# Patient Record
Sex: FEMALE | Race: BLACK OR AFRICAN AMERICAN | Employment: OTHER | URBAN - METROPOLITAN AREA
[De-identification: names, ages, dates, MRNs, and addresses within clinical notes are randomized per-mention and may not be internally consistent; named-entity substitution may affect disease eponyms.]

---

## 2017-01-31 ENCOUNTER — OFFICE VISIT (OUTPATIENT)
Dept: INTERNAL MEDICINE CLINIC | Age: 62
End: 2017-01-31

## 2017-01-31 VITALS
RESPIRATION RATE: 18 BRPM | WEIGHT: 227 LBS | HEIGHT: 60 IN | HEART RATE: 77 BPM | TEMPERATURE: 97.9 F | DIASTOLIC BLOOD PRESSURE: 90 MMHG | SYSTOLIC BLOOD PRESSURE: 140 MMHG | OXYGEN SATURATION: 92 % | BODY MASS INDEX: 44.57 KG/M2

## 2017-01-31 DIAGNOSIS — E66.01 MORBID OBESITY, UNSPECIFIED OBESITY TYPE (HCC): ICD-10-CM

## 2017-01-31 DIAGNOSIS — R73.01 IFG (IMPAIRED FASTING GLUCOSE): ICD-10-CM

## 2017-01-31 DIAGNOSIS — I10 ESSENTIAL HYPERTENSION: ICD-10-CM

## 2017-01-31 DIAGNOSIS — Z11.3 ROUTINE SCREENING FOR STI (SEXUALLY TRANSMITTED INFECTION): ICD-10-CM

## 2017-01-31 DIAGNOSIS — Z00.00 PHYSICAL EXAM, ANNUAL: Primary | ICD-10-CM

## 2017-01-31 DIAGNOSIS — R60.0 EDEMA OF BOTH LEGS: ICD-10-CM

## 2017-01-31 DIAGNOSIS — E78.00 PURE HYPERCHOLESTEROLEMIA: ICD-10-CM

## 2017-01-31 DIAGNOSIS — E55.9 VITAMIN D DEFICIENCY: ICD-10-CM

## 2017-01-31 RX ORDER — FUROSEMIDE 40 MG/1
40 TABLET ORAL
Qty: 30 TAB | Refills: 3 | Status: SHIPPED | OUTPATIENT
Start: 2017-01-31 | End: 2018-03-26 | Stop reason: SDUPTHER

## 2017-01-31 RX ORDER — VALSARTAN AND HYDROCHLOROTHIAZIDE 160; 25 MG/1; MG/1
1 TABLET ORAL DAILY
Qty: 90 TAB | Refills: 3 | Status: SHIPPED | OUTPATIENT
Start: 2017-01-31 | End: 2018-03-26 | Stop reason: SDUPTHER

## 2017-01-31 NOTE — PATIENT INSTRUCTIONS
Learning About Living Laila  What is a living will? A living will is a legal form you use to write down the kind of care you want at the end of your life. It is used by the health professionals who will treat you if you aren't able to decide for yourself. If you put your wishes in writing, your loved ones and others will know what kind of care you want. They won't need to guess. This can ease your mind and be helpful to others. A living will is not the same as an estate or property will. An estate will explains what you want to happen with your money and property after you die. Is a living will a legal document? A living will is a legal document. Each state has its own laws about living minor. If you move to another state, make sure that your living will is legal in the state where you now live. Or you might use a universal form that has been approved by many states. This kind of form can sometimes be completed and stored online. Your electronic copy will then be available wherever you have a connection to the Internet. In most cases, doctors will respect your wishes even if you have a form from a different state. · You don't need an  to complete a living will. But legal advice can be helpful if your state's laws are unclear, your health history is complicated, or your family can't agree on what should be in your living will. · You can change your living will at any time. Some people find that their wishes about end-of-life care change as their health changes. · In addition to making a living will, think about completing a medical power of  form. This form lets you name the person you want to make end-of-life treatment decisions for you (your \"health care agent\") if you're not able to. Many hospitals and nursing homes will give you the forms you need to complete a living will and a medical power of .   · Your living will is used only if you can't make or communicate decisions for yourself anymore. If you become able to make decisions again, you can accept or refuse any treatment, no matter what you wrote in your living will. · Your state may offer an online registry. This is a place where you can store your living will online so the doctors and nurses who need to treat you can find it right away. What should you think about when creating a living will? Talk about your end-of-life wishes with your family members and your doctor. Let them know what you want. That way the people making decisions for you won't be surprised by your choices. Think about these questions as you make your living will:  · Do you know enough about life support methods that might be used? If not, talk to your doctor so you know what might be done if you can't breathe on your own, your heart stops, or you're unable to swallow. · What things would you still want to be able to do after you receive life-support methods? Would you want to be able to walk? To speak? To eat on your own? To live without the help of machines? · If you have a choice, where do you want to be cared for? In your home? At a hospital or nursing home? · Do you want certain Bahai practices performed if you become very ill? · If you have a choice at the end of your life, where would you prefer to die? At home? In a hospital or nursing home? Somewhere else? · Would you prefer to be buried or cremated? · Do you want your organs to be donated after you die? What should you do with your living will? · Make sure that your family members and your health care agent have copies of your living will. · Give your doctor a copy of your living will to keep in your medical record. If you have more than one doctor, make sure that each one has a copy. · You may want to put a copy of your living will where it can be easily found. Where can you learn more? Go to http://leyla-pascale.info/.   Enter P299 in the search box to learn more about \"Learning About Living Perroy. \"  Current as of: February 24, 2016  Content Version: 11.1  © 3459-5080 [employer]. Care instructions adapted under license by [employer] (which disclaims liability or warranty for this information). If you have questions about a medical condition or this instruction, always ask your healthcare professional. [address] any warranty or liability for your use of this information.

## 2017-01-31 NOTE — MR AVS SNAPSHOT
Visit Information Date & Time Provider Department Dept. Phone Encounter #  
 1/31/2017 10:30 AM Deb Larkin NP [facility] Pediatric[facility] and Internal Medicine [phone] [id number] Follow-up Instructions Return in about 3 months (around 4/30/2017) for htn. Upcoming Health Maintenance Date Due DTaP/Tdap/Td series (1 - Tdap) 7/13/1975 FOBT Q 1 YEAR AGE 50-75 6/5/2016 INFLUENZA AGE 9 TO ADULT 8/1/2016 PAP AKA CERVICAL CYTOLOGY 4/1/2017 BREAST CANCER SCRN MAMMOGRAM 9/13/2018 Allergies as of 1/31/2017  Review Complete On: 1/31/2017 By: Deb Larkin NP No Known Allergies Current Immunizations  Reviewed on 6/5/2015 Name Date  
 TB Skin Test (PPD) Intradermal 4/9/2013 Zoster Vaccine, Live 6/5/2015  9:08 AM  
  
 Not reviewed this visit You Were Diagnosed With   
  
 Codes Comments Essential hypertension    -  Primary ICD-10-CM: I10 
ICD-9-CM: 401.9 Edema of both legs     ICD-10-CM: R60.0 ICD-9-CM: 782.3 IFG (impaired fasting glucose)     ICD-10-CM: R73.01 
ICD-9-CM: 790.21 Pure hypercholesterolemia     ICD-10-CM: E78.00 ICD-9-CM: 272.0 Vitamin D deficiency     ICD-10-CM: E55.9 ICD-9-CM: 268.9 Routine screening for STI (sexually transmitted infection)     ICD-10-CM: Z11.3 ICD-9-CM: V74.5 Vitals BP Pulse Temp Resp Height(growth percentile) Weight(growth percentile) 140/90 77 97.9 °F (36.6 °C) (Oral) 18 5' (1.524 m) 227 lb (103 kg) SpO2 BMI OB Status Smoking Status 92% 44.33 kg/m2 Hysterectomy Never Smoker Vitals History BMI and BSA Data Body Mass Index Body Surface Area 44.33 kg/m 2 2.09 m 2 Preferred Pharmacy Pharmacy Name [address] [facility] [phone] Your Updated Medication List  
  
   
This list is accurate as of: 1/31/17 11:30 AM.  Always use your most recent med list.  
  
  
  
  
 ALEVE 220 mg tablet Generic drug:  naproxen sodium Take 440 mg by mouth two (2) times daily (with meals). Indications: PAIN  
  
 aspirin delayed-release 81 mg tablet Take  by mouth daily. Comp. Stocking,Knee,Regular,Lrg Misc  
20-30mmHG  
  
 furosemide 40 mg tablet Commonly known as:  LASIX Take 1 Tab by mouth daily as needed. multivitamin tablet Commonly known as:  ONE A DAY Take 1 tablet by mouth daily. potassium chloride 10 mEq tablet Commonly known as:  K-DUR, KLOR-CON Take 1 Tab by mouth two (2) times a day. traMADol 50 mg tablet Commonly known as:  ULTRAM  
Take 1 Tab by mouth every eight (8) hours as needed for Pain. Max Daily Amount: 150 mg.  
  
 valsartan-hydroCHLOROthiazide 160-25 mg per tablet Commonly known as:  DIOVAN-HCT Take 1 Tab by mouth daily. Prescriptions Sent to Pharmacy Refills  
 valsartan-hydroCHLOROthiazide (DIOVAN-HCT) 160-25 mg per tablet 3 Sig: Take 1 Tab by mouth daily. Class: Normal  
 Pharmacy: [address], [address] Ph #: [phone] Route: Oral  
 furosemide (LASIX) 40 mg tablet 3 Sig: Take 1 Tab by mouth daily as needed. Class: Normal  
 Pharmacy: [address], [address] Ph #: [phone] Route: Oral  
  
We Performed the Following HEMOGLOBIN A1C WITH EAG [83439 CPT(R)] HSV-2 AB, IGG GLYCOPROTEIN, G-SPECIFIC [id number] CPT(R)] LIPID PANEL [77353 CPT(R)] METABOLIC PANEL, COMPREHENSIVE [17412 CPT(R)] VITAMIN D, 25 HYDROXY [id number] CPT(R)] Follow-up Instructions Return in about 3 months (around 4/30/2017) for htn. Patient Instructions Ousmane Ramos 1721 What is a living will? A living will is a legal form you use to write down the kind of care you want at the end of your life.  It is used by the health professionals who will treat you if you aren't able to decide for yourself. If you put your wishes in writing, your loved ones and others will know what kind of care you want. They won't need to guess. This can ease your mind and be helpful to others. A living will is not the same as an estate or property will. An estate will explains what you want to happen with your money and property after you die. Is a living will a legal document? A living will is a legal document. Each state has its own laws about living minor. If you move to another state, make sure that your living will is legal in the state where you now live. Or you might use a universal form that has been approved by many states. This kind of form can sometimes be completed and stored online. Your electronic copy will then be available wherever you have a connection to the Internet. In most cases, doctors will respect your wishes even if you have a form from a different state. · You don't need an  to complete a living will. But legal advice can be helpful if your state's laws are unclear, your health history is complicated, or your family can't agree on what should be in your living will. · You can change your living will at any time. Some people find that their wishes about end-of-life care change as their health changes. · In addition to making a living will, think about completing a medical power of  form. This form lets you name the person you want to make end-of-life treatment decisions for you (your \"health care agent\") if you're not able to. Many hospitals and nursing homes will give you the forms you need to complete a living will and a medical power of . · Your living will is used only if you can't make or communicate decisions for yourself anymore. If you become able to make decisions again, you can accept or refuse any treatment, no matter what you wrote in your living will. · Your state may offer an online registry. This is a place where you can store your living will online so the doctors and nurses who need to treat you can find it right away. What should you think about when creating a living will? Talk about your end-of-life wishes with your family members and your doctor. Let them know what you want. That way the people making decisions for you won't be surprised by your choices. Think about these questions as you make your living will: · Do you know enough about life support methods that might be used? If not, talk to your doctor so you know what might be done if you can't breathe on your own, your heart stops, or you're unable to swallow. · What things would you still want to be able to do after you receive life-support methods? Would you want to be able to walk? To speak? To eat on your own? To live without the help of machines? · If you have a choice, where do you want to be cared for? In your home? At a hospital or nursing home? · Do you want certain Jew practices performed if you become very ill? · If you have a choice at the end of your life, where would you prefer to die? At home? In a hospital or nursing home? Somewhere else? · Would you prefer to be buried or cremated? · Do you want your organs to be donated after you die? What should you do with your living will? · Make sure that your family members and your health care agent have copies of your living will. · Give your doctor a copy of your living will to keep in your medical record. If you have more than one doctor, make sure that each one has a copy. · You may want to put a copy of your living will where it can be easily found. Where can you learn more? Go to http://leyla-pascale.info/. Enter W188 in the search box to learn more about \"Learning About Living Jerzy Kraft. \" Current as of: February 24, 2016 Content Version: 11.1 © 7135-4935 [employer]. Care instructions adapted under license by [employer] (which disclaims liability or warranty for this information). If you have questions about a medical condition or this instruction, always ask your healthcare professional. [address] any warranty or liability for your use of this information. Introducing [facility]! Kenroy Markie introduces Tears for Life patient portal. Now you can access parts of your medical record, email your doctor's office, and request medication refills online. 1. In your internet browser, go to [email or web address] 2. Click on the First Time User? Click Here link in the Sign In box. You will see the New Member Sign Up page. 3. Enter your Tears for Life Access Code exactly as it appears below. You will not need to use this code after youve completed the sign-up process. If you do not sign up before the expiration date, you must request a new code. · Tears for Life Access Code: [email or web address] Expires: 5/1/2017 10:38 AM 
 
4. Enter the last four digits of your Social Security Number (xxxx) and Date of Birth (mm/dd/yyyy) as indicated and click Submit. You will be taken to the next sign-up page. 5. Create a Tears for Life ID. This will be your Tears for Life login ID and cannot be changed, so think of one that is secure and easy to remember. 6. Create a Tears for Life password. You can change your password at any time. 7. Enter your Password Reset Question and Answer. This can be used at a later time if you forget your password. 8. Enter your e-mail address. You will receive e-mail notification when new information is available in [address] 9. Click Sign Up. You can now view and download portions of your medical record. 10. Click the Download Summary menu link to download a portable copy of your medical information.  
 
If you have questions, please visit the Frequently Asked Questions section of the [email or web address]. Remember, MIOTtechhart is NOT to be used for urgent needs. For medical emergencies, dial 911. Now available from your iPhone and Android! Please provide this summary of care documentation to your next provider. Your primary care clinician is listed as Liberty Carver. If you have any questions after today's visit, please call [phone].

## 2017-01-31 NOTE — PROGRESS NOTES
RM#8  Chief Complaint   Patient presents with    Complete Physical     weight loss, knee pain bilateral     1. Have you been to the ER, urgent care clinic since your last visit? Hospitalized since your last visit? Yes    2. Have you seen or consulted any other health care providers outside of the [address] [place] [address] since your last visit? Include any pap smears or colon screening.  [facility] 1/29/17 vertigo and 1/22/17 bronchitis  No living will ADV

## 2017-01-31 NOTE — PROGRESS NOTES
HISTORY OF PRESENT ILLNESS  Samuel Bain is a 57 y.o. female. HPI  See in UC twice this month, first for bronchitis and last Saturday for vertigo. Meclizine and Zofran effective. Feels better today    Was on Contrave for weight loss,  stated by  PT also received gel shots and cortisone injections in right knee     Dr. Adria Mccloud, ortho. Recommend weight loss before knee surgery    She plans to see nutritionist    Compliant with medications. Denies ADRs    Sees gynecologist    New partner. Requests STI screening    Past Medical History   Diagnosis Date    HTN (hypertension) 1/15/2010    Sleep apnea 1/20/2015    Sleep apnea, obstructive 1/5/2016       Current Outpatient Prescriptions on File Prior to Visit   Medication Sig Dispense Refill    multivitamin (ONE A DAY) tablet Take 1 tablet by mouth daily.  traMADol (ULTRAM) 50 mg tablet Take 1 Tab by mouth every eight (8) hours as needed for Pain. Max Daily Amount: 150 mg. 30 Tab 0    Comp. Stocking,Knee,Regular,Lrg misc 20-30mmHG 1 Units 2    aspirin delayed-release 81 mg tablet Take  by mouth daily.  naproxen sodium (ALEVE) 220 mg tablet Take 440 mg by mouth two (2) times daily (with meals). Indications: PAIN      potassium chloride (K-DUR, KLOR-CON) 10 mEq tablet Take 1 Tab by mouth two (2) times a day. 60 Tab 2     No current facility-administered medications on file prior to visit. Review of Systems   Constitutional: Negative for malaise/fatigue. Eyes: Negative. Respiratory: Negative. Cardiovascular: Negative. Gastrointestinal: Negative. Genitourinary: Negative. Musculoskeletal: Positive for joint pain (bilateral knee). Neurological: Negative. Psychiatric/Behavioral: Negative. Physical Exam   Constitutional: She is oriented to person, place, and time. She appears well-developed and well-nourished. No distress. Cardiovascular: Normal rate, regular rhythm and intact distal pulses.     Pulmonary/Chest: Effort normal and breath sounds normal.   Musculoskeletal: She exhibits no edema or tenderness. Antalgic gait   Lymphadenopathy:     She has no cervical adenopathy. Neurological: She is alert and oriented to person, place, and time. Skin: Skin is warm and dry. She is not diaphoretic. Psychiatric: She has a normal mood and affect. Her behavior is normal. Judgment and thought content normal.       ASSESSMENT and PLAN    ICD-10-CM ICD-9-CM    1. Physical exam, annual Z00.00 V70.0    2. Essential hypertension I10 401.9 valsartan-hydroCHLOROthiazide (DIOVAN-HCT) 160-25 mg per tablet      METABOLIC PANEL, COMPREHENSIVE   3. Edema of both legs R60.0 782.3 furosemide (LASIX) 40 mg tablet   4. IFG (impaired fasting glucose) R73.01 790.21 HEMOGLOBIN A1C WITH EAG      METABOLIC PANEL, COMPREHENSIVE   5. Pure hypercholesterolemia E78.00 272.0 LIPID PANEL   6. Vitamin D deficiency E55.9 268.9 VITAMIN D, 25 HYDROXY   7. Routine screening for STI (sexually transmitted infection) Z11.3 V74.5 HSV-2 AB, IGG GLYCOPROTEIN, G-SPECIFIC   8.  Morbid obesity, unspecified obesity type ([facility] E66.01 278.01      Follow-up Disposition:  Return in about 3 months (around 4/30/2017) for htn.  reviewed diet, exercise and weight control  reviewed medications and side effects in detail  use of aspirin to prevent MI and TIA's discussed

## 2017-02-01 LAB
25(OH)D3+25(OH)D2 SERPL-MCNC: 23.1 NG/ML (ref 30–100)
ALBUMIN SERPL-MCNC: 3.9 G/DL (ref 3.6–4.8)
ALBUMIN/GLOB SERPL: 1.5 {RATIO} (ref 1.1–2.5)
ALP SERPL-CCNC: 76 IU/L (ref 39–117)
ALT SERPL-CCNC: 15 IU/L (ref 0–32)
AST SERPL-CCNC: 20 IU/L (ref 0–40)
BILIRUB SERPL-MCNC: 0.5 MG/DL (ref 0–1.2)
BUN SERPL-MCNC: 14 MG/DL (ref 8–27)
BUN/CREAT SERPL: 14 (ref 11–26)
CALCIUM SERPL-MCNC: 9.3 MG/DL (ref 8.7–10.3)
CHLORIDE SERPL-SCNC: 96 MMOL/L (ref 96–106)
CHOLEST SERPL-MCNC: 220 MG/DL (ref 100–199)
CO2 SERPL-SCNC: 29 MMOL/L (ref 18–29)
CREAT SERPL-MCNC: 1.03 MG/DL (ref 0.57–1)
EST. AVERAGE GLUCOSE BLD GHB EST-MCNC: 123 MG/DL
GLOBULIN SER CALC-MCNC: 2.6 G/DL (ref 1.5–4.5)
GLUCOSE SERPL-MCNC: 94 MG/DL (ref 65–99)
HBA1C MFR BLD: 5.9 % (ref 4.8–5.6)
HDLC SERPL-MCNC: 59 MG/DL
HSV2 IGG SER IA-ACNC: <0.91 INDEX (ref 0–0.9)
LDLC SERPL CALC-MCNC: 133 MG/DL (ref 0–99)
POTASSIUM SERPL-SCNC: 4 MMOL/L (ref 3.5–5.2)
PROT SERPL-MCNC: 6.5 G/DL (ref 6–8.5)
SODIUM SERPL-SCNC: 141 MMOL/L (ref 134–144)
TRIGL SERPL-MCNC: 142 MG/DL (ref 0–149)
VLDLC SERPL CALC-MCNC: 28 MG/DL (ref 5–40)

## 2017-02-14 ENCOUNTER — OFFICE VISIT (OUTPATIENT)
Dept: INTERNAL MEDICINE CLINIC | Age: 62
End: 2017-02-14

## 2017-02-14 VITALS
DIASTOLIC BLOOD PRESSURE: 76 MMHG | OXYGEN SATURATION: 95 % | HEIGHT: 60 IN | WEIGHT: 228 LBS | RESPIRATION RATE: 18 BRPM | HEART RATE: 80 BPM | BODY MASS INDEX: 44.76 KG/M2 | TEMPERATURE: 97.5 F | SYSTOLIC BLOOD PRESSURE: 141 MMHG

## 2017-02-14 DIAGNOSIS — H93.12 TINNITUS AURIUM, LEFT: Primary | ICD-10-CM

## 2017-02-14 RX ORDER — MECLIZINE HYDROCHLORIDE 25 MG/1
25 TABLET ORAL
Qty: 30 TAB | Refills: 0 | Status: SHIPPED | OUTPATIENT
Start: 2017-02-14 | End: 2017-02-24

## 2017-02-14 RX ORDER — CEPHALEXIN 500 MG/1
500 CAPSULE ORAL 3 TIMES DAILY
Qty: 30 CAP | Refills: 0 | Status: SHIPPED | OUTPATIENT
Start: 2017-02-14 | End: 2017-02-24

## 2017-02-14 NOTE — MR AVS SNAPSHOT
Visit Information Date & Time Provider Department Dept. Phone Encounter #  
 2/14/2017  1:45 PM Rebecca Gibson [phone] [facility] [phone] [id number] Follow-up Instructions Return if symptoms worsen or fail to improve. Upcoming Health Maintenance Date Due DTaP/Tdap/Td series (1 - Tdap) 7/13/1975 FOBT Q 1 YEAR AGE 50-75 6/5/2016 INFLUENZA AGE 9 TO ADULT 8/1/2016 PAP AKA CERVICAL CYTOLOGY 4/1/2017 BREAST CANCER SCRN MAMMOGRAM 9/13/2018 Allergies as of 2/14/2017  Review Complete On: 2/14/2017 By: Rae Aguilar No Known Allergies Current Immunizations  Reviewed on 6/5/2015 Name Date  
 TB Skin Test (PPD) Intradermal 4/9/2013 Zoster Vaccine, Live 6/5/2015  9:08 AM  
  
 Not reviewed this visit You Were Diagnosed With   
  
 Codes Comments Tinnitus aurium, left    -  Primary ICD-10-CM: H93.12 
ICD-9-CM: 388.31 Vitals BP Pulse Temp Resp Height(growth percentile) Weight(growth percentile) 141/76 (BP 1 Location: Left arm, BP Patient Position: Sitting) 80 97.5 °F (36.4 °C) (Oral) 18 5' (1.524 m) 228 lb (103.4 kg) SpO2 BMI OB Status Smoking Status 95% 44.53 kg/m2 Hysterectomy Never Smoker BMI and BSA Data Body Mass Index Body Surface Area 44.53 kg/m 2 2.09 m 2 Preferred Pharmacy Pharmacy Name Phone [phone] [phone] Your Updated Medication List  
  
   
This list is accurate as of: 2/14/17  2:33 PM.  Always use your most recent med list.  
  
  
  
  
 ALEVE 220 mg tablet Generic drug:  naproxen sodium Take 440 mg by mouth two (2) times daily (with meals). Indications: PAIN  
  
 aspirin delayed-release 81 mg tablet Take  by mouth daily. cephALEXin 500 mg capsule Commonly known as:  Merdis Perone Take 1 Cap by mouth three (3) times daily for 10 days. Comp.Stocking,Knee,Regular,Lrg Misc  
20-30mmHG  
  
 furosemide 40 mg tablet Commonly known as:  LASIX Take 1 Tab by mouth daily as needed. meclizine 25 mg tablet Commonly known as:  ANTIVERT Take 1 Tab by mouth three (3) times daily as needed for up to 10 days. multivitamin tablet Commonly known as:  ONE A DAY Take 1 tablet by mouth daily. potassium chloride 10 mEq tablet Commonly known as:  K-DUR, KLOR-CON Take 1 Tab by mouth two (2) times a day. traMADol 50 mg tablet Commonly known as:  ULTRAM  
Take 1 Tab by mouth every eight (8) hours as needed for Pain. Max Daily Amount: 150 mg.  
  
 valsartan-hydroCHLOROthiazide 160-25 mg per tablet Commonly known as:  DIOVAN-HCT Take 1 Tab by mouth daily. Prescriptions Sent to Pharmacy Refills  
 cephALEXin (KEFLEX) 500 mg capsule 0 Sig: Take 1 Cap by mouth three (3) times daily for 10 days. Class: Normal  
 Pharmacy: [address] Ph #: [phone] Route: Oral  
 meclizine (ANTIVERT) 25 mg tablet 0 Sig: Take 1 Tab by mouth three (3) times daily as needed for up to 10 days. Class: Normal  
 Pharmacy: [address] Ph #: [phone] Route: Oral  
  
We Performed the Following REFERRAL TO ENT-OTOLARYNGOLOGY [UIH69 Custom] Comments:  
 Please evaluate patient for tinnitus, unstable gait. Follow-up Instructions Return if symptoms worsen or fail to improve. Referral Information Referral ID Referred By Referred To  
  
 [id number] DIONI, [address] [facility], [address] [place] [address] Visits Status Start Date End Date 1 New Request 2/14/17 2/14/18  If your referral has a status of pending review or denied, additional information will be sent to support the outcome of this decision. Patient Instructions Ousmane Ramos 1726 What is a living will? A living will is a legal form you use to write down the kind of care you want at the end of your life. It is used by the health professionals who will treat you if you aren't able to decide for yourself. If you put your wishes in writing, your loved ones and others will know what kind of care you want. They won't need to guess. This can ease your mind and be helpful to others. A living will is not the same as an estate or property will. An estate will explains what you want to happen with your money and property after you die. Is a living will a legal document? A living will is a legal document. Each state has its own laws about living minor. If you move to another state, make sure that your living will is legal in the state where you now live. Or you might use a universal form that has been approved by many states. This kind of form can sometimes be completed and stored online. Your electronic copy will then be available wherever you have a connection to the Internet. In most cases, doctors will respect your wishes even if you have a form from a different state. · You don't need an  to complete a living will. But legal advice can be helpful if your state's laws are unclear, your health history is complicated, or your family can't agree on what should be in your living will. · You can change your living will at any time. Some people find that their wishes about end-of-life care change as their health changes. · In addition to making a living will, think about completing a medical power of  form. This form lets you name the person you want to make end-of-life treatment decisions for you (your \"health care agent\") if you're not able to.  Many hospitals and nursing homes will give you the forms you need to complete a living will and a medical power of . · Your living will is used only if you can't make or communicate decisions for yourself anymore. If you become able to make decisions again, you can accept or refuse any treatment, no matter what you wrote in your living will. · Your state may offer an online registry. This is a place where you can store your living will online so the doctors and nurses who need to treat you can find it right away. What should you think about when creating a living will? Talk about your end-of-life wishes with your family members and your doctor. Let them know what you want. That way the people making decisions for you won't be surprised by your choices. Think about these questions as you make your living will: · Do you know enough about life support methods that might be used? If not, talk to your doctor so you know what might be done if you can't breathe on your own, your heart stops, or you're unable to swallow. · What things would you still want to be able to do after you receive life-support methods? Would you want to be able to walk? To speak? To eat on your own? To live without the help of machines? · If you have a choice, where do you want to be cared for? In your home? At a hospital or nursing home? · Do you want certain Confucianism practices performed if you become very ill? · If you have a choice at the end of your life, where would you prefer to die? At home? In a hospital or nursing home? Somewhere else? · Would you prefer to be buried or cremated? · Do you want your organs to be donated after you die? What should you do with your living will? · Make sure that your family members and your health care agent have copies of your living will. · Give your doctor a copy of your living will to keep in your medical record. If you have more than one doctor, make sure that each one has a copy. · You may want to put a copy of your living will where it can be easily found. Where can you learn more? Go to http://leyla-pascale.info/. Enter T049 in the search box to learn more about \"Learning About Living Laila. \" Current as of: February 24, 2016 Content Version: 11.1 © 4324-3577 [employer]. Care instructions adapted under license by [employer] (which disclaims liability or warranty for this information). If you have questions about a medical condition or this instruction, always ask your healthcare professional. [place] [address] any warranty or liability for your use of this information. Tinnitus: Care Instructions Your Care Instructions Many people have some ringing sounds in their ears once in a while. You may hear a roar, a hiss, a tinkle, or a buzz. The sound usually lasts only a few minutes. If it goes on all the time, you may have tinnitus. Tinnitus is usually caused by long-term exposure to loud noise. This damages the nerves in the inner ear. It can occur with all types of hearing loss. It may be a symptom of almost any ear problem. Tinnitus may be caused by a buildup of earwax. Or it may be caused by ear infections or certain medicines (especially antibiotics or large amounts of aspirin). You can also hear noises in your ears because of an injury to the ears, drinking too much alcohol or caffeine, or a medical condition. You may need tests to evaluate your hearing and to find causes of long-lasting tinnitus. Your doctor may suggest one or more treatments to help you cope with it. You can also do things at home to help reduce symptoms. Follow-up care is a key part of your treatment and safety. Be sure to make and go to all appointments, and call your doctor if you are having problems. It's also a good idea to know your test results and keep a list of the medicines you take. How can you care for yourself at home? · Limit or cut out alcohol and caffeine. They can make your symptoms worse. · Do not smoke or use other tobacco products. Nicotine reduces blood flow to the ear and makes tinnitus worse. If you need help quitting, talk to your doctor about stop-smoking programs and medicines. These can increase your chances of quitting for good. · Talk to your doctor about whether to stop taking aspirin and similar products such as ibuprofen or naproxen. · Get exercise often. It can improve blood flow to the ear. Ways to cope with noise Some tinnitus may last a long time. To cope with noise, try to: · Avoid noises that you think caused your tinnitus. If you can't avoid loud noises, wear earplugs or earmuffs. · Ignore the sound by paying attention to other things. · Relax using biofeedback, meditation, or yoga. Feeling stressed and being tired can make tinnitus worse. · Play music or white noise to help you sleep. Background noise may cover up the noise that you hear in your ears. You can buy a machine that makes soothing sounds, such as ocean waves. When should you call for help? Call 911 anytime you think you may need emergency care. For example, call if: 
· You have symptoms of a stroke. These may include: 
¨ Sudden numbness, tingling, weakness, or loss of movement in your face, arm, or leg, especially on only one side of your body. ¨ Sudden vision changes. ¨ Sudden trouble speaking. ¨ Sudden confusion or trouble understanding simple statements. ¨ Sudden problems with walking or balance. ¨ A sudden, severe headache that is different from past headaches. Call your doctor now or seek immediate medical care if: 
· You develop other symptoms. These may include hearing loss (or worse hearing loss), balance problems, dizziness, nausea, or vomiting. Watch closely for changes in your health, and be sure to contact your doctor if: 
· Your tinnitus moves from both ears to one ear. · Your hearing loss gets worse within 1 day after an ear injury. · Your tinnitus or hearing loss does not get better within 1 week after an ear injury. · Your tinnitus bothers you enough that you want to take medicines to help you cope with it. Where can you learn more? Go to http://leyla-pascale.info/. Enter S165 in the search box to learn more about \"Tinnitus: Care Instructions. \" Current as of: July 29, 2016 Content Version: 11.1 © 4579-8059 [employer]. Care instructions adapted under license by LivelyFeed (which disclaims liability or warranty for this information). If you have questions about a medical condition or this instruction, always ask your healthcare professional. [address] any warranty or liability for your use of this information. Introducing [facility]! Dear Joss Cabrera: Thank you for requesting a Coupay account. Our records indicate that you already have an active Coupay account. You can access your account anytime at [email or web address] Did you know that you can access your hospital and ER discharge instructions at any time in Coupay? You can also review all of your test results from your hospital stay or ER visit. Additional Information If you have questions, please visit the Frequently Asked Questions section of the Coupay website at [email or web address] Remember, Coupay is NOT to be used for urgent needs. For medical emergencies, dial 911. Now available from your iPhone and Android! Please provide this summary of care documentation to your next provider. Your primary care clinician is listed as Mirta Johnston. If you have any questions after today's visit, please call [phone].

## 2017-02-14 NOTE — PROGRESS NOTES
RM#9  Chief Complaint   Patient presents with    Dizziness     1. Have you been to the ER, urgent care clinic since your last visit? Hospitalized since your last visit? No    2. Have you seen or consulted any other health care providers outside of the [address] [place] [address] since your last visit? Include any pap smears or colon screening.  No  No living will ADV

## 2017-02-14 NOTE — PATIENT INSTRUCTIONS
Learning About Trey Tapia  What is a living will? A living will is a legal form you use to write down the kind of care you want at the end of your life. It is used by the health professionals who will treat you if you aren't able to decide for yourself. If you put your wishes in writing, your loved ones and others will know what kind of care you want. They won't need to guess. This can ease your mind and be helpful to others. A living will is not the same as an estate or property will. An estate will explains what you want to happen with your money and property after you die. Is a living will a legal document? A living will is a legal document. Each state has its own laws about living minor. If you move to another state, make sure that your living will is legal in the state where you now live. Or you might use a universal form that has been approved by many states. This kind of form can sometimes be completed and stored online. Your electronic copy will then be available wherever you have a connection to the Internet. In most cases, doctors will respect your wishes even if you have a form from a different state. · You don't need an  to complete a living will. But legal advice can be helpful if your state's laws are unclear, your health history is complicated, or your family can't agree on what should be in your living will. · You can change your living will at any time. Some people find that their wishes about end-of-life care change as their health changes. · In addition to making a living will, think about completing a medical power of  form. This form lets you name the person you want to make end-of-life treatment decisions for you (your \"health care agent\") if you're not able to. Many hospitals and nursing homes will give you the forms you need to complete a living will and a medical power of .   · Your living will is used only if you can't make or communicate decisions for yourself anymore. If you become able to make decisions again, you can accept or refuse any treatment, no matter what you wrote in your living will. · Your state may offer an online registry. This is a place where you can store your living will online so the doctors and nurses who need to treat you can find it right away. What should you think about when creating a living will? Talk about your end-of-life wishes with your family members and your doctor. Let them know what you want. That way the people making decisions for you won't be surprised by your choices. Think about these questions as you make your living will:  · Do you know enough about life support methods that might be used? If not, talk to your doctor so you know what might be done if you can't breathe on your own, your heart stops, or you're unable to swallow. · What things would you still want to be able to do after you receive life-support methods? Would you want to be able to walk? To speak? To eat on your own? To live without the help of machines? · If you have a choice, where do you want to be cared for? In your home? At a hospital or nursing home? · Do you want certain Hindu practices performed if you become very ill? · If you have a choice at the end of your life, where would you prefer to die? At home? In a hospital or nursing home? Somewhere else? · Would you prefer to be buried or cremated? · Do you want your organs to be donated after you die? What should you do with your living will? · Make sure that your family members and your health care agent have copies of your living will. · Give your doctor a copy of your living will to keep in your medical record. If you have more than one doctor, make sure that each one has a copy. · You may want to put a copy of your living will where it can be easily found. Where can you learn more? Go to http://leyla-pascale.info/.   Enter X976 in the search box to learn more about \"Learning About Living Iesha Cordova. \"  Current as of: February 24, 2016  Content Version: 11.1  © 2097-0329 [employer]. Care instructions adapted under license by Nexmo (which disclaims liability or warranty for this information). If you have questions about a medical condition or this instruction, always ask your healthcare professional. [facility] [address] any warranty or liability for your use of this information. Tinnitus: Care Instructions  Your Care Instructions  Many people have some ringing sounds in their ears once in a while. You may hear a roar, a hiss, a tinkle, or a buzz. The sound usually lasts only a few minutes. If it goes on all the time, you may have tinnitus. Tinnitus is usually caused by long-term exposure to loud noise. This damages the nerves in the inner ear. It can occur with all types of hearing loss. It may be a symptom of almost any ear problem. Tinnitus may be caused by a buildup of earwax. Or it may be caused by ear infections or certain medicines (especially antibiotics or large amounts of aspirin). You can also hear noises in your ears because of an injury to the ears, drinking too much alcohol or caffeine, or a medical condition. You may need tests to evaluate your hearing and to find causes of long-lasting tinnitus. Your doctor may suggest one or more treatments to help you cope with it. You can also do things at home to help reduce symptoms. Follow-up care is a key part of your treatment and safety. Be sure to make and go to all appointments, and call your doctor if you are having problems. It's also a good idea to know your test results and keep a list of the medicines you take. How can you care for yourself at home? · Limit or cut out alcohol and caffeine. They can make your symptoms worse. · Do not smoke or use other tobacco products. Nicotine reduces blood flow to the ear and makes tinnitus worse.  If you need help quitting, talk to your doctor about stop-smoking programs and medicines. These can increase your chances of quitting for good. · Talk to your doctor about whether to stop taking aspirin and similar products such as ibuprofen or naproxen. · Get exercise often. It can improve blood flow to the ear. Ways to cope with noise  Some tinnitus may last a long time. To cope with noise, try to:  · Avoid noises that you think caused your tinnitus. If you can't avoid loud noises, wear earplugs or earmuffs. · Ignore the sound by paying attention to other things. · Relax using biofeedback, meditation, or yoga. Feeling stressed and being tired can make tinnitus worse. · Play music or white noise to help you sleep. Background noise may cover up the noise that you hear in your ears. You can buy a machine that makes soothing sounds, such as ocean waves. When should you call for help? Call 911 anytime you think you may need emergency care. For example, call if:  · You have symptoms of a stroke. These may include:  ¨ Sudden numbness, tingling, weakness, or loss of movement in your face, arm, or leg, especially on only one side of your body. ¨ Sudden vision changes. ¨ Sudden trouble speaking. ¨ Sudden confusion or trouble understanding simple statements. ¨ Sudden problems with walking or balance. ¨ A sudden, severe headache that is different from past headaches. Call your doctor now or seek immediate medical care if:  · You develop other symptoms. These may include hearing loss (or worse hearing loss), balance problems, dizziness, nausea, or vomiting. Watch closely for changes in your health, and be sure to contact your doctor if:  · Your tinnitus moves from both ears to one ear. · Your hearing loss gets worse within 1 day after an ear injury. · Your tinnitus or hearing loss does not get better within 1 week after an ear injury.   · Your tinnitus bothers you enough that you want to take medicines to help you cope with it.  Where can you learn more? Go to http://leyla-pascale.info/. Enter S165 in the search box to learn more about \"Tinnitus: Care Instructions. \"  Current as of: July 29, 2016  Content Version: 11.1  © 2043-6482 [employer]. Care instructions adapted under license by UCAN (which disclaims liability or warranty for this information). If you have questions about a medical condition or this instruction, always ask your healthcare professional. [place] [address] any warranty or liability for your use of this information.

## 2017-02-24 NOTE — PROGRESS NOTES
HISTORY OF PRESENT ILLNESS  Claudene Ball is a 57 y.o. female presents for evaluation of the following  HPI  She reports recurrence of dizziness and ringing in left ear. She was seen in January  by  provider with similar symptoms; resolved with meclizine. Past Medical History:   Diagnosis Date    HTN (hypertension) 1/15/2010    Sleep apnea 1/20/2015    Sleep apnea, obstructive 1/5/2016       Current Outpatient Prescriptions on File Prior to Visit   Medication Sig Dispense Refill    valsartan-hydroCHLOROthiazide (DIOVAN-HCT) 160-25 mg per tablet Take 1 Tab by mouth daily. 90 Tab 3    furosemide (LASIX) 40 mg tablet Take 1 Tab by mouth daily as needed. 30 Tab 3    aspirin delayed-release 81 mg tablet Take  by mouth daily.  multivitamin (ONE A DAY) tablet Take 1 tablet by mouth daily.  traMADol (ULTRAM) 50 mg tablet Take 1 Tab by mouth every eight (8) hours as needed for Pain. Max Daily Amount: 150 mg. 30 Tab 0    Comp. Stocking,Knee,Regular,Lrg misc 20-30mmHG 1 Units 2    naproxen sodium (ALEVE) 220 mg tablet Take 440 mg by mouth two (2) times daily (with meals). Indications: PAIN      potassium chloride (K-DUR, KLOR-CON) 10 mEq tablet Take 1 Tab by mouth two (2) times a day. 60 Tab 2     No current facility-administered medications on file prior to visit. Review of Systems   Constitutional: Negative for chills, fever and malaise/fatigue. HENT: Positive for congestion, hearing loss and tinnitus. Negative for ear discharge, ear pain and sore throat. Eyes: Negative. Cardiovascular: Negative. Genitourinary: Negative. Musculoskeletal: Negative. Neurological: Positive for dizziness. Negative for sensory change and headaches. Psychiatric/Behavioral: Negative. Physical Exam   Constitutional: She is oriented to person, place, and time. She appears well-developed and well-nourished. No distress.    HENT:   Right Ear: Hearing, external ear and ear canal normal. A middle ear effusion is present. Left Ear: Hearing, external ear and ear canal normal. A middle ear effusion is present. Nose: Nose normal.   Mouth/Throat: Oropharynx is clear and moist. No oropharyngeal exudate. Eyes: Conjunctivae are normal. Right eye exhibits no discharge. Left eye exhibits no discharge. Neck: No thyromegaly present. Cardiovascular: Normal rate, regular rhythm and normal heart sounds. Exam reveals no gallop and no friction rub. No murmur heard. Pulmonary/Chest: Effort normal and breath sounds normal.   Lymphadenopathy:     She has no cervical adenopathy. Neurological: She is alert and oriented to person, place, and time. No cranial nerve deficit. Skin: Skin is warm and dry. She is not diaphoretic. ASSESSMENT and PLAN    ICD-10-CM ICD-9-CM    1. Tinnitus aurium, left H93.12 388. 31 cephALEXin (KEFLEX) 500 mg capsule      meclizine (ANTIVERT) 25 mg tablet      REFERRAL TO ENT-OTOLARYNGOLOGY     Follow-up Disposition:  Return if symptoms worsen or fail to improve.   reviewed medications and side effects in detail

## 2017-09-28 ENCOUNTER — HOSPITAL ENCOUNTER (OUTPATIENT)
Dept: GENERAL RADIOLOGY | Age: 62
Discharge: HOME | End: 2017-09-28
Payer: COMMERCIAL

## 2017-09-28 DIAGNOSIS — M19.90 OSTEOARTHRITIS: ICD-10-CM

## 2017-09-28 PROCEDURE — 73564 X-RAY EXAM KNEE 4 OR MORE: CPT

## 2017-09-28 PROCEDURE — 73562 X-RAY EXAM OF KNEE 3: CPT

## 2018-03-26 ENCOUNTER — OFFICE VISIT (OUTPATIENT)
Dept: INTERNAL MEDICINE CLINIC | Age: 63
End: 2018-03-26

## 2018-03-26 VITALS
WEIGHT: 248 LBS | DIASTOLIC BLOOD PRESSURE: 78 MMHG | TEMPERATURE: 97.8 F | OXYGEN SATURATION: 99 % | RESPIRATION RATE: 18 BRPM | HEART RATE: 72 BPM | BODY MASS INDEX: 48.69 KG/M2 | HEIGHT: 60 IN | SYSTOLIC BLOOD PRESSURE: 174 MMHG

## 2018-03-26 DIAGNOSIS — R60.0 EDEMA OF BOTH LEGS: ICD-10-CM

## 2018-03-26 DIAGNOSIS — R79.89 LOW VITAMIN D LEVEL: ICD-10-CM

## 2018-03-26 DIAGNOSIS — I10 ESSENTIAL HYPERTENSION: Primary | ICD-10-CM

## 2018-03-26 DIAGNOSIS — E66.01 MORBID OBESITY (HCC): ICD-10-CM

## 2018-03-26 DIAGNOSIS — M17.0 PRIMARY OSTEOARTHRITIS OF BOTH KNEES: ICD-10-CM

## 2018-03-26 DIAGNOSIS — R73.03 PRE-DIABETES: ICD-10-CM

## 2018-03-26 RX ORDER — VALSARTAN AND HYDROCHLOROTHIAZIDE 160; 25 MG/1; MG/1
1 TABLET ORAL DAILY
Qty: 90 TAB | Refills: 3 | Status: SHIPPED | OUTPATIENT
Start: 2018-03-26 | End: 2018-05-29 | Stop reason: DRUGHIGH

## 2018-03-26 RX ORDER — ESTRADIOL 10 UG/1
10 TABLET VAGINAL 2 TIMES WEEKLY
COMMUNITY
Start: 2018-03-07

## 2018-03-26 RX ORDER — DICLOFENAC POTASSIUM 50 MG/1
50 TABLET, FILM COATED ORAL 3 TIMES DAILY
COMMUNITY
End: 2018-07-30 | Stop reason: ALTCHOICE

## 2018-03-26 RX ORDER — FUROSEMIDE 40 MG/1
40 TABLET ORAL
Qty: 30 TAB | Refills: 3 | Status: SHIPPED | OUTPATIENT
Start: 2018-03-26 | End: 2019-09-05 | Stop reason: ALTCHOICE

## 2018-03-26 NOTE — PROGRESS NOTES
Ms. Daksha Arrieta is a new patient who is here to establish care. CC:  Establish Care and Weight Management       HPI:    HTN: HTN, takes medication - valsartan hydrochlorothiazide (160-25) at night time. Usually BP is high per patient - constantly in pain     Knee arthritis: severe OA cannot have a knee replacement until looses weight  Has had cortisone shots to her knees in the past.  Taking diclofenac at night      Bilateral feet and ankle swelling: more pronounced on the right: takes lasix 40mg PRN   Has occasional shortness of breath- patient did have TTE in 2014 at onset of symptoms which was normal. At that time patient had sleep study which was consistent with obstructive sleep apnea. SUDEEP: uses CPAP at night     Morbid obesity: has tried contrave. Current weight is 248 lbs  Reports healthy diet   Patient has struggled with her weight in the past 20 years. She has done multiple diets.   She tried contract in the past and did not tolerate medication        Review of systems:  Constitutional: negative for fever, chills, weight loss, night sweats   Eyes : negative for vision changes, eye pain and discharge  Nose and Throat: negative for tinnitus, sore throat   Cardiovascular: negative for chest pain, palpitations and shortness of breath  Respiratory: negative for shortness of breath, cough and wheezing   Gastroinstestinal: negative for abdominal pain, nausea, vomiting, diarrhea, constipation, and blood in the stool  Musculoskeletal: See HPI  Genitourinary: negative for dysuria, nocturia, polyuria and hematuria   Neurologic: Negative for focal weakness, numbness or incoordination  Skin: negative for rash, pruritus  Hematologic: negative for easy bruising      Past Medical History:   Diagnosis Date    HTN (hypertension) 1/15/2010    Sleep apnea 1/20/2015    Sleep apnea, obstructive 1/5/2016        Past Surgical History:   Procedure Laterality Date    HX APPENDECTOMY      HX HYSTERECTOMY  approx 1987 2/2 uterine fibroids       No Known Allergies    Current Outpatient Prescriptions on File Prior to Visit   Medication Sig Dispense Refill    Comp. Stocking,Knee,Regular,Lrg misc 20-30mmHG 1 Units 2    multivitamin (ONE A DAY) tablet Take 1 tablet by mouth daily. No current facility-administered medications on file prior to visit. family history includes Cancer in her father and mother; Hypertension in her brother, father, and sister. Social History     Social History    Marital status: SINGLE     Spouse name: N/A    Number of children: N/A    Years of education: N/A     Occupational History    Not on file. Social History Main Topics    Smoking status: Never Smoker    Smokeless tobacco: Never Used    Alcohol use 2.5 oz/week     5 Glasses of wine per week      Comment: 1 glass per night    Drug use: No    Sexual activity: Yes     Partners: Male     Birth control/ protection: Condom     Other Topics Concern    Not on file     Social History Narrative       Visit Vitals    /78 (BP 1 Location: Right arm, BP Patient Position: Sitting)    Pulse 72    Temp 97.8 °F (36.6 °C) (Oral)    Resp 18    Ht 5' (1.524 m)    Wt 248 lb (112.5 kg)    SpO2 99%    BMI 48.43 kg/m2     General:  Well appearing female no acute distress, obese  HEENT:   PERRL,normal conjunctiva. External ear and canals normal, TMs normal.  Hearing normal to voice. Nose without edema or discharge, normal septum. Lips, teeth, gums normal.  Oropharynx: no erythema, no exudates, no lesions, normal tongue. Neck:  Supple. Thyroid normal size, nontender, without nodules. No carotid bruit. No masses or lymphadenopathy  Respiratory: no respiratory distress,  no wheezing, no rhonchi, no rales. No chest wall tenderness. Cardiovascular:  RRR, normal S1S2, no murmur. Gastrointestinal: normal bowel sounds, soft, nontender, without masses. No hepatosplenomegaly.   Extremities +2 pulses, 1+ pitting edema, normal sensation   Musculoskeletal:  Normal gait. Normal digits and nails. Normal strength and tone, no atrophy, and no abnormal movement. Crepitus in knees bilaterally  Skin:  No rash, no lesions, no ulcers. Skin warm, normal turgor, without induration or nodules. Neuro:  A and OX4, fluent speech, cranial nerves normal 2-12. Sensation normal to light touch. DTR symmetrical  Psych:  Normal affect      Lab Results   Component Value Date/Time    WBC 8.3 07/15/2014 04:47 PM    HGB 13.2 07/15/2014 04:47 PM    HCT 41.1 07/15/2014 04:47 PM    PLATELET 227 90/58/5383 04:47 PM    MCV 85 07/15/2014 04:47 PM     Lab Results   Component Value Date/Time    Sodium 141 01/31/2017 11:33 AM    Potassium 4.0 01/31/2017 11:33 AM    Chloride 96 01/31/2017 11:33 AM    CO2 29 01/31/2017 11:33 AM    Anion gap 11 09/08/2010 09:44 AM    Glucose 94 01/31/2017 11:33 AM    BUN 14 01/31/2017 11:33 AM    Creatinine 1.03 (H) 01/31/2017 11:33 AM    BUN/Creatinine ratio 14 01/31/2017 11:33 AM    GFR est  07/15/2014 04:47 PM    GFR est non-AA 90 07/15/2014 04:47 PM    Calcium 9.3 01/31/2017 11:33 AM     Lab Results   Component Value Date/Time    Cholesterol, total 220 (H) 01/31/2017 11:33 AM    HDL Cholesterol 59 01/31/2017 11:33 AM    LDL, calculated 133 (H) 01/31/2017 11:33 AM    VLDL, calculated 28 01/31/2017 11:33 AM    Triglyceride 142 01/31/2017 11:33 AM    CHOL/HDL Ratio 4.0 09/08/2010 09:44 AM     Lab Results   Component Value Date/Time    TSH 2.110 07/15/2014 04:47 PM     Lab Results   Component Value Date/Time    Hemoglobin A1c 5.9 (H) 01/31/2017 11:33 AM     Lab Results   Component Value Date/Time    VITAMIN D, 25-HYDROXY 23.1 (L) 01/31/2017 11:33 AM                   Assessment and Plan:     1. Essential hypertension  Blood pressure is elevated today, patient is anxious and having pain in knees  - recommend checking blood pressure with goal of less than  130/85 on average. Follow lo sodium diet. Given DASH diet guidelines. Recommend cardiovascular exercise regularly. Work on weight reduction. Call with blood pressure readings.   - LIPID PANEL  - CBC WITH AUTOMATED DIFF  - valsartan-hydroCHLOROthiazide (DIOVAN-HCT) 160-25 mg per tablet; Take 1 Tab by mouth daily. Dispense: 90 Tab; Refill: 3    2. Morbid obesity ([address]  - struggled with weight for years, has comorbidities: Obstructive sleep apnea, prediabetes, hypertension, severe OA of the knees. I do think patient will be a good candidate for bariatric surgery.  -Patient did not tolerate contrave. Trial of Celexa and advised to start with 0.6 mg and increase as tolerated. Discussed side effects including thyroid cancer in rats. Patient has no history of thyroid disease.  - REFERRAL TO nutritionist - Crist[facility]  - liraglutide (SAXENDA) 3 mg/0.5 mL (18 mg/3 mL) pen; 0.1 mL by SubCUTAneous route daily. Indications: WEIGHT LOSS MANAGEMENT FOR OBESE PATIENT (BMI >= 30)  Dispense: 1 Adjustable Dose Pre-filled Pen Syringe; Refill: 1  - TSH AND FREE T4    3. Primary osteoarthritis of both knees  -Debilitating arthritis of both knees. Patient needs to lose weight prior to being considered for knee replacement. Advised to do pool exercises. 4. Pre-diabetes  -Counseled on weight loss and exercise and diet. Referral to nutritionist  - HEMOGLOBIN A1C WITH EAG  - LIPID PANEL    5. Low vitamin D level  - VITAMIN D, 25 HYDROXY    6. Edema of both legs  -Chronic swelling of legs she has had an echo in the past which showed normal heart structure and function. I suspect venous stasis. Patient takes Lasix as needed. - furosemide (LASIX) 40 mg tablet; Take 1 Tab by mouth daily as needed. Dispense: 30 Tab; Refill: 3    Follow-up Disposition:  Return in about 2 months (around 5/26/2018) for Morbid obesity.      Gisselle Arceo MD

## 2018-03-26 NOTE — PROGRESS NOTES
Reviewed record in preparation for visit and have obtained necessary documentation. Identified pt with two pt identifiers(name and ). Chief Complaint   Patient presents with   [facility] [employer] Weight Management       Health Maintenance Due   Topic Date Due    DTaP/Tdap/Td  (1 - Tdap) 1975    Stool testing for trace blood  2016       Ms. Edwards has a reminder for a \"due or due soon\" health maintenance. I have asked that she discuss this further with her primary care provider for follow-up on this health maintenance. Coordination of Care Questionnaire:  :     1) Have you been to an emergency room, urgent care clinic since your last visit? no   Hospitalized since your last visit? no             2) Have you seen or consulted any other health care providers outside of [address] [place] [address] since your last visit? no  (Include any pap smears or colon screenings in this section.)    3) In the event something were to happen to you and you were unable to speak on your behalf, do you have an Advance Directive/ Living Will in place stating your wishes? NO    Do you have an Advance Directive on file? no    4) Are you interested in receiving information on Advance Directives? YES    Patient is accompanied by self I have received verbal consent from Laurel Lowe to discuss any/all medical information while they are present in the room.

## 2018-03-26 NOTE — PATIENT INSTRUCTIONS
Learning About Bariatric Surgery  What is bariatric surgery? Bariatric surgery is surgery to help you lose weight. This type of surgery is only used for people who are very overweight and have not been able to lose weight with diet and exercise. This surgery makes the stomach smaller. Some types of surgery also change the connection between your stomach and intestines. How is bariatric surgery done? Bariatric surgery may be either \"open\" or \"laparoscopic. \" Open surgery is done through a large cut (incision) in the belly. Laparoscopic surgery is done through several small cuts. The doctor puts a lighted tube, or scope, and other surgical tools through small cuts in your belly. The doctor is able to see your organs with the scope. There are different types of bariatric surgery. Gastric sleeve surgery  The surgery is usually done through several small incisions in the belly. The doctor removes more than half of your stomach. This leaves a thin sleeve, or tube, that is about the size of a banana. Because part of your stomach has been removed, this can't be reversed. Rocky-en-Y gastric bypass surgery  Rocky-en-Y (say \"johnny-en-why\") surgery changes the connection between the stomach and the intestines. The doctor separates a section of your stomach from the rest of your stomach. This makes a small pouch. The new pouch will hold the food you eat. The doctor connects the stomach pouch to the middle part of the small intestine. Gastric banding surgery  The surgery is usually done through several small incisions in the belly. The doctor wraps a band around the upper part of the stomach. This creates a small pouch. The small size of the pouch means that you will get full after you eat just a small amount of food. The doctor can inflate or deflate the band to adjust the size. This lets the doctor adjust how quickly food passes from the new pouch into the stomach.  It does not change the connection between the stomach and the intestines. What can you expect after the surgery? You may stay in the hospital for one or more days after the surgery. How long you stay depends on the type of surgery you had. Most people need 2 to 4 weeks before they are ready to get back to their usual routine. For the first 2 to 6 weeks after surgery, you probably will need to follow a liquid or soft diet. Bit by bit, you will be able to eat more solid foods. Your doctor may advise you to work with a dietitian. This way you'll be sure to get enough protein, vitamins, and minerals while you are losing weight. Even with a healthy diet, you may need to take vitamin and mineral supplements. After surgery, you will not be able to eat very much at one time. You will get full quickly. Try not to eat too much at one time or eat foods that are high in fat or sugar. If you do, you may vomit, get stomach pain, or have diarrhea. You probably will lose weight very quickly in the first few months after surgery. As time goes on, your weight loss will slow down. You will have regular doctor visits to check how you are doing. Think of bariatric surgery as a tool to help you lose weight. It isn't an instant fix. You will still need to eat a healthy diet and get regular exercise. This will help you reach your weight goal and avoid regaining the weight you lose. Follow-up care is a key part of your treatment and safety. Be sure to make and go to all appointments, and call your doctor if you are having problems. It's also a good idea to know your test results and keep a list of the medicines you take. Where can you learn more? Go to http://leyla-pascale.info/. Enter G469 in the search box to learn more about \"Learning About Bariatric Surgery. \"  Current as of: October 13, 2016  Content Version: 11.4  © 0798-9704 [employer].  Care instructions adapted under license by [employer] (which disclaims liability or warranty for this information).  If you have questions about a medical condition or this instruction, always ask your healthcare professional. Jj Lazo any warranty or liability for your use of this information.  -----------------------------------------    Start with the lowest dial on the pen ( 3)   saxenda and increase as tolerated every week     Schedule appointment with bariatric surgery     Schedule appointment with nutritionist

## 2018-03-26 NOTE — MR AVS SNAPSHOT
[address] [place] [address] 
[phone] Patient: Linda Rivera MRN:  [id number] Visit Information Date & Time Provider Department Dept. Phone Encounter #  
 3/26/2018  9:45 AM Ronnie [address] [place] [address],[address] [phone] [id number] Follow-up Instructions Return in about 2 months (around 2018) for Morbid obesity. Upcoming Health Maintenance Date Due DTaP/Tdap/Td series (1 - Tdap) 1975 FOBT Q 1 YEAR AGE 50-75 2016 BREAST CANCER SCRN MAMMOGRAM 2018 PAP AKA CERVICAL CYTOLOGY 10/2/2020 Allergies as of 3/26/2018  Review Complete On: 3/26/2018 By: MD Ronnie  
 No Known Allergies Current Immunizations  Reviewed on 2015 Name Date  
 TB Skin Test (PPD) Intradermal 2013 Zoster Vaccine, Live 2015  9:08 AM  
  
 Not reviewed this visit You Were Diagnosed With   
  
 Codes Comments Essential hypertension    -  Primary ICD-10-CM: I10 
ICD-9-CM: 401.9 [facility] ([facility]     ICD-10-CM: E66.01 
ICD-9-CM: 278.01 Primary osteoarthritis of both knees     ICD-10-CM: M17.0 ICD-9-CM: 715.16 Pre-diabetes     ICD-10-CM: R73.03 
ICD-9-CM: 790.29 Low vitamin D level     ICD-10-CM: E55.9 ICD-9-CM: 268.9 Vitals BP Pulse Temp Resp Height(growth percentile) Weight(growth percentile) 174/78 (BP 1 Location: Right arm, BP Patient Position: Sitting) 72 97.8 °F (36.6 °C) (Oral) 18 5' (1.524 m) 248 lb (112.5 kg) SpO2 BMI OB Status Smoking Status 99% 48.43 kg/m2 Hysterectomy Never Smoker Vitals History BMI and BSA Data Body Mass Index Body Surface Area  
 48.43 kg/m 2 2.18 m 2 Preferred Pharmacy Pharmacy Name Phone [address] [facility] [phone] Your Updated Medication List  
  
   
 This list is accurate as of 3/26/18 10:37 AM.  Always use your most recent med list.  
  
  
  
  
 Comp. Stocking,Knee,Regular,Lrg Misc  
20-30mmHG  
  
 diclofenac potassium 50 mg tablet Commonly known as:  CATAFLAM  
Take 50 mg by mouth three (3) times daily. furosemide 40 mg tablet Commonly known as:  LASIX Take 1 Tab by mouth daily as needed. liraglutide 3 mg/0.5 mL (18 mg/3 mL) pen Commonly known as:  SAXENDA  
0.1 mL by SubCUTAneous route daily. Indications: WEIGHT LOSS MANAGEMENT FOR OBESE PATIENT (BMI >= 30)  
  
 multivitamin tablet Commonly known as:  ONE A DAY Take 1 tablet by mouth daily. valsartan-hydroCHLOROthiazide 160-25 mg per tablet Commonly known as:  DIOVAN-HCT Take 1 Tab by mouth daily. YUVAFEM 10 mcg Tab vaginal tablet Generic drug:  estradiol Prescriptions Sent to Pharmacy Refills  
 liraglutide (SAXENDA) 3 mg/0.5 mL (18 mg/3 mL) pen 1 Si.1 mL by SubCUTAneous route daily. Indications: WEIGHT LOSS MANAGEMENT FOR OBESE PATIENT (BMI >= 30) Class: Normal  
 Pharmacy: [address], [address] Ph #: [phone] Route: SubCUTAneous We Performed the Following CBC WITH AUTOMATED DIFF [56213 CPT(R)] HEMOGLOBIN A1C WITH EAG [77488 CPT(R)] LIPID PANEL [96683 CPT(R)] REFERRAL TO BARIATRIC SURGERY [KFA593 Custom] Comments: Morbid obesity with commorbidities REFERRAL TO PHYSICAL THERAPY [YMZ54 Custom] Comments:  
 Referral for nutrition to work on weight loss TSH AND FREE T4 [17659 CPT(R)] VITAMIN D, 25 HYDROXY [id number] CPT(R)] Follow-up Instructions Return in about 2 months (around 2018) for Morbid obesity. Referral Information Referral ID Referred By Referred To  
  
 [id number] PAZ SHEPHERD, [address] [facility],[address] [place] [address], [address] [place] Phone: [phone] Visits Status Start Date End Date 1 New Request 3/26/18 3/26/19 If your referral has a status of pending review or denied, additional information will be sent to support the outcome of this decision. Referral ID Referred By Referred To  
 [id number] PAZ Goldman MD  
   [address] [place] [address] [phone] [address] Phone: [phone] Fax: [phone] Visits Status Start Date End Date 1 New Request 3/26/18 3/26/19 If your referral has a status of pending review or denied, additional information will be sent to support the outcome of this decision. Patient Instructions Learning About Bariatric Surgery What is bariatric surgery? Bariatric surgery is surgery to help you lose weight. This type of surgery is only used for people who are very overweight and have not been able to lose weight with diet and exercise. This surgery makes the stomach smaller. Some types of surgery also change the connection between your stomach and intestines. How is bariatric surgery done? Bariatric surgery may be either \"open\" or \"laparoscopic. \" Open surgery is done through a large cut (incision) in the belly. Laparoscopic surgery is done through several small cuts. The doctor puts a lighted tube, or scope, and other surgical tools through small cuts in your belly. The doctor is able to see your organs with the scope. There are different types of bariatric surgery. Gastric sleeve surgery The surgery is usually done through several small incisions in the belly. The doctor removes more than half of your stomach. This leaves a thin sleeve, or tube, that is about the size of a banana. Because part of your stomach has been removed, this can't be reversed. Rocky-en-Y gastric bypass surgery Rocky-en-Y (say \"johnny-en-why\") surgery changes the connection between the stomach and the intestines.  
The doctor separates a section of your stomach from the rest of your stomach. This makes a small pouch. The new pouch will hold the food you eat. The doctor connects the stomach pouch to the middle part of the small intestine. Gastric banding surgery The surgery is usually done through several small incisions in the belly. The doctor wraps a band around the upper part of the stomach. This creates a small pouch. The small size of the pouch means that you will get full after you eat just a small amount of food. The doctor can inflate or deflate the band to adjust the size. This lets the doctor adjust how quickly food passes from the new pouch into the stomach. It does not change the connection between the stomach and the intestines. What can you expect after the surgery? You may stay in the hospital for one or more days after the surgery. How long you stay depends on the type of surgery you had. Most people need 2 to 4 weeks before they are ready to get back to their usual routine. For the first 2 to 6 weeks after surgery, you probably will need to follow a liquid or soft diet. Bit by bit, you will be able to eat more solid foods. Your doctor may advise you to work with a dietitian. This way you'll be sure to get enough protein, vitamins, and minerals while you are losing weight. Even with a healthy diet, you may need to take vitamin and mineral supplements. After surgery, you will not be able to eat very much at one time. You will get full quickly. Try not to eat too much at one time or eat foods that are high in fat or sugar. If you do, you may vomit, get stomach pain, or have diarrhea. You probably will lose weight very quickly in the first few months after surgery. As time goes on, your weight loss will slow down. You will have regular doctor visits to check how you are doing. Think of bariatric surgery as a tool to help you lose weight. It isn't an instant fix.  You will still need to eat a healthy diet and get regular exercise. This will help you reach your weight goal and avoid regaining the weight you lose. Follow-up care is a key part of your treatment and safety. Be sure to make and go to all appointments, and call your doctor if you are having problems. It's also a good idea to know your test results and keep a list of the medicines you take. Where can you learn more? Go to http://leyla-pascale.info/. Enter G469 in the search box to learn more about \"Learning About Bariatric Surgery. \" Current as of: October 13, 2016 Content Version: 11.4 © 3963-3273 [employer]. Care instructions adapted under license by [employer] (which disclaims liability or warranty for this information). If you have questions about a medical condition or this instruction, always ask your healthcare professional. [address] any warranty or liability for your use of this information. 
----------------------------------------- Start with the lowest dial on the pen ( 3)  
saxenda and increase as tolerated every week Schedule appointment with bariatric surgery Schedule appointment with nutritionist 
 
 
 
  
Introducing [facility]! Dear Tiarra Ritter: Thank you for requesting a NEXAGE account. Our records indicate that you already have an active NEXAGE account. You can access your account anytime at [email or web address] Did you know that you can access your hospital and ER discharge instructions at any time in NEXAGE? You can also review all of your test results from your hospital stay or ER visit. Additional Information If you have questions, please visit the Frequently Asked Questions section of the NEXAGE website at [email or web address] Remember, NEXAGE is NOT to be used for urgent needs. For medical emergencies, dial 911. Now available from your iPhone and Android! Please provide this summary of care documentation to your next provider. Your primary care clinician is listed as RAVEN Mota. If you have any questions after today's visit, please call [phone].

## 2018-03-27 LAB
25(OH)D3+25(OH)D2 SERPL-MCNC: 25 NG/ML (ref 30–100)
BASOPHILS # BLD AUTO: 0 X10E3/UL (ref 0–0.2)
BASOPHILS NFR BLD AUTO: 0 %
CHOLEST SERPL-MCNC: 215 MG/DL (ref 100–199)
EOSINOPHIL # BLD AUTO: 0.1 X10E3/UL (ref 0–0.4)
EOSINOPHIL NFR BLD AUTO: 2 %
ERYTHROCYTE [DISTWIDTH] IN BLOOD BY AUTOMATED COUNT: 14.4 % (ref 12.3–15.4)
EST. AVERAGE GLUCOSE BLD GHB EST-MCNC: 111 MG/DL
HBA1C MFR BLD: 5.5 % (ref 4.8–5.6)
HCT VFR BLD AUTO: 38.9 % (ref 34–46.6)
HDLC SERPL-MCNC: 56 MG/DL
HGB BLD-MCNC: 12.2 G/DL (ref 11.1–15.9)
IMM GRANULOCYTES # BLD: 0 X10E3/UL (ref 0–0.1)
IMM GRANULOCYTES NFR BLD: 0 %
LDLC SERPL CALC-MCNC: 133 MG/DL (ref 0–99)
LYMPHOCYTES # BLD AUTO: 2 X10E3/UL (ref 0.7–3.1)
LYMPHOCYTES NFR BLD AUTO: 34 %
MCH RBC QN AUTO: 26.6 PG (ref 26.6–33)
MCHC RBC AUTO-ENTMCNC: 31.4 G/DL (ref 31.5–35.7)
MCV RBC AUTO: 85 FL (ref 79–97)
MONOCYTES # BLD AUTO: 0.3 X10E3/UL (ref 0.1–0.9)
MONOCYTES NFR BLD AUTO: 5 %
NEUTROPHILS # BLD AUTO: 3.5 X10E3/UL (ref 1.4–7)
NEUTROPHILS NFR BLD AUTO: 59 %
PLATELET # BLD AUTO: 303 X10E3/UL (ref 150–379)
RBC # BLD AUTO: 4.59 X10E6/UL (ref 3.77–5.28)
T4 FREE SERPL-MCNC: 1.25 NG/DL (ref 0.82–1.77)
TRIGL SERPL-MCNC: 129 MG/DL (ref 0–149)
TSH SERPL DL<=0.005 MIU/L-ACNC: 2.2 UIU/ML (ref 0.45–4.5)
VLDLC SERPL CALC-MCNC: 26 MG/DL (ref 5–40)
WBC # BLD AUTO: 5.8 X10E3/UL (ref 3.4–10.8)

## 2018-04-03 NOTE — PROGRESS NOTES
Called, spoke to pt. Two pt identifiers confirmed. Pt informed Pre diabetes is well controlled - glucose is now in normal range   Thyroid function is normal   Cholesterol LDL is 133 which os stable compared to last year   Vitamin D is improved but still low - add 1000 units of vitamin D3 in addition to multivitamin/ over the counter     Pt verbalized understanding of information discussed w/ no further questions at this time.

## 2018-04-03 NOTE — PROGRESS NOTES
Pre diabetes is well controlled - glucose is now in normal range  Thyroid function is normal  Cholesterol LDL is 133 which os stable compared to last year  Vitamin D is improved but still low - add 1000 units of vitamin D3 in addition to multivitamin/ over the counter

## 2018-05-29 ENCOUNTER — OFFICE VISIT (OUTPATIENT)
Dept: INTERNAL MEDICINE CLINIC | Age: 63
End: 2018-05-29

## 2018-05-29 VITALS
SYSTOLIC BLOOD PRESSURE: 165 MMHG | RESPIRATION RATE: 18 BRPM | HEIGHT: 60 IN | DIASTOLIC BLOOD PRESSURE: 86 MMHG | HEART RATE: 72 BPM | TEMPERATURE: 97.8 F | OXYGEN SATURATION: 99 % | WEIGHT: 241.4 LBS | BODY MASS INDEX: 47.39 KG/M2

## 2018-05-29 DIAGNOSIS — E66.01 MORBID OBESITY WITH BMI OF 40.0-44.9, ADULT (HCC): ICD-10-CM

## 2018-05-29 DIAGNOSIS — I10 ESSENTIAL HYPERTENSION: ICD-10-CM

## 2018-05-29 DIAGNOSIS — E66.01 MORBID OBESITY (HCC): ICD-10-CM

## 2018-05-29 DIAGNOSIS — Z12.39 SCREENING FOR MALIGNANT NEOPLASM OF BREAST: Primary | ICD-10-CM

## 2018-05-29 DIAGNOSIS — Z12.11 SCREENING FOR MALIGNANT NEOPLASM OF COLON: ICD-10-CM

## 2018-05-29 RX ORDER — VALSARTAN AND HYDROCHLOROTHIAZIDE 320; 25 MG/1; MG/1
1 TABLET, FILM COATED ORAL DAILY
Qty: 90 TAB | Refills: 1 | Status: SHIPPED | OUTPATIENT
Start: 2018-05-29 | End: 2018-07-30 | Stop reason: ALTCHOICE

## 2018-05-29 NOTE — MR AVS SNAPSHOT
[address] [place] [address] 
[phone] Patient: Madalyn Seo MRN:  [id number] Visit Information Date & Time Provider Department Dept. Phone Encounter #  
 5/29/2018  8:30 AM Celina Elizabeth, [address] [place] [address],[address] [phone] [id number] Follow-up Instructions Return in about 2 months (around 7/29/2018) for HTN. Upcoming Health Maintenance Date Due DTaP/Tdap/Td series (1 - Tdap) 7/13/1975 Cologuard Q 3 Years 7/13/2004 BREAST CANCER SCRN MAMMOGRAM 9/13/2018 Influenza Age 5 to Adult 8/1/2018 PAP AKA CERVICAL CYTOLOGY 10/2/2020 Allergies as of 5/29/2018  Review Complete On: 3/26/2018 By: Celina Elizabeth MD  
 No Known Allergies Current Immunizations  Reviewed on 6/5/2015 Name Date  
 TB Skin Test (PPD) Intradermal 4/9/2013 Zoster Vaccine, Live 6/5/2015  9:08 AM  
  
 Not reviewed this visit You Were Diagnosed With   
  
 Codes Comments Screening for malignant neoplasm of breast    -  Primary ICD-10-CM: Z12.31 
ICD-9-CM: V76.10 Screening for malignant neoplasm of colon     ICD-10-CM: Z12.11 ICD-9-CM: V76.51 Morbid obesity with BMI of 40.0-44.9, adult (HCC)     ICD-10-CM: E66.01, Z68.41 
ICD-9-CM: 278.01, V85.41 Essential hypertension     ICD-10-CM: I10 
ICD-9-CM: 401.9 Morbid obesity ([facility]     ICD-10-CM: E66.01 
ICD-9-CM: 278.01 Vitals BP Pulse Temp Resp Height(growth percentile) Weight(growth percentile) 165/86 (BP 1 Location: Right arm, BP Patient Position: Sitting) 72 97.8 °F (36.6 °C) (Oral) 18 5' (1.524 m) 241 lb 6.4 oz (109.5 kg) SpO2 BMI OB Status Smoking Status 99% 47.15 kg/m2 Hysterectomy Never Smoker BMI and BSA Data Body Mass Index Body Surface Area  
 47.15 kg/m 2 2.15 m 2 Preferred Pharmacy Pharmacy Name Phone [address], [facility] [phone] Your Updated Medication List  
  
   
This list is accurate as of 18  9:14 AM.  Always use your most recent med list.  
  
  
  
  
 Comp. Stocking,Knee,Regular,Lrg Misc  
20-30mmHG  
  
 diclofenac potassium 50 mg tablet Commonly known as:  CATAFLAM  
Take 50 mg by mouth three (3) times daily. furosemide 40 mg tablet Commonly known as:  LASIX Take 1 Tab by mouth daily as needed. liraglutide 3 mg/0.5 mL (18 mg/3 mL) pen Commonly known as:  SAXENDA  
0.1 mL by SubCUTAneous route daily. Indications: WEIGHT LOSS MANAGEMENT FOR OBESE PATIENT (BMI >= 30)  
  
 multivitamin tablet Commonly known as:  ONE A DAY Take 1 tablet by mouth daily. valsartan-hydroCHLOROthiazide 320-25 mg per tablet Commonly known as:  DIOVAN-HCT Take 1 Tab by mouth daily. YUVAFEM 10 mcg Tab vaginal tablet Generic drug:  estradiol Prescriptions Printed Refills  
 liraglutide (SAXENDA) 3 mg/0.5 mL (18 mg/3 mL) pen 1 Si.1 mL by SubCUTAneous route daily. Indications: WEIGHT LOSS MANAGEMENT FOR OBESE PATIENT (BMI >= 30) Class: Print Route: SubCUTAneous Prescriptions Sent to Pharmacy Refills  
 valsartan-hydroCHLOROthiazide (DIOVAN-HCT) 320-25 mg per tablet 1 Sig: Take 1 Tab by mouth daily. Class: Normal  
 [facility] [address] [place] [address] #: [phone] Route: Oral  
  
We Performed the Following COLOGUARD TEST (FECAL DNA COLORECTAL CANCER SCREENING) [23384 CPT(R)] Follow-up Instructions Return in about 2 months (around 2018) for HTN. To-Do List   
 2018 Imaging:  LUMA MAMMO BI SCREENING INCL CAD Patient Instructions Check your blood pressure daily goal is 135/80 Bring in a log of your blood pressure Introducing [facility]! Dear Mejia Everett: Thank you for requesting a Bloomspot account. Our records indicate that you already have an active Bloomspot account. You can access your account anytime at [email or web address] Did you know that you can access your hospital and ER discharge instructions at any time in Bloomspot? You can also review all of your test results from your hospital stay or ER visit. Additional Information If you have questions, please visit the Frequently Asked Questions section of the Bloomspot website at [email or web address] Remember, Bloomspot is NOT to be used for urgent needs. For medical emergencies, dial 911. Now available from your iPhone and Android! Please provide this summary of care documentation to your next provider. Your primary care clinician is listed as RAVEN Mota. If you have any questions after today's visit, please call [phone].

## 2018-05-29 NOTE — PROGRESS NOTES
Ms. Angel Lambert is presenting to follow up on chronic medical problems    CC: Morbid Obesity (follow up)       HPI:    HTN: HTN, takes medication - valsartan-hydrochlorothiazide (160-25) at night time. Today her blood pressure is high again. BP is 165/86. She reports compliance with medication. Denies chest pain and shortness of breath. Not taking medication at home. Knee arthritis: severe OA cannot have a knee replacement until looses weight  Has had cortisone shots to her knees in the past.  Taking diclofenac at night      Bilateral feet and ankle swelling: more pronounced on the right: takes lasix 40mg PRN   Has occasional shortness of breath- patient did have TTE in 2014 at onset of symptoms which was normal. At that time patient had sleep study which was consistent with obstructive sleep apnea. SUDEEP: uses CPAP at night     Morbid obesity: has tried contrave felt sleepy. Current weight is 241lbs. He has lost 7 lbs since last visit. Reports healthy diet   Patient has struggled with her weight in the past 20 years. We prescribed saxenda and not covered by insurance     Lost 7 lbs with diet and exercise- staying more active/ has not scheduled with nutritionist.          Review of systems:  10 systems reviewed and negative other than HPI       Past Medical History:   Diagnosis Date    HTN (hypertension) 1/15/2010    Sleep apnea 1/20/2015    Sleep apnea, obstructive 1/5/2016        Past Surgical History:   Procedure Laterality Date    HX APPENDECTOMY      HX HYSTERECTOMY  approx 1987    2/2 uterine fibroids       No Known Allergies    Current Outpatient Prescriptions on File Prior to Visit   Medication Sig Dispense Refill    YUVAFEM 10 mcg tab vaginal tablet       diclofenac potassium (CATAFLAM) 50 mg tablet Take 50 mg by mouth three (3) times daily.  furosemide (LASIX) 40 mg tablet Take 1 Tab by mouth daily as needed. 30 Tab 3    Comp. Stocking,Knee,Regular,Lrg misc 20-30mmHG 1 Units 2  multivitamin (ONE A DAY) tablet Take 1 tablet by mouth daily. No current facility-administered medications on file prior to visit. family history includes Cancer in her father and mother; Hypertension in her brother, father, and sister. Social History     Social History    Marital status: SINGLE     Spouse name: N/A    Number of children: N/A    Years of education: N/A     Occupational History    Not on file. Social History Main Topics    Smoking status: Never Smoker    Smokeless tobacco: Never Used    Alcohol use 2.5 oz/week     5 Glasses of wine per week      Comment: 1 glass per night    Drug use: No    Sexual activity: Yes     Partners: Male     Birth control/ protection: Condom     Other Topics Concern    Not on file     Social History Narrative       Visit Vitals    /86 (BP 1 Location: Right arm, BP Patient Position: Sitting)    Pulse 72    Temp 97.8 °F (36.6 °C) (Oral)    Resp 18    Ht 5' (1.524 m)    Wt 241 lb 6.4 oz (109.5 kg)    SpO2 99%    BMI 47.15 kg/m2     General:  Well appearing female no acute distress, obese  HEENT:   PERRL,normal conjunctiva. External ear and canals normal, TMs normal.  Hearing normal to voice. Neck:  Supple. Thyroid normal size, nontender, without nodules. No carotid bruit. No masses or lymphadenopathy  Respiratory: no respiratory distress,  no wheezing, no rhonchi, no rales. No chest wall tenderness. Cardiovascular:  RRR, normal S1S2, no murmur. Gastrointestinal: normal bowel sounds, soft, nontender, without masses. No hepatosplenomegaly. Extremities +2 pulses, 1+ pitting edema, normal sensation   Musculoskeletal:  Normal gait. Normal digits and nails. Skin:  No rash, no lesions, no ulcers. Skin warm, normal turgor, without induration or nodules. Neuro:  A and OX4, fluent speech, cranial nerves normal 2-12.   Psych:  Normal affect      Lab Results   Component Value Date/Time    WBC 5.8 03/26/2018 10:59 AM    HGB 12.2 03/26/2018 10:59 AM    HCT 38.9 03/26/2018 10:59 AM    PLATELET 135 08/62/4965 10:59 AM    MCV 85 03/26/2018 10:59 AM     Lab Results   Component Value Date/Time    Sodium 141 01/31/2017 11:33 AM    Potassium 4.0 01/31/2017 11:33 AM    Chloride 96 01/31/2017 11:33 AM    CO2 29 01/31/2017 11:33 AM    Anion gap 11 09/08/2010 09:44 AM    Glucose 94 01/31/2017 11:33 AM    BUN 14 01/31/2017 11:33 AM    Creatinine 1.03 (H) 01/31/2017 11:33 AM    BUN/Creatinine ratio 14 01/31/2017 11:33 AM    GFR est  07/15/2014 04:47 PM    GFR est non-AA 90 07/15/2014 04:47 PM    Calcium 9.3 01/31/2017 11:33 AM     Lab Results   Component Value Date/Time    Cholesterol, total 215 (H) 03/26/2018 10:59 AM    HDL Cholesterol 56 03/26/2018 10:59 AM    LDL, calculated 133 (H) 03/26/2018 10:59 AM    VLDL, calculated 26 03/26/2018 10:59 AM    Triglyceride 129 03/26/2018 10:59 AM    CHOL/HDL Ratio 4.0 09/08/2010 09:44 AM     Lab Results   Component Value Date/Time    TSH 2.200 03/26/2018 10:59 AM     Lab Results   Component Value Date/Time    Hemoglobin A1c 5.5 03/26/2018 10:59 AM     Lab Results   Component Value Date/Time    VITAMIN D, 25-HYDROXY 25.0 (L) 03/26/2018 10:59 AM                   Assessment and Plan:     1. Essential hypertension  Blood pressure is elevated today, again. Therefore increased divan to 320mg-25mg   - recommend checking blood pressure with goal of less than  130/85 on average. Follow low sodium diet. Given DASH diet guidelines. Recommend cardiovascular exercise regularly. Call with blood pressure readings. - valsartan-hydroCHLOROthiazide (DIOVAN-HCT)320-25 mg per tablet; Take 1 Tab by mouth daily. 2. Morbid obesity   - lost 7 lbs since last visit in March 2018. Obstructive sleep apnea, prediabetes, hypertension, severe OA of the knees. I do think patient will be a good candidate for bariatric surgery.  -Patient did not tolerate contrave.   Trial of Saxenda re prescribed today ( had issues with previous prescription)  to start with 0.6 mg and increase as tolerated. Discussed side effects including thyroid cancer in rats. Patient has no history of thyroid disease.  - reminded to schedule appointment with nutritionist - Ananya FORDE [facility]  - liraglutide (SAXENDA) 3 mg/0.5 mL (18 mg/3 mL) pen; 0.1 mL by SubCUTAneous route daily. Indications: WEIGHT LOSS MANAGEMENT FOR OBESE PATIENT (BMI >= 30)  Dispense: 1 Adjustable Dose Pre-filled Pen Syringe; Refill: 1      3. Primary osteoarthritis of both knees  -Debilitating arthritis of both knees. Working on pool exercises. Advised to do pool exercises. 4. Pre-diabetes  -Counseled on weight loss and exercise and diet. Referral to nutritionist      5. Edema of both legs  -Chronic swelling of legs she has had an echo in the past which showed normal heart structure and function. Suspect venous stasis. Patient takes Lasix as needed. Follow-up Disposition:  Return in about 2 months (around 7/29/2018) for HTN.      Mike Menjivar MD

## 2018-05-29 NOTE — PROGRESS NOTES
Reviewed record in preparation for visit and have obtained necessary documentation. Identified pt with two pt identifiers(name and ). Chief Complaint   Patient presents with    Morbid Obesity     follow up       Health Maintenance Due   Topic Date Due    DTaP/Tdap/Td  (1 - Tdap) 1975    Stool testing for trace blood  2016    Breast Cancer Screening  2018       Ms. Edwards has a reminder for a \"due or due soon\" health maintenance. I have asked that she discuss this further with her primary care provider for follow-up on this health maintenance. Coordination of Care Questionnaire:  :     1) Have you been to an emergency room, urgent care clinic since your last visit? no   Hospitalized since your last visit? no             2) Have you seen or consulted any other health care providers outside of [address] [place] [address] since your last visit? no  (Include any pap smears or colon screenings in this section.)    3) In the event something were to happen to you and you were unable to speak on your behalf, do you have an Advance Directive/ Living Will in place stating your wishes? NO    Do you have an Advance Directive on file? no    4) Are you interested in receiving information on Advance Directives? NO    Patient is accompanied by self I have received verbal consent from Arcadio Bernard to discuss any/all medical information while they are present in the room.

## 2018-06-01 ENCOUNTER — HOSPITAL ENCOUNTER (OUTPATIENT)
Dept: NUTRITION | Age: 63
Discharge: HOME | End: 2018-06-01
Payer: COMMERCIAL

## 2018-06-01 PROCEDURE — 97802 MEDICAL NUTRITION INDIV IN: CPT | Performed by: DIETITIAN, REGISTERED

## 2018-06-01 NOTE — PROGRESS NOTES
[address] [address] [address] [facility], [address] [place] [address] Erin Gallagher [place]  Phone: [phone] Fax: [phone]   Nutrition Assessment  Medical Nutrition Therapy   Outpatient Initial Evaluation         Patient Name: Nacho Turner : 1955   Treatment Diagnosis: Morbid Obesity, HTN   Referral Source: Christina Jimenez MD Start of [facility]): 2018     Gender: female Age: 60 y.o. Ht: 60 in Wt: 238.6 lb  kg   BMI: 46.6 BMR   Male  BMR Female 1559   Anthropometrics Assessment: Per BMI, pt is considered morbidly obese. Moderate abdominal adiposity is evident based on visual observation      Past Medical History includes: Hyperlipidemia, sleep apnea, primary osteoarthritis of both knees     Pertinent Medications:     Valsartan/Hctz, Vitamin D3 VItafusion gummies, One A Day gummy, Hair Skin and Nails, Alavert    Biochemical Data:   Lab Results   Component Value Date/Time    Hemoglobin A1c 5.5 2018 10:59 AM     Lab Results   Component Value Date/Time    Cholesterol, total 215 (H) 2018 10:59 AM    HDL Cholesterol 56 2018 10:59 AM    LDL, calculated 133 (H) 2018 10:59 AM    VLDL, calculated 26 2018 10:59 AM    Triglyceride 129 2018 10:59 AM    CHOL/HDL Ratio 4.0 2010 09:44 AM     Lab Results   Component Value Date/Time    ALT (SGPT) 15 2017 11:33 AM    AST (SGOT) 20 2017 11:33 AM    Alk. phosphatase 76 2017 11:33 AM    Bilirubin, total 0.5 2017 11:33 AM     Lab Results   Component Value Date/Time    Creatinine 1.03 (H) 2017 11:33 AM     Lab Results   Component Value Date/Time    BUN 14 2017 11:33 AM     No results found for: MCACR, MCA1, MCA2, MCA3, MCAU, MCAU2, MCALPOCT     Subjective/Assessment:   Pt is a 60yo female here today for help with weight loss.  She has recently lost about 7# between her last doctors visits and is unsure of how this occurred. She notes she has restarted water aerobics 3-4 times per week, stopped eating chips, and is drinking more water. She also notes that in the winter times she has much more difficulty with portion control. She is retired and often has to drive family members around which can get in the way of her ability to go to exercise class. Current Eating Patterns: 2-3 meals per day. Sometimes skips a meal if she goes to water aerobics in the morning or isn't hungry. Minimal snacking between meals. Sometimes having 10 cups popcorn for dinner instead of a full meal.   Loves vegetables, salads, lean protein sources, and all fruits. Cooks fried fish a few times per week for jagjit. Drinks only water, lemon water, and her detox tea wich she adds honey but is not sure of what amount. Every 1-2 weeks she gets a Cookout milkshake (950kcal) or McCafe frozen drink (550kcal). She does not feels she has made any big food changes to account for the 7 lb loss. Estimate Needs   Calories: 1500 Protein: 94 Carbs: 169 Fat: 50   Kcal/day  g/day  g/day  g/day        percent: 25  45  30               Education & Recommendations provided: Educated pt on the balanced plate and appropriate serving sizes. Educated on how to read labels for sodium and advised choosing foods with 350mg of sodium per serving or less. Encouraged pt to choose lower calorie treat drinks like kid's size edelmira's frosty instead of cookout milkshake if not ready to decrease the number of times per month she gets them. Provided positive reinforcement for increased activity. She set goal to do water aerobics or chair exercises more often.     Handouts Provided: []  Carbohydrates  []  Protein  []  Fiber  []  Serving Sizes  [x]  Meal and Snack Ideas  []  Food Journals []  Diabetes  []  Cholesterol  []  Sodium  []  Gen Nutr Guidelines  []  SBGM Guidelines  []  Others:   Information Reviewed with: pt   Readiness to Change Stage: [] Pre-contemplative    []  Contemplative  [x]  Preparation               []  Action                  []  Maintenance   Potential Barriers to Learning: []  Decline in memory    []  Language barrier   []  Other:  []  Emotional                  [x]  Limited mobility  []  Lack of motivation     [] Vision, hearing or cognitive impairment   Expected Compliance: Good      Nutritional Goal - To promote lifestyle changes to result in:    [x]  Weight loss  []  Improved diabetic control  []  Decreased cholesterol levels  [x]  Decreased blood pressure  []  Weight maintenance []  Preventing any interactions associated with food allergies  []  Adequate weight gain toward goal weight  []  Other:        Patient Goals:  SMART goals - decrease total calories with goal to use balanced plate created today to eat 2-3 meals per day and 1-2 optional snacks.   - Improve physical activity w/goal to continue water aerobics and add in chair exercises if not able to make class  - Reduce empty calorie intake by switching to lower calorie treat drink (options provided)     Dietitian Signature: Juliocesar Conrad MS RD Date: 6/1/2018   Follow-up: 4 weeks Time: 11:30 AM

## 2018-06-28 ENCOUNTER — APPOINTMENT (OUTPATIENT)
Dept: NUTRITION | Age: 63
End: 2018-06-28
Payer: COMMERCIAL

## 2018-07-05 ENCOUNTER — HOSPITAL ENCOUNTER (OUTPATIENT)
Dept: NUTRITION | Age: 63
Discharge: HOME | End: 2018-07-05
Payer: COMMERCIAL

## 2018-07-05 PROCEDURE — 97803 MED NUTRITION INDIV SUBSEQ: CPT

## 2018-07-05 PROCEDURE — 97803 MED NUTRITION INDIV SUBSEQ: CPT | Performed by: DIETITIAN, REGISTERED

## 2018-07-05 NOTE — PROGRESS NOTES
NUTRITION  FOLLOW-UP TREATMENT NOTE  Patient Name: Nathalia Lewis         Date: 2018  : 1955    YES Patient  Verified  Diagnosis: Morbid Obesity, HTN   In time:   8:30am             Out time:   9:00am   Total Treatment Time (min):   30     SUBJECTIVE/ASSESSMENT    Changes in medication or medical history? Any new allergies, surgeries or procedures?   /NO    If yes, update Summary List   Pt has returned for follow up. She has not kept up with her water aerobics due to starting to remodel and clean her house (mostly sedentary tasks cleaning papers out). She does not feel like eating when it is hot outside and feels she did ok with following the Balanced Plate meals. She only had 1 cookout milkshake in the past 4 weeks. Reviewed how many calories were in this and that any added sugars to drinks and foods increase total calorie intake. She will complete sugary drink handout at home to see how much is in other drinks she has or makes. She is interested in the sleeve bariatric surgery. Provided the 9month post op diet for her to try and report back. Her sister is moving in with her. She feels this will help her to stay focused on her positive goals as her sister is \"healthy\" and likes to workout too. Reviewed goals and she expressed comprehension, high motivation, and compliance is expected. Current Wt: 241.2 Previous Wt: 238.6 Wt Change: +2.6     Achievement of Goals: - decrease total calories with goal to use balanced plate created today to eat 2-3 meals per day and 1-2 optional snacks. -met 60-80%. continued  - Improve physical activity w/goal to continue water aerobics and add in chair exercises if not able to make class- not met. Plans to resume next week. Goal continued. - Reduce empty calorie intake by switching to lower calorie treat drink (options provided) - decreased frequency of milk shakes.  continued         Patient Education:  [x]  Review current plan with patient   []  Other: Handouts/  Information Provided: []  Carbohydrates  []  Protein  []  Fiber  []  Serving Sizes  []  Fluids  []  General guidelines []  Diabetes  []  Cholesterol  []  Sodium  []  SBGM  []  Food Journals  [x]  Others: 9mo post op diet plan for bariatric surgery     New Patient Goals: -test out the 9mo post op bariatric diet plan to see if it is something you would be able to do long term  -CONTINUE GOAL: - decrease total calories with goal to use balanced plate created today to eat 2-3 meals per day and 1-2 optional snacks  Return to goal: Improve physical activity w/goal to continue water aerobics and add in chair exercises if not able to make class  -continue choosing sugary beverages less often.  Decrease frequency of milkshakes 1x/month     PLAN    [x]  Continue on current plan []  Follow-up PRN   []  Discharge due to :    [x]  Next appt: July 30 @ 3:00pm     Dietitian: Tripp Guerra MS RD    Date: 7/5/2018 Time: 9:09 AM

## 2018-07-30 ENCOUNTER — OFFICE VISIT (OUTPATIENT)
Dept: INTERNAL MEDICINE CLINIC | Age: 63
End: 2018-07-30

## 2018-07-30 ENCOUNTER — HOSPITAL ENCOUNTER (OUTPATIENT)
Dept: NUTRITION | Age: 63
Discharge: HOME | End: 2018-07-30
Payer: COMMERCIAL

## 2018-07-30 VITALS
HEART RATE: 72 BPM | OXYGEN SATURATION: 99 % | TEMPERATURE: 98.1 F | RESPIRATION RATE: 18 BRPM | BODY MASS INDEX: 46.53 KG/M2 | WEIGHT: 237 LBS | SYSTOLIC BLOOD PRESSURE: 158 MMHG | DIASTOLIC BLOOD PRESSURE: 81 MMHG | HEIGHT: 60 IN

## 2018-07-30 DIAGNOSIS — G47.33 SLEEP APNEA, OBSTRUCTIVE: ICD-10-CM

## 2018-07-30 DIAGNOSIS — I10 ESSENTIAL HYPERTENSION: Primary | ICD-10-CM

## 2018-07-30 DIAGNOSIS — M17.10 PRIMARY OSTEOARTHRITIS OF KNEE, UNSPECIFIED LATERALITY: ICD-10-CM

## 2018-07-30 PROCEDURE — 97803 MED NUTRITION INDIV SUBSEQ: CPT | Performed by: DIETITIAN, REGISTERED

## 2018-07-30 RX ORDER — DICLOFENAC POTASSIUM 50 MG/1
50 TABLET, FILM COATED ORAL
Qty: 90 TAB | Refills: 1 | Status: SHIPPED | OUTPATIENT
Start: 2018-07-30 | End: 2019-03-06 | Stop reason: SDUPTHER

## 2018-07-30 RX ORDER — LOSARTAN POTASSIUM AND HYDROCHLOROTHIAZIDE 12.5; 5 MG/1; MG/1
1 TABLET ORAL DAILY
Qty: 30 TAB | Refills: 1 | Status: SHIPPED | OUTPATIENT
Start: 2018-07-30 | End: 2018-08-28 | Stop reason: SDUPTHER

## 2018-07-30 NOTE — PROGRESS NOTES
Ms. Hernandez Prior is presenting to follow up on chronic medical problems    CC:  Hypertension (follow up)       HPI:    HTN: HTN, takes medication - valsartan-hydrochlorothiazide (160-25) at night time. Patient is concerned with recall and would like to switch medication. Knee arthritis: severe OA cannot have a knee replacement until looses weight  Has had cortisone shots to her knees in the past.  Taking diclofenac at night 2-3 times a week and needs  Refill   Reminded to take medication with food     Bilateral feet and ankle swelling: more pronounced on the right: takes lasix 40mg PRN   Has occasional shortness of breath- patient did have TTE in 2014 at onset of symptoms which was normal. At that time patient had sleep study which was consistent with obstructive sleep apnea. SUDEEP: uses CPAP at night     Morbid obesity: has tried contrave felt sleepy. Current weight is 241lbs. She has lost 11 lbs since March 2018  Reports healthy diet   Patient has struggled with her weight in the past 20 years. We prescribed saxenda and not covered by insurance, contrave made patient sleepy, and \" messed with mind\"     Doing water aerobics          Review of systems:  10 systems reviewed and negative other than HPI       Past Medical History:   Diagnosis Date    HTN (hypertension) 1/15/2010    Sleep apnea 1/20/2015    Sleep apnea, obstructive 1/5/2016        Past Surgical History:   Procedure Laterality Date    HX APPENDECTOMY      HX HYSTERECTOMY  approx 1987    2/2 uterine fibroids       No Known Allergies    Current Outpatient Prescriptions on File Prior to Visit   Medication Sig Dispense Refill    valsartan-hydroCHLOROthiazide (DIOVAN-HCT) 320-25 mg per tablet Take 1 Tab by mouth daily. 90 Tab 1    YUVAFEM 10 mcg tab vaginal tablet       furosemide (LASIX) 40 mg tablet Take 1 Tab by mouth daily as needed. 30 Tab 3    Comp. Stocking,Knee,Regular,Lrg misc 20-30mmHG 1 Units 2    multivitamin (ONE A DAY) tablet Take 1 tablet by mouth daily.  liraglutide (SAXENDA) 3 mg/0.5 mL (18 mg/3 mL) pen 0.1 mL by SubCUTAneous route daily. Indications: WEIGHT LOSS MANAGEMENT FOR OBESE PATIENT (BMI >= 30) 1 Adjustable Dose Pre-filled Pen Syringe 1    diclofenac potassium (CATAFLAM) 50 mg tablet Take 50 mg by mouth three (3) times daily. No current facility-administered medications on file prior to visit. family history includes Cancer in her father and mother; Hypertension in her brother, father, and sister. Social History     Social History    Marital status: SINGLE     Spouse name: N/A    Number of children: N/A    Years of education: N/A     Occupational History    Not on file. Social History Main Topics    Smoking status: Never Smoker    Smokeless tobacco: Never Used    Alcohol use 2.5 oz/week     5 Glasses of wine per week      Comment: 1 glass per night    Drug use: No    Sexual activity: Yes     Partners: Male     Birth control/ protection: Condom     Other Topics Concern    Not on file     Social History Narrative       Visit Vitals    /81 (BP 1 Location: Right arm, BP Patient Position: Sitting)    Pulse 72    Temp 98.1 °F (36.7 °C) (Oral)    Resp 18    Ht 5' (1.524 m)    Wt 237 lb (107.5 kg)    SpO2 99%    BMI 46.29 kg/m2     General:  Well appearing female no acute distress, obese  HEENT:   PERRL,normal conjunctiva. External ear and canals normal, TMs normal.  Hearing normal to voice. Neck:  Supple. Thyroid normal size, nontender, without nodules. No carotid bruit. No masses or lymphadenopathy  Respiratory: no respiratory distress,  no wheezing, no rhonchi, no rales. No chest wall tenderness. Cardiovascular:  RRR, normal S1S2, no murmur. Gastrointestinal: normal bowel sounds, soft, nontender, without masses. No hepatosplenomegaly. Extremities +2 pulses, 1+ pitting edema, normal sensation   Musculoskeletal:  Normal gait. Normal digits and nails.     Skin:  No rash, no lesions, no ulcers. Skin warm, normal turgor, without induration or nodules. Neuro:  A and OX4, fluent speech, cranial nerves normal 2-12. Psych:  Normal affect      Lab Results   Component Value Date/Time    WBC 5.8 03/26/2018 10:59 AM    HGB 12.2 03/26/2018 10:59 AM    HCT 38.9 03/26/2018 10:59 AM    PLATELET 521 06/41/2080 10:59 AM    MCV 85 03/26/2018 10:59 AM     Lab Results   Component Value Date/Time    Sodium 141 01/31/2017 11:33 AM    Potassium 4.0 01/31/2017 11:33 AM    Chloride 96 01/31/2017 11:33 AM    CO2 29 01/31/2017 11:33 AM    Anion gap 11 09/08/2010 09:44 AM    Glucose 94 01/31/2017 11:33 AM    BUN 14 01/31/2017 11:33 AM    Creatinine 1.03 (H) 01/31/2017 11:33 AM    BUN/Creatinine ratio 14 01/31/2017 11:33 AM    GFR est  07/15/2014 04:47 PM    GFR est non-AA 90 07/15/2014 04:47 PM    Calcium 9.3 01/31/2017 11:33 AM     Lab Results   Component Value Date/Time    Cholesterol, total 215 (H) 03/26/2018 10:59 AM    HDL Cholesterol 56 03/26/2018 10:59 AM    LDL, calculated 133 (H) 03/26/2018 10:59 AM    VLDL, calculated 26 03/26/2018 10:59 AM    Triglyceride 129 03/26/2018 10:59 AM    CHOL/HDL Ratio 4.0 09/08/2010 09:44 AM     Lab Results   Component Value Date/Time    TSH 2.200 03/26/2018 10:59 AM     Lab Results   Component Value Date/Time    Hemoglobin A1c 5.5 03/26/2018 10:59 AM     Lab Results   Component Value Date/Time    VITAMIN D, 25-HYDROXY 25.0 (L) 03/26/2018 10:59 AM                   Assessment and Plan:     1. Essential hypertension  Stopped diovan, patient is concerned with recall. Changed to losartan- 50mg - HCTZ   - recommend checking blood pressure with goal of less than  130/85 on average. Follow low sodium diet. Given DASH diet guidelines. Recommend cardiovascular exercise regularly. Call with blood pressure readings.   - HYZAAR 50/12.5  - follow up in one month for BP check    2. Morbid obesity   - lost 11 lbs since last visit in March 2018.  Obstructive sleep apnea, prediabetes, hypertension, severe OA of the knees. I do think patient will be a good candidate for bariatric surgery.  -Patient did not tolerate contrave. Trial of Saxenda not covered by insurance  - reminded to schedule appointment with nutritionist - Jesse Harris  - patient never scheduled appointment with Dr Azam Arnold      3. Primary osteoarthritis of both knees  -Debilitating arthritis of both knees. Working on pool exercises  - refilled diclofenac, and discussed side effects, takes it 1-2 times per week     4. Pre-diabetes  -Counseled on weight loss and exercise and diet. Referral to nutritionist      5. Edema of both legs  -Chronic swelling of legs she has had an echo in the past which showed normal heart structure and function. Suspect venous stasis. Patient takes Lasix as needed.     6. SUDEEP: on CPAP    Lab at follow up in one month     Follow-up Disposition: Not on Juanjose Edwards MD

## 2018-07-30 NOTE — PROGRESS NOTES
NUTRITION  FOLLOW-UP TREATMENT NOTE Patient Name: Lalita Mijares         Date: 2018 : 1955    YES Patient  Verified Diagnosis:  Morbid Obesity, HTN In time:   3:00pm             Out time:   3:30pm  
Total Treatment Time (min):   30  
 
SUBJECTIVE/ASSESSMENT Changes in medication or medical history? Any new allergies, surgeries or procedures? Yes   Losartan has been started, Valsartan d/c Pt has returned for follow up. She continues to go multiple days per week without interest or hunger. She has been working to eat smaller snacks using healthier options in the house multiple times per day instead of not eating anything. She notes she went to a cookout over the weekend and ate less than she would have previously (portion control) but notes she was very hungry before this meal. Discussed hunger and fullness as it appears she only gets hungry for foods that she considers not very good for her or foods she only eats occasionally. She continues to drink sugar sweetened beverages in large quantity about 1 time per month for each beverage. Discussed the calorie disproportion in her current diet between food and drink. She was not able to follow the 9month post-op bariatric plan provided as she states she does not like to feel restricted. She feels the meal plan we had set up previously was restrictive, but she would prefer to eat a Healthy Choice meal. Agreed to pt having a Healthy Choice low sodium meal for one of her meals each day to encourage eating 2-3 meals consistently. Provided positive reinforcement for pts portion control and exercise. Reviewed and reframed goals. She expressed comprehension, high motivation, and compliance is expected. Current Wt: 238.2 Previous Wt: 241. 2 Wt Change: -3.0 Achievement of Goals: -test out the 9mo post op bariatric diet plan to see if it is something you would be able to do long term - unable to follow due to feeling restricted. D/c 
-CONTINUE GOAL: - decrease total calories with goal to use balanced plate created today to eat 2-3 meals per day and 1-2 optional snacks - improved consistency of 2 meals or small snacks most days. Continue working towards goal 
Return to goal: Improve physical activity w/goal to continue water aerobics and add in chair exercises if not able to make class- met water aerobics 2-3 times per week goal. Working to increase to 3-4 days per week. Distance and time are her biggest barriers. No chair exercises tried. Reviewed and continued. -continue choosing sugary beverages less often. Decrease frequency of milkshakes 1x/month -limited each sugar sweetened beverage to 1 large per month (milkshake, soda, lemonade. Each once per month) goal continued Patient Education:  [x]  Review current plan with patient  
[]  Other:   
Handouts/ Information Provided: []  Carbohydrates 
[]  Protein []  Fiber 
[]  Serving Sizes []  Fluids 
[]  General guidelines []  Diabetes []  Cholesterol 
[]  Sodium []  SBGM []  Food Journals 
[x]  Others: Sugar sweetened beverages New Patient Goals: -exercise: work towards 3-4 days per week of water aerobics. Look into chair exercises for activity at home that does not hurt knees -CONTINUE GOAL: decrease total calories with goal to use balanced plate created today to eat 2-3 meals per day and 1-2 optional snacks  
-one meal per day can be a Healthy Choice Frozen dinner (low sodium) PLAN [x]  Continue on current plan []  Follow-up PRN  
[]  Discharge due to :   
[x]  Next appt: Aug 28 @ 8:30am (pt to check insurance) Dietitian: Ulysses Julien MS RD Date: 7/30/2018 Time: 6:48 PM

## 2018-07-30 NOTE — MR AVS SNAPSHOT
[address] 
[phone] Patient: Franchesca Ramsay MRN:  [id number] Visit Information Date & Time Provider Department Dept. Phone Encounter #  
 7/30/2018  8:15 AM Ronnie, [address] [facility] [address] [phone] [id number] Follow-up Instructions Return in about 4 weeks (around 8/27/2018) for blood pressure. Upcoming Health Maintenance Date Due DTaP/Tdap/Td series (1 - Tdap) 7/13/1975 Cologuard Q 3 Years 7/13/2004 BREAST CANCER SCRN MAMMOGRAM 9/13/2018 Influenza Age 5 to Adult 8/1/2018 PAP AKA CERVICAL CYTOLOGY 10/2/2020 Allergies as of 7/30/2018  Review Complete On: 7/30/2018 By: MD Ronnie  
 No Known Allergies Current Immunizations  Reviewed on 6/5/2015 Name Date  
 TB Skin Test (PPD) Intradermal 4/9/2013 Zoster Vaccine, Live 6/5/2015  9:08 AM  
  
 Not reviewed this visit You Were Diagnosed With   
  
 Codes Comments Essential hypertension    -  Primary ICD-10-CM: I10 
ICD-9-CM: 401.9 Sleep apnea, obstructive     ICD-10-CM: G47.33 
ICD-9-CM: 327.23 Primary osteoarthritis of knee, unspecified laterality     ICD-10-CM: M17.10 ICD-9-CM: 715.16 Vitals BP Pulse Temp Resp Height(growth percentile) Weight(growth percentile) 158/81 (BP 1 Location: Right arm, BP Patient Position: Sitting) 72 98.1 °F (36.7 °C) (Oral) 18 5' (1.524 m) 237 lb (107.5 kg) SpO2 BMI OB Status Smoking Status 99% 46.29 kg/m2 Hysterectomy Never Smoker BMI and BSA Data Body Mass Index Body Surface Area  
 46.29 kg/m 2 2.13 m 2 Preferred Pharmacy Pharmacy Name Phone Mati Schmid, [facility] [phone] Your Updated Medication List  
  
   
This list is accurate as of 7/30/18  8:51 AM.  Always use your most recent med list.  
  
  
  
  
 Comp.Stocking,Knee,Regular,Lrg Misc  
20-30mmHG  
  
 diclofenac potassium 50 mg tablet Commonly known as:  CATAFLAM  
Take 1 Tab by mouth daily as needed. Indications: OSTEOARTHRITIS  
  
 furosemide 40 mg tablet Commonly known as:  LASIX Take 1 Tab by mouth daily as needed. losartan-hydroCHLOROthiazide 50-12.5 mg per tablet Commonly known as:  HYZAAR Take 1 Tab by mouth daily. multivitamin tablet Commonly known as:  ONE A DAY Take 1 tablet by mouth daily. YUVAFEM 10 mcg Tab vaginal tablet Generic drug:  estradiol Prescriptions Sent to Pharmacy Refills  
 losartan-hydroCHLOROthiazide (HYZAAR) 50-12.5 mg per tablet 1 Sig: Take 1 Tab by mouth daily. Class: Normal  
 Pharmacy: [address] [id number], [address] [place] [address] Ph #: [phone] Route: Oral  
 diclofenac potassium (CATAFLAM) 50 mg tablet 1 Sig: Take 1 Tab by mouth daily as needed. Indications: OSTEOARTHRITIS Class: Normal  
 Pharmacy: [address] [place] [address], [address] [place] [address] Ph #: [phone] Route: Oral  
  
Follow-up Instructions Return in about 4 weeks (around 8/27/2018) for blood pressure. To-Do List   
 07/30/2018 3:00 PM  
  Appointment with Shauna Carranza at . [facility] ([phone]) Patient Instructions Changed blood pressure medication to losartan 50mg Hydrochlorothiazide 12.5 mg  
Return rajani ne month to follow up on blood pressure Introducing [facility]! Dear Suzanne Vickers: Thank you for requesting a [employer] account. Our records indicate that you already have an active [email or web address] account. You can access your account anytime at [email or web address] Did you know that you can access your hospital and ER discharge instructions at any time in [email or web address]? You can also review all of your test results from your hospital stay or ER visit. Additional Information If you have questions, please visit the Frequently Asked Questions section of the [email or web address] website at [email or web address] Remember, [employer] is NOT to be used for urgent needs. For medical emergencies, dial 911. Now available from your iPhone and Android! Please provide this summary of care documentation to your next provider. Your primary care clinician is listed as RAVEN Mota. If you have any questions after today's visit, please call [phone].

## 2018-07-30 NOTE — PATIENT INSTRUCTIONS
Changed blood pressure medication to losartan 50mg Hydrochlorothiazide 12.5 mg   Return rajani ne month to follow up on blood pressure

## 2018-07-30 NOTE — PROGRESS NOTES
Reviewed record in preparation for visit and have obtained necessary documentation. Identified pt with two pt identifiers(name and ). Chief Complaint   Patient presents with    Hypertension     follow up       Health Maintenance Due   Topic Date Due    DTaP/Tdap/Td  (1 - Tdap) 1975    Stool testing FIT-DNA  2004    Breast Cancer Screening  2018       Ms. Edwards has a reminder for a \"due or due soon\" health maintenance. I have asked that she discuss this further with her primary care provider for follow-up on this health maintenance. Coordination of Care Questionnaire:  :     1) Have you been to an emergency room, urgent care clinic since your last visit? no   Hospitalized since your last visit? no             2) Have you seen or consulted any other health care providers outside of [address] [place] [address] since your last visit? no  (Include any pap smears or colon screenings in this section.)    3) In the event something were to happen to you and you were unable to speak on your behalf, do you have an Advance Directive/ Living Will in place stating your wishes? NO    Do you have an Advance Directive on file? no    4) Are you interested in receiving information on Advance Directives? NO    Patient is accompanied by self I have received verbal consent from Gustavo Coto to discuss any/all medical information while they are present in the room.

## 2018-08-28 ENCOUNTER — OFFICE VISIT (OUTPATIENT)
Dept: INTERNAL MEDICINE CLINIC | Age: 63
End: 2018-08-28

## 2018-08-28 ENCOUNTER — HOSPITAL ENCOUNTER (OUTPATIENT)
Dept: NUTRITION | Age: 63
Discharge: HOME | End: 2018-08-28
Payer: COMMERCIAL

## 2018-08-28 VITALS
SYSTOLIC BLOOD PRESSURE: 150 MMHG | HEART RATE: 74 BPM | BODY MASS INDEX: 47.91 KG/M2 | RESPIRATION RATE: 18 BRPM | DIASTOLIC BLOOD PRESSURE: 80 MMHG | TEMPERATURE: 97.8 F | HEIGHT: 60 IN | OXYGEN SATURATION: 99 % | WEIGHT: 244 LBS

## 2018-08-28 DIAGNOSIS — R73.03 PRE-DIABETES: ICD-10-CM

## 2018-08-28 DIAGNOSIS — I10 ESSENTIAL HYPERTENSION: Primary | ICD-10-CM

## 2018-08-28 DIAGNOSIS — E66.01 MORBID OBESITY WITH BMI OF 40.0-44.9, ADULT (HCC): ICD-10-CM

## 2018-08-28 DIAGNOSIS — M17.0 PRIMARY OSTEOARTHRITIS OF BOTH KNEES: ICD-10-CM

## 2018-08-28 DIAGNOSIS — R06.2 WHEEZING: ICD-10-CM

## 2018-08-28 PROCEDURE — 97803 MED NUTRITION INDIV SUBSEQ: CPT | Performed by: DIETITIAN, REGISTERED

## 2018-08-28 RX ORDER — LOSARTAN POTASSIUM AND HYDROCHLOROTHIAZIDE 12.5; 5 MG/1; MG/1
1 TABLET ORAL DAILY
Qty: 90 TAB | Refills: 1 | Status: SHIPPED | OUTPATIENT
Start: 2018-08-28 | End: 2018-11-27 | Stop reason: ALTCHOICE

## 2018-08-28 NOTE — PROGRESS NOTES
Reviewed record in preparation for visit and have obtained necessary documentation. Identified pt with two pt identifiers(name and ). Chief Complaint Patient presents with  Hypertension Health Maintenance Due Topic Date Due  
 DTaP/Tdap/Td  (1 - Tdap) 1975  Stool testing FIT-DNA  2004  Flu Vaccine  2018  Breast Cancer Screening  2018 Ms. Edwards has a reminder for a \"due or due soon\" health maintenance. I have asked that she discuss this further with her primary care provider for follow-up on this health maintenance. Coordination of Care Questionnaire: 
:  
 
1) Have you been to an emergency room, urgent care clinic since your last visit? no  
Hospitalized since your last visit? no          
 
2) Have you seen or consulted any other health care providers outside of [address] [place] [address] since your last visit? no  (Include any pap smears or colon screenings in this section.) 3) In the event something were to happen to you and you were unable to speak on your behalf, do you have an Advance Directive/ Living Will in place stating your wishes? NO Do you have an Advance Directive on file? no 
 
4) Are you interested in receiving information on Advance Directives? NO Patient is accompanied by self I have received verbal consent from Mark Vazquez to discuss any/all medical information while they are present in the room.
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
## 2018-08-28 NOTE — MR AVS SNAPSHOT
[address] [facility]. 
[phone] Patient: Evangelist Chu MRN:  [id number] Visit Information Date & Time Provider Department Dept. Phone Encounter #  
 8/28/2018  9:30 AM Ronnie, [address] [place] [address],[address] [phone] [id number] Follow-up Instructions Return in about 3 months (around 11/28/2018) for HTN. Upcoming Health Maintenance Date Due DTaP/Tdap/Td series (1 - Tdap) 7/13/1975 Cologuard Q 3 Years 7/13/2004 Influenza Age 5 to Adult 8/1/2018 BREAST CANCER SCRN MAMMOGRAM 9/13/2018 PAP AKA CERVICAL CYTOLOGY 10/2/2020 Allergies as of 8/28/2018  Review Complete On: 8/28/2018 By: MD Ronnie  
 No Known Allergies Current Immunizations  Reviewed on 6/5/2015 Name Date  
 TB Skin Test (PPD) Intradermal 4/9/2013 Zoster Vaccine, Live 6/5/2015  9:08 AM  
  
 Not reviewed this visit You Were Diagnosed With   
  
 Codes Comments Essential hypertension    -  Primary ICD-10-CM: I10 
ICD-9-CM: 401.9 Pre-diabetes     ICD-10-CM: R73.03 
ICD-9-CM: 790.29 Morbid obesity with BMI of 40.0-44.9, adult (HCC)     ICD-10-CM: E66.01, Z68.41 
ICD-9-CM: 278.01, V85.41 Primary osteoarthritis of both knees     ICD-10-CM: M17.0 ICD-9-CM: 715.16 Wheezing     ICD-10-CM: R06.2 ICD-9-CM: 786.07 Vitals BP Pulse Temp Resp Height(growth percentile) Weight(growth percentile) 150/80 (BP 1 Location: Right arm, BP Patient Position: Sitting) 74 97.8 °F (36.6 °C) (Oral) 18 5' (1.524 m) 244 lb (110.7 kg) SpO2 BMI OB Status Smoking Status 99% 47.65 kg/m2 Hysterectomy Never Smoker Vitals History BMI and BSA Data Body Mass Index Body Surface Area  
 47.65 kg/m 2 2.16 m 2 Preferred Pharmacy Pharmacy Name Phone [facility], [facility] [phone] Your Updated Medication List  
  
   
This list is accurate as of 8/28/18 10:14 AM.  Always use your most recent med list.  
  
  
  
  
 Comp. Stocking,Knee,Regular,Lrg Misc  
20-30mmHG  
  
 diclofenac potassium 50 mg tablet Commonly known as:  CATAFLAM  
Take 1 Tab by mouth daily as needed. Indications: OSTEOARTHRITIS  
  
 furosemide 40 mg tablet Commonly known as:  LASIX Take 1 Tab by mouth daily as needed. losartan-hydroCHLOROthiazide 50-12.5 mg per tablet Commonly known as:  HYZAAR Take 1 Tab by mouth daily. multivitamin tablet Commonly known as:  ONE A DAY Take 1 tablet by mouth daily. YUVAFEM 10 mcg Tab vaginal tablet Generic drug:  estradiol Prescriptions Sent to Pharmacy Refills  
 losartan-hydroCHLOROthiazide (HYZAAR) 50-12.5 mg per tablet 1 Sig: Take 1 Tab by mouth daily. Class: Normal  
 Pharmacy: [facility] , [address] [place] [address] #: [phone] Route: Oral  
  
We Performed the Following CBC WITH AUTOMATED DIFF [18173 CPT(R)] HEMOGLOBIN A1C WITH EAG [20825 CPT(R)] METABOLIC PANEL, COMPREHENSIVE [32990 CPT(R)] Follow-up Instructions Return in about 3 months (around 11/28/2018) for HTN. To-Do List   
 08/28/2018 Imaging:  XR CHEST PA LAT   
  
 10/02/2018 8:30 AM  
  Appointment with Maikel Nguyen at [facility] ([phone]) Patient Instructions Remember to schedule cologuard Monitor your Blood pressure at home 3 times a week 
 goal 130/80 Introducing \[facility] [place] [facility]\"" & HEALTH SERVICES! Dear Mumtaz Dhaliwal: Thank you for requesting a [employer] account. Our records indicate that you already have an active [employer] account. You can access your account anytime at [email or web address] Did you know that you can access your hospital and ER discharge instructions at any time in [employer]?   You can also review all of your test results from your hospital stay or ER visit. Additional Information If you have questions, please visit the Frequently Asked Questions section of the [employer] website at [email or web address] Remember, [employer] is NOT to be used for urgent needs. For medical emergencies, dial 911. Now available from your iPhone and Android! Please provide this summary of care documentation to your next provider. Your primary care clinician is listed as RAVEN Mota. If you have any questions after today's visit, please call [phone].

## 2018-08-29 LAB
ALBUMIN SERPL-MCNC: 4.2 G/DL (ref 3.6–4.8)
ALBUMIN/GLOB SERPL: 1.6 {RATIO} (ref 1.2–2.2)
ALP SERPL-CCNC: 89 IU/L (ref 39–117)
ALT SERPL-CCNC: 17 IU/L (ref 0–32)
AST SERPL-CCNC: 22 IU/L (ref 0–40)
BASOPHILS # BLD AUTO: 0 X10E3/UL (ref 0–0.2)
BASOPHILS NFR BLD AUTO: 0 %
BILIRUB SERPL-MCNC: 0.3 MG/DL (ref 0–1.2)
BUN SERPL-MCNC: 15 MG/DL (ref 8–27)
BUN/CREAT SERPL: 17 (ref 12–28)
CALCIUM SERPL-MCNC: 9.7 MG/DL (ref 8.7–10.3)
CHLORIDE SERPL-SCNC: 99 MMOL/L (ref 96–106)
CO2 SERPL-SCNC: 30 MMOL/L (ref 20–29)
CREAT SERPL-MCNC: 0.87 MG/DL (ref 0.57–1)
EOSINOPHIL # BLD AUTO: 0.2 X10E3/UL (ref 0–0.4)
EOSINOPHIL NFR BLD AUTO: 2 %
ERYTHROCYTE [DISTWIDTH] IN BLOOD BY AUTOMATED COUNT: 14.2 % (ref 12.3–15.4)
EST. AVERAGE GLUCOSE BLD GHB EST-MCNC: 108 MG/DL
GLOBULIN SER CALC-MCNC: 2.7 G/DL (ref 1.5–4.5)
GLUCOSE SERPL-MCNC: 98 MG/DL (ref 65–99)
HBA1C MFR BLD: 5.4 % (ref 4.8–5.6)
HCT VFR BLD AUTO: 42.1 % (ref 34–46.6)
HGB BLD-MCNC: 13.7 G/DL (ref 11.1–15.9)
IMM GRANULOCYTES # BLD: 0 X10E3/UL (ref 0–0.1)
IMM GRANULOCYTES NFR BLD: 0 %
LYMPHOCYTES # BLD AUTO: 2.2 X10E3/UL (ref 0.7–3.1)
LYMPHOCYTES NFR BLD AUTO: 29 %
MCH RBC QN AUTO: 27.9 PG (ref 26.6–33)
MCHC RBC AUTO-ENTMCNC: 32.5 G/DL (ref 31.5–35.7)
MCV RBC AUTO: 86 FL (ref 79–97)
MONOCYTES # BLD AUTO: 0.3 X10E3/UL (ref 0.1–0.9)
MONOCYTES NFR BLD AUTO: 4 %
NEUTROPHILS # BLD AUTO: 4.9 X10E3/UL (ref 1.4–7)
NEUTROPHILS NFR BLD AUTO: 65 %
PLATELET # BLD AUTO: 356 X10E3/UL (ref 150–379)
POTASSIUM SERPL-SCNC: 3.9 MMOL/L (ref 3.5–5.2)
PROT SERPL-MCNC: 6.9 G/DL (ref 6–8.5)
RBC # BLD AUTO: 4.91 X10E6/UL (ref 3.77–5.28)
SODIUM SERPL-SCNC: 143 MMOL/L (ref 134–144)
WBC # BLD AUTO: 7.6 X10E3/UL (ref 3.4–10.8)

## 2018-09-18 ENCOUNTER — OFFICE VISIT (OUTPATIENT)
Dept: INTERNAL MEDICINE CLINIC | Age: 63
End: 2018-09-18

## 2018-09-18 VITALS
SYSTOLIC BLOOD PRESSURE: 160 MMHG | OXYGEN SATURATION: 99 % | BODY MASS INDEX: 46.92 KG/M2 | HEART RATE: 90 BPM | HEIGHT: 60 IN | WEIGHT: 239 LBS | TEMPERATURE: 97.8 F | RESPIRATION RATE: 18 BRPM | DIASTOLIC BLOOD PRESSURE: 78 MMHG

## 2018-09-18 DIAGNOSIS — Z87.19 HX OF HERNIA REPAIR: ICD-10-CM

## 2018-09-18 DIAGNOSIS — R11.0 NAUSEA: ICD-10-CM

## 2018-09-18 DIAGNOSIS — Z98.890 HX OF HERNIA REPAIR: ICD-10-CM

## 2018-09-18 DIAGNOSIS — R10.13 EPIGASTRIC PAIN: Primary | ICD-10-CM

## 2018-09-18 RX ORDER — DOXYCYCLINE HYCLATE 100 MG
TABLET ORAL
COMMUNITY
End: 2018-09-24

## 2018-09-18 RX ORDER — BENZONATATE 200 MG/1
CAPSULE ORAL
COMMUNITY
Start: 2018-09-11 | End: 2018-09-24

## 2018-09-18 RX ORDER — PREDNISONE 20 MG/1
TABLET ORAL
COMMUNITY
Start: 2018-09-11 | End: 2018-09-24

## 2018-09-18 NOTE — PROGRESS NOTES
CC: Hernia (Non Specific) (pain in stomach)  acute visit     HPI:    She is a 58 y.o. female who presents for evaluation of epigastric abdominal pain. Patient reports history of hiatal hernia repair and had Mesh put in - at [facility]. Patient reports one week of pain. Pain comes and goes, at times triggered by drinking, \" I feel like I have a knot\"     Pain at times interfering with sleep  Onset 2 weeks    Feels like intensity is increasing daily     Reports nausea, no vomiting     Reports that the physician who did her surgery is in nursing home    Currently reports normal BMs    ROS:  10 systems reviewed and negative other than HPI     Past Medical History:   Diagnosis Date    HTN (hypertension) 1/15/2010    Sleep apnea 1/20/2015    Sleep apnea, obstructive 1/5/2016       Current Outpatient Prescriptions on File Prior to Visit   Medication Sig Dispense Refill    losartan-hydroCHLOROthiazide (HYZAAR) 50-12.5 mg per tablet Take 1 Tab by mouth daily. 90 Tab 1    diclofenac potassium (CATAFLAM) 50 mg tablet Take 1 Tab by mouth daily as needed. Indications: OSTEOARTHRITIS 90 Tab 1    YUVAFEM 10 mcg tab vaginal tablet       furosemide (LASIX) 40 mg tablet Take 1 Tab by mouth daily as needed. 30 Tab 3    Comp. Stocking,Knee,Regular,Lrg misc 20-30mmHG 1 Units 2    multivitamin (ONE A DAY) tablet Take 1 tablet by mouth daily. No current facility-administered medications on file prior to visit.         Past Surgical History:   Procedure Laterality Date    HX APPENDECTOMY      HX HYSTERECTOMY  approx 1987 2/2 uterine fibroids    HX OTHER SURGICAL  2002    hiatal hernia repair        Family History   Problem Relation Age of Onset    Cancer Mother     Hypertension Father     Cancer Father     Hypertension Sister     Hypertension Brother      Reviewed and no changes     Social History     Social History    Marital status: SINGLE     Spouse name: N/A    Number of children: N/A    Years of education: N/A     Occupational History    Not on file.      Social History Main Topics    Smoking status: Never Smoker    Smokeless tobacco: Never Used    Alcohol use 2.5 oz/week     5 Glasses of wine per week      Comment: 1 glass per night    Drug use: No    Sexual activity: Yes     Partners: Male     Birth control/ protection: Condom     Other Topics Concern    Not on file     Social History Narrative            Visit Vitals    /78 (BP 1 Location: Right arm, BP Patient Position: Sitting)    Pulse 90    Temp 97.8 °F (36.6 °C) (Oral)    Resp 18    Ht 5' (1.524 m)    Wt 239 lb (108.4 kg)    SpO2 99%    BMI 46.68 kg/m2       Physical Examination:   General - Well appearing female, obese  HEENT - PERRL, TM no erythema/opacification, normal nasal turbinates, oropharynx no erythema or exudate, MMM  Neck - supple, no bruits, no TMG, no LAD  Pulm - clear to auscultation bilaterally  Cardio - RRR, normal S1 S2, no murmur gallops or rubs  Abd - soft, + epigastric tenderness to palpation , no masses, no HSM  Extrem - no edema, +2 distal pulses  Psych - normal affect, appropriate mood    Lab Results   Component Value Date/Time    WBC 7.6 08/28/2018 10:25 AM    HGB 13.7 08/28/2018 10:25 AM    HCT 42.1 08/28/2018 10:25 AM    PLATELET 423 21/43/6548 10:25 AM    MCV 86 08/28/2018 10:25 AM     Lab Results   Component Value Date/Time    Sodium 143 08/28/2018 10:25 AM    Potassium 3.9 08/28/2018 10:25 AM    Chloride 99 08/28/2018 10:25 AM    CO2 30 (H) 08/28/2018 10:25 AM    Anion gap 11 09/08/2010 09:44 AM    Glucose 98 08/28/2018 10:25 AM    BUN 15 08/28/2018 10:25 AM    Creatinine 0.87 08/28/2018 10:25 AM    BUN/Creatinine ratio 17 08/28/2018 10:25 AM    GFR est AA 81 08/28/2018 10:25 AM    GFR est non-AA 71 08/28/2018 10:25 AM    Calcium 9.7 08/28/2018 10:25 AM     Lab Results   Component Value Date/Time    Cholesterol, total 215 (H) 03/26/2018 10:59 AM    HDL Cholesterol 56 03/26/2018 10:59 AM    LDL, calculated 133 (H) 03/26/2018 10:59 AM    VLDL, calculated 26 03/26/2018 10:59 AM    Triglyceride 129 03/26/2018 10:59 AM    CHOL/HDL Ratio 4.0 09/08/2010 09:44 AM     Lab Results   Component Value Date/Time    TSH 2.200 03/26/2018 10:59 AM     No results found for: PSA, PSA2, PSAR1, [place], PSAR3, [id number], [id number], PSALT  Lab Results   Component Value Date/Time    Hemoglobin A1c 5.4 08/28/2018 10:25 AM     Lab Results   Component Value Date/Time    VITAMIN D, 25-HYDROXY 25.0 (L) 03/26/2018 10:59 AM       Lab Results   Component Value Date/Time    ALT (SGPT) 17 08/28/2018 10:25 AM    AST (SGOT) 22 08/28/2018 10:25 AM    Alk. phosphatase 89 08/28/2018 10:25 AM    Bilirubin, total 0.3 08/28/2018 10:25 AM           Assessment/Plan:    1. Epigastric pain    2. Nausea    3. Hx of hernia repair with MESH 15 years ago  Differential recurrence of hernia, gallstones, gastritis, pancreatitis  - [facility] General Surgery ref ED [facility]  - METABOLIC PANEL, COMPREHENSIVE  - CBC WITH AUTOMATED DIFF  - LIPASE  - US ABD LTD; Future    4.  Hypertension: blood pressure has been elevated consecutively ( past 3 visit) - patient is currently on losartan 50mg and HCTZ 12.5   - recommend increasing BP medication patient refuses to change dose/ discussed risks/complications of uncontrolled BP  \" I am going to loose weight at a spa in 2 weeks\"       Follow-up Disposition: Not on True Guthrie MD

## 2018-09-18 NOTE — PROGRESS NOTES
Reviewed record in preparation for visit and have obtained necessary documentation. Identified pt with two pt identifiers(name and ). Chief Complaint   Patient presents with    Hernia (Non Specific)     pain in stomach       Health Maintenance Due   Topic Date Due    DTaP/Tdap/Td  (1 - Tdap) 1975    Stool testing FIT-DNA  2004    Flu Vaccine  2018    Breast Cancer Screening  2018       Ms. Edwards has a reminder for a \"due or due soon\" health maintenance. I have asked that she discuss this further with her primary care provider for follow-up on this health maintenance. Coordination of Care Questionnaire:  :     1) Have you been to an emergency room, urgent care clinic since your last visit? no   Hospitalized since your last visit? no             2) Have you seen or consulted any other health care providers outside of [address] [place] [address] since your last visit? no  (Include any pap smears or colon screenings in this section.)    3) In the event something were to happen to you and you were unable to speak on your behalf, do you have an Advance Directive/ Living Will in place stating your wishes? NO    Do you have an Advance Directive on file? no    4) Are you interested in receiving information on Advance Directives? NO    Patient is accompanied by self I have received verbal consent from Vanita Mercedes to discuss any/all medical information while they are present in the room.

## 2018-09-18 NOTE — MR AVS SNAPSHOT
[address] [employer] 
[phone] Patient: Saintclair Blackbird MRN:  [id number] Visit Information Date & Time Provider Department Dept. Phone Encounter #  
 9/18/2018  8:15 AM Glenna Gresham, [address] [place] [address],[address] [phone] [id number] Your Appointments 11/27/2018  8:30 AM  
ROUTINE CARE with Jo Mckeon MD  
[facility]: 3 month follow up  
 [facility] [address] [address]  
[address] [place] [address] Upcoming Health Maintenance Date Due DTaP/Tdap/Td series (1 - Tdap) 7/13/1975 Cologuard Q 3 Years 7/13/2004 Influenza Age 5 to Adult 8/1/2018 BREAST CANCER SCRN MAMMOGRAM 9/13/2018 PAP AKA CERVICAL CYTOLOGY 10/2/2020 Allergies as of 9/18/2018  Review Complete On: 9/18/2018 By: Glenna Gresham MD  
 No Known Allergies Current Immunizations  Reviewed on 6/5/2015 Name Date  
 TB Skin Test (PPD) Intradermal 4/9/2013 Zoster Vaccine, Live 6/5/2015  9:08 AM  
  
 Not reviewed this visit You Were Diagnosed With   
  
 Codes Comments Epigastric pain    -  Primary ICD-10-CM: R10.13 ICD-9-CM: 789.06 Nausea     ICD-10-CM: R11.0 ICD-9-CM: 787.02 Hx of hernia repair     ICD-10-CM: Z98.890, Z87.19 ICD-9-CM: V45.89 Vitals BP Pulse Temp Resp Height(growth percentile) Weight(growth percentile) 160/78 (BP 1 Location: Right arm, BP Patient Position: Sitting) 90 97.8 °F (36.6 °C) (Oral) 18 5' (1.524 m) 239 lb (108.4 kg) SpO2 BMI OB Status Smoking Status 99% 46.68 kg/m2 Hysterectomy Never Smoker Vitals History BMI and BSA Data Body Mass Index Body Surface Area  
 46.68 kg/m 2 2.14 m 2 Preferred Pharmacy Pharmacy Name Phone [facility]/PHARMACY #[id number] [facility] [phone] Your Updated Medication List  
  
   
This list is accurate as of 9/18/18  9:04 AM.  Always use your most recent med list.  
  
  
  
  
 benzonatate 200 mg capsule Commonly known as:  TESSALON Comp. Stocking,Knee,Regular,Lrg Misc  
20-30mmHG  
  
 diclofenac potassium 50 mg tablet Commonly known as:  CATAFLAM  
Take 1 Tab by mouth daily as needed. Indications: OSTEOARTHRITIS  
  
 doxycycline 100 mg tablet Commonly known as:  VIBRA-TABS Take 1 tablet twice a day by oral route for 10 days. furosemide 40 mg tablet Commonly known as:  LASIX Take 1 Tab by mouth daily as needed. losartan-hydroCHLOROthiazide 50-12.5 mg per tablet Commonly known as:  HYZAAR Take 1 Tab by mouth daily. multivitamin tablet Commonly known as:  ONE A DAY Take 1 tablet by mouth daily. predniSONE 20 mg tablet Commonly known as:  DELTASONE  
  
 YUVAFEM 10 mcg Tab vaginal tablet Generic drug:  estradiol We Performed the Following CBC WITH AUTOMATED DIFF [76841 CPT(R)] LIPASE [id number] CPT(R)] METABOLIC PANEL, COMPREHENSIVE [64818 CPT(R)] REFERRAL TO GENERAL SURGERY [REF27 Custom] Comments:  
 Epigastric pain - history of hiatal hernia repair To-Do List   
 09/18/2018 Imaging:  US ABD LTD   
  
 10/02/2018 8:30 AM  
  [facility] ([phone][facility] Referral Information Referral ID Referred By Referred To  
  
 [id number] APPAtif Ponce MD   
   [address] [place] [address] Phone: [phone] Fax: [phone] Visits Status Start Date End Date 1 New Request 9/18/18 9/18/19 If your referral has a status of pending review or denied, additional information will be sent to support the outcome of this decision. Patient Instructions Make appointment with surgeon Schedule US of gallbladder We are doing labs today Introducing [facility] & HEALTH SERVICES! Dear Mumtaz Dhaliwal: Thank you for requesting a [employer] account. Our records indicate that you already have an active [employer] account. You can access your account anytime at [email or web address] Did you know that you can access your hospital and ER discharge instructions at any time in [employer]? You can also review all of your test results from your hospital stay or ER visit. Additional Information If you have questions, please visit the Frequently Asked Questions section of the [employer] website at [email or web address] Remember, [employer] is NOT to be used for urgent needs. For medical emergencies, dial 911. Now available from your iPhone and Android! Please provide this summary of care documentation to your next provider. Your primary care clinician is listed as RAVEN Mota. If you have any questions after today's visit, please call [phone].

## 2018-09-19 LAB
ALBUMIN SERPL-MCNC: 3.9 G/DL (ref 3.6–4.8)
ALBUMIN/GLOB SERPL: 1.4 {RATIO} (ref 1.2–2.2)
ALP SERPL-CCNC: 83 IU/L (ref 39–117)
ALT SERPL-CCNC: 14 IU/L (ref 0–32)
AST SERPL-CCNC: 19 IU/L (ref 0–40)
BASOPHILS # BLD AUTO: 0 X10E3/UL (ref 0–0.2)
BASOPHILS NFR BLD AUTO: 0 %
BILIRUB SERPL-MCNC: 0.4 MG/DL (ref 0–1.2)
BUN SERPL-MCNC: 17 MG/DL (ref 8–27)
BUN/CREAT SERPL: 17 (ref 12–28)
CALCIUM SERPL-MCNC: 9.5 MG/DL (ref 8.7–10.3)
CHLORIDE SERPL-SCNC: 98 MMOL/L (ref 96–106)
CO2 SERPL-SCNC: 31 MMOL/L (ref 20–29)
CREAT SERPL-MCNC: 1.02 MG/DL (ref 0.57–1)
EOSINOPHIL # BLD AUTO: 0 X10E3/UL (ref 0–0.4)
EOSINOPHIL NFR BLD AUTO: 0 %
ERYTHROCYTE [DISTWIDTH] IN BLOOD BY AUTOMATED COUNT: 14.5 % (ref 12.3–15.4)
GLOBULIN SER CALC-MCNC: 2.8 G/DL (ref 1.5–4.5)
GLUCOSE SERPL-MCNC: 67 MG/DL (ref 65–99)
HCT VFR BLD AUTO: 42.4 % (ref 34–46.6)
HGB BLD-MCNC: 14 G/DL (ref 11.1–15.9)
IMM GRANULOCYTES # BLD: 0 X10E3/UL (ref 0–0.1)
IMM GRANULOCYTES NFR BLD: 0 %
LIPASE SERPL-CCNC: 13 U/L (ref 14–72)
LYMPHOCYTES # BLD AUTO: 3.3 X10E3/UL (ref 0.7–3.1)
LYMPHOCYTES NFR BLD AUTO: 25 %
MCH RBC QN AUTO: 28 PG (ref 26.6–33)
MCHC RBC AUTO-ENTMCNC: 33 G/DL (ref 31.5–35.7)
MCV RBC AUTO: 85 FL (ref 79–97)
MONOCYTES # BLD AUTO: 0.7 X10E3/UL (ref 0.1–0.9)
MONOCYTES NFR BLD AUTO: 5 %
NEUTROPHILS # BLD AUTO: 9 X10E3/UL (ref 1.4–7)
NEUTROPHILS NFR BLD AUTO: 70 %
PLATELET # BLD AUTO: 341 X10E3/UL (ref 150–379)
POTASSIUM SERPL-SCNC: 3.7 MMOL/L (ref 3.5–5.2)
PROT SERPL-MCNC: 6.7 G/DL (ref 6–8.5)
RBC # BLD AUTO: 5 X10E6/UL (ref 3.77–5.28)
SODIUM SERPL-SCNC: 143 MMOL/L (ref 134–144)
WBC # BLD AUTO: 13.1 X10E3/UL (ref 3.4–10.8)

## 2018-09-20 NOTE — PROGRESS NOTES
WBC is elevated suggesting possibly underlying infection - we should do a CAT scan of the abdomen - if patient agrees let me know and I will order a CAT scan of abdomen and pelvis to be done ASAP

## 2018-09-21 ENCOUNTER — HOSPITAL ENCOUNTER (OUTPATIENT)
Dept: ULTRASOUND IMAGING | Age: 63
Discharge: HOME | End: 2018-09-21
Attending: INTERNAL MEDICINE
Payer: COMMERCIAL

## 2018-09-21 DIAGNOSIS — R11.0 NAUSEA: ICD-10-CM

## 2018-09-21 DIAGNOSIS — R10.13 EPIGASTRIC PAIN: ICD-10-CM

## 2018-09-21 PROCEDURE — 76705 ECHO EXAM OF ABDOMEN: CPT

## 2018-09-24 ENCOUNTER — OFFICE VISIT (OUTPATIENT)
Dept: SURGERY | Age: 63
End: 2018-09-24

## 2018-09-24 VITALS
OXYGEN SATURATION: 98 % | RESPIRATION RATE: 20 BRPM | DIASTOLIC BLOOD PRESSURE: 86 MMHG | TEMPERATURE: 97.6 F | HEIGHT: 60 IN | SYSTOLIC BLOOD PRESSURE: 194 MMHG | BODY MASS INDEX: 46.92 KG/M2 | WEIGHT: 239 LBS | HEART RATE: 97 BPM

## 2018-09-24 DIAGNOSIS — R10.10 PAIN OF UPPER ABDOMEN: Primary | ICD-10-CM

## 2018-09-24 DIAGNOSIS — E66.01 MORBID OBESITY WITH BODY MASS INDEX OF 45.0-49.9 IN ADULT (HCC): ICD-10-CM

## 2018-09-24 NOTE — PROGRESS NOTES
HISTORY OF PRESENT ILLNESS  Teresa Keith is a 58 y.o. female. HPI Comments:   CT from 2014 shows mesh and metal tacks at the umbilicus. No sign of any gastric surgery. No hiatal hernia  Recent US normal  Had surgery at [address] [place] [address] in 2005 with Dr. Demetria Aguirre for a ventral hernia repair with DualMesh and metal tacks. Epigastric pain with nausea worse for two weeks  No vomiting  BM ok    Appetite ok    Recalls having a colonoscopy 10 years   Never had an EGD    Chronic diclofenac    Wt unchanged        ____________________________________________________________________________  Patient presents with:  Epigastric Pain: Pt seen @ the request of Snehal Tim to evaluate  possible Hiatal hernia. S/P Hiatal hernia repair in 2001or 2005 @ [facility]    /86 (BP 1 Location: Left arm, BP Patient Position: Sitting)  Pulse 97  Temp 97.6 °F (36.4 °C) (Oral)   Resp 20  Ht 5' (1.524 m)  Wt 108.4 kg (239 lb)  SpO2 98%  BMI 46.68 kg/m2  Past Medical History:  1/15/2010: HTN (hypertension)  1/20/2015: Sleep apnea  1/5/2016: Sleep apnea, obstructive  Past Surgical History:  No date: HX APPENDECTOMY  approx 1987: HX HYSTERECTOMY      Comment: 2/2 uterine fibroids  2002: HX OTHER SURGICAL      Comment: hiatal hernia repair   Social History    Marital status: SINGLE              Spouse name:                       Years of education:                 Number of children:               Social History Main Topics    Smoking status: Never Smoker                                                                Smokeless status: Never Used                        Alcohol use: Yes           2.5 oz/week       5 Glasses of wine per week       Comment: 1 glass per night    Drug use:  No              Sexual activity: Yes               Partners with: Male       Birth control/protection: Condom      Review of patient's family history indicates:    Cancer                         Mother                    Hypertension                   Father Cancer                         Father                    Hypertension                   Sister                    Hypertension                   Brother                   Current Outpatient Prescriptions:  losartan-hydroCHLOROthiazide (HYZAAR) 50-12.5 mg per tablet, Take 1 Tab by mouth daily. diclofenac potassium (CATAFLAM) 50 mg tablet, Take 1 Tab by mouth daily as needed. Indications: OSTEOARTHRITIS  YUVAFEM 10 mcg tab vaginal tablet,   furosemide (LASIX) 40 mg tablet, Take 1 Tab by mouth daily as needed. multivitamin (ONE A DAY) tablet, Take 1 tablet by mouth daily. doxycycline (VIBRA-TABS) 100 mg tablet, Take 1 tablet twice a day by oral route for 10 days. benzonatate (TESSALON) 200 mg capsule,   predniSONE (DELTASONE) 20 mg tablet,   Comp. Stocking,Knee,Regular,Lrg misc, 20-30mmHG    No current facility-administered medications for this visit. Allergies: No Known Allergies  _____________________________________________________________________________          Epigastric Pain    The history is provided by the patient. This is a new problem. The current episode started more than 1 week ago. The problem occurs daily. The problem has not changed since onset. The pain is moderate. Pertinent negatives include no fever, no hematuria, no headaches and no chest pain. Review of Systems   Constitutional: Negative for chills, fever and weight loss. HENT: Negative for ear pain. Eyes: Negative for pain. Respiratory: Negative for shortness of breath. Cardiovascular: Negative for chest pain. Gastrointestinal: Negative for blood in stool. Genitourinary: Negative for hematuria. Musculoskeletal: Negative for joint pain. Skin: Negative for rash. Neurological: Negative for dizziness, focal weakness, seizures and headaches. Endo/Heme/Allergies: Does not bruise/bleed easily. Psychiatric/Behavioral: The patient does not have insomnia.         Physical Exam   Constitutional: She is oriented to person, place, and time. She appears well-developed and well-nourished. No distress. HENT:   Head: Normocephalic and atraumatic. Mouth/Throat: No oropharyngeal exudate. Eyes: Pupils are equal, round, and reactive to light. Neck: Normal range of motion. No tracheal deviation present. Cardiovascular: Normal rate, regular rhythm and normal heart sounds. No murmur heard. Pulmonary/Chest: Effort normal and breath sounds normal. No respiratory distress. She has no wheezes. Abdominal: Soft. Bowel sounds are normal. She exhibits no distension and no mass. There is tenderness in the epigastric area. There is no rebound and no guarding. No hernia. Musculoskeletal: Normal range of motion. She exhibits no edema or tenderness. Lymphadenopathy:     She has no cervical adenopathy. Neurological: She is alert and oriented to person, place, and time. Skin: Skin is warm. No rash noted. She is not diaphoretic. No erythema. Psychiatric: She has a normal mood and affect. Her behavior is normal.       ASSESSMENT and PLAN    ICD-10-CM ICD-9-CM    1. Pain of upper abdomen R10.10 789.09 REFERRAL TO GASTROENTEROLOGY   2.      Morbid obesity. BMI 46      I reviewed findings with Ms. Edwards. Her symptoms are focused in the epigastrium and that is where she is most tender. She has had previous ventral hernia repair at the umbilicus and while her exam is limited due to obesity, her repair is likely intact. We discussed the differential diagnosis of epigastric pain and burning including hiatal hernia, gastritis, peptic ulcer disease, etc. I have referred her to Dr. Sandro Greenwood to consider a GI workup. In the interim, I have also asked her to start Prilosec once daily and see if she has any improvement. If Dr. Sandro Greenwood does discover a hiatal hernia, I recommended she consider referral to [facility] for consideration of weight loss surgery. Further management to be based on these results.  She expressed an understanding of our discussion. All questions answered. She will call me with any change or worsening symptoms. Thank you for this consult.

## 2018-09-24 NOTE — MR AVS SNAPSHOT
[address], [address] [facility], [address] 
[phone] Patient: Gustavo Coto MRN:  [id number] Visit Information Date & Time Provider Department Dept. Phone Encounter #  
 9/24/2018  2:10 PM Meño Thapa MD [facility] [address] [id number] Your Appointments 11/27/2018  8:30 AM  
ROUTINE CARE with Jo Germain MD  
[facility] [address] [facility]) Appt Note: 3 month follow up  
 [address] [facility] [address] [address]  
[address] [facility] [address] Upcoming Health Maintenance Date Due DTaP/Tdap/Td series (1 - Tdap) 7/13/1975 Cologuard Q 3 Years 7/13/2004 Shingrix Vaccine Age 50> (1 of 2) 7/13/2004 BREAST CANCER SCRN MAMMOGRAM 9/13/2018 Influenza Age 5 to Adult 3/3/2019* PAP AKA CERVICAL CYTOLOGY 10/2/2020 *Topic was postponed. The date shown is not the original due date. Allergies as of 9/24/2018  Review Complete On: 9/24/2018 By: Luis Coto LPN No Known Allergies Current Immunizations  Reviewed on 6/5/2015 Name Date  
 TB Skin Test (PPD) Intradermal 4/9/2013 Zoster Vaccine, Live 6/5/2015  9:08 AM  
  
 Not reviewed this visit You Were Diagnosed With   
  
 Codes Comments Pain of upper abdomen    -  Primary ICD-10-CM: R10.10 ICD-9-CM: 789.09 Vitals BP Pulse Temp Resp Height(growth percentile) Weight(growth percentile) 194/86 (BP 1 Location: Left arm, BP Patient Position: Sitting) 97 97.6 °F (36.4 °C) (Oral) 20 5' (1.524 m) 239 lb (108.4 kg) SpO2 BMI OB Status Smoking Status 98% 46.68 kg/m2 Hysterectomy Never Smoker BMI and BSA Data Body Mass Index Body Surface Area  
 46.68 kg/m 2 2.14 m 2 Preferred Pharmacy Pharmacy Name Phone [facility]/PHARMACY #[id number] [facility] [phone] Your Updated Medication List  
  
   
This list is accurate as of 9/24/18  3:41 PM.  Always use your most recent med list.  
  
  
  
  
 Comp. Stocking,Knee,Regular,Lrg Misc  
20-30mmHG  
  
 diclofenac potassium 50 mg tablet Commonly known as:  CATAFLAM  
Take 1 Tab by mouth daily as needed. Indications: OSTEOARTHRITIS  
  
 furosemide 40 mg tablet Commonly known as:  LASIX Take 1 Tab by mouth daily as needed. losartan-hydroCHLOROthiazide 50-12.5 mg per tablet Commonly known as:  HYZAAR Take 1 Tab by mouth daily. multivitamin tablet Commonly known as:  ONE A DAY Take 1 tablet by mouth daily. YUVAFEM 10 mcg Tab vaginal tablet Generic drug:  estradiol We Performed the Following REFERRAL TO GASTROENTEROLOGY [MZR89 Custom] Comments:  
 Please evaluate patient for epigastric pain/colonoscopy. Appointment on 10/1/18 @ 2:30pm. To-Do List   
 10/02/2018 8:30 AM  
  Appointment with Jeremy Mercedes at . [facility]  ([phone]) Referral Information Referral ID Referred By Referred To  
  
 [id number] Radha VÁZQUEZ [address] [place], [address] [facility] Visits Status Start Date End Date 1 New Request 9/24/18 9/24/19 If your referral has a status of pending review or denied, additional information will be sent to support the outcome of this decision. Patient Instructions Appointment with Dr. Mark Mann on 10/1/18, arrival time 1:30pm, [address]. To evaluate epigastric pain/colonoscopy. Introducing [facility] & HEALTH SERVICES! Dear Swapnil Gonzales: Thank you for requesting a [employer] account. Our records indicate that you already have an active [employer] account. You can access your account anytime at [email or web address] Did you know that you can access your hospital and ER discharge instructions at any time in [employer]? You can also review all of your test results from your hospital stay or ER visit. Additional Information If you have questions, please visit the Frequently Asked Questions section of the [employer] website at [email or web address] Remember, [employer] is NOT to be used for urgent needs. For medical emergencies, dial 911. Now available from your iPhone and Android! Please provide this summary of care documentation to your next provider. Your primary care clinician is listed as RAVEN Mota. If you have any questions after today's visit, please call [phone].

## 2018-09-24 NOTE — PATIENT INSTRUCTIONS
Appointment with Dr. Sydnie Carson on 10/1/18, arrival time 1:30pm, [address]. To evaluate epigastric pain/colonoscopy.

## 2018-09-24 NOTE — PROGRESS NOTES
Chief Complaint   Patient presents with    Epigastric Pain     Pt seen @ the request of Leah Caputo to evaluate Hiatal hernia.  Hiatal Hernia     1. Have you been to the ER, urgent care clinic since your last visit? Hospitalized since your last visit?no    2. Have you seen or consulted any other health care providers outside of the [address] [place] [address] since your last visit? Include any pap smears or colon screening. No    Discussed advanced directive. Patient states that she does not have an advanced directive. Office notes faxed to Dr. Renetta Fish.

## 2018-09-25 NOTE — PROGRESS NOTES
Spoke with patient. Two pt identifiers confirmed. Patient notified per Dr. Saran Mario that we can hold off on the CT for now since her surgeon has ordered an upper GI. Pt verbalized understanding of information discussed w/ no further questions at this time.

## 2018-09-25 NOTE — PROGRESS NOTES
Spoke with patient. Two pt identifiers confirmed. Patient notified per Dr. Chelsi Ayoub that her recent lab results showed that her WBC is elevated suggesting possibly underlying infection. Patient advised that Dr. Chelsi Ayoub would like to order a CT of the abdomen and pelvis to be done ASAP. Patient states that she is agreeable to this, but that she was seen by the surgeon on 09/24/18 and the surgeon ordered an Upper GI to be done on 10/01/18. Patient states that she will do whatever the doctor thinks is necessary. Pt verbalized understanding of information discussed w/ no further questions at this time.

## 2018-09-28 PROBLEM — E66.01 MORBID OBESITY WITH BODY MASS INDEX OF 45.0-49.9 IN ADULT (HCC): Status: ACTIVE | Noted: 2018-09-28

## 2018-10-02 ENCOUNTER — HOSPITAL ENCOUNTER (OUTPATIENT)
Dept: NUTRITION | Age: 63
End: 2018-10-02

## 2018-11-27 ENCOUNTER — OFFICE VISIT (OUTPATIENT)
Dept: INTERNAL MEDICINE CLINIC | Age: 63
End: 2018-11-27

## 2018-11-27 VITALS
TEMPERATURE: 97.8 F | RESPIRATION RATE: 18 BRPM | SYSTOLIC BLOOD PRESSURE: 196 MMHG | HEART RATE: 75 BPM | WEIGHT: 245 LBS | BODY MASS INDEX: 48.1 KG/M2 | DIASTOLIC BLOOD PRESSURE: 85 MMHG | OXYGEN SATURATION: 99 % | HEIGHT: 60 IN

## 2018-11-27 DIAGNOSIS — R60.1 GENERALIZED EDEMA: ICD-10-CM

## 2018-11-27 DIAGNOSIS — I10 ESSENTIAL HYPERTENSION: Primary | ICD-10-CM

## 2018-11-27 DIAGNOSIS — R60.9 SWELLING: ICD-10-CM

## 2018-11-27 LAB
APPEARANCE UR: CLEAR
BILIRUB UR QL STRIP: NEGATIVE
COLOR UR: YELLOW
GLUCOSE UR QL: NEGATIVE
HGB UR QL STRIP: NEGATIVE
KETONES UR QL STRIP: NEGATIVE
LEUKOCYTE ESTERASE UR QL STRIP: NEGATIVE
MICRO URNS: NORMAL
NITRITE UR QL STRIP: NEGATIVE
PH UR STRIP: 7.5 [PH] (ref 5–7.5)
PROT UR QL STRIP: NEGATIVE
SP GR UR: 1.01 (ref 1–1.03)
UROBILINOGEN UR STRIP-MCNC: 0.2 MG/DL (ref 0.2–1)

## 2018-11-27 RX ORDER — HYDROCHLOROTHIAZIDE 25 MG/1
25 TABLET ORAL DAILY
Qty: 30 TAB | Refills: 5 | Status: SHIPPED | OUTPATIENT
Start: 2018-11-27 | End: 2018-12-07 | Stop reason: SDUPTHER

## 2018-11-27 RX ORDER — NEBIVOLOL 10 MG/1
10 TABLET ORAL DAILY
Qty: 30 TAB | Refills: 5 | Status: SHIPPED | OUTPATIENT
Start: 2018-11-27 | End: 2018-12-07 | Stop reason: SDUPTHER

## 2018-11-27 NOTE — PATIENT INSTRUCTIONS
STOP the current blood pressure medicine     Start the following pills : bystolic 29TH and Hydrochlorothiazide 25mg       Request mammogram copy from [facility] records of colonoscopy from Dr Vo Dys

## 2018-11-27 NOTE — PROGRESS NOTES
CC: Hypertension and Swelling      HPI:    She is a 58 y.o. female who presents for evaluation of HN and swelling     She has a long hx of HTN and on previous visits BP not at goal but patient did not want to increase losartan ( 50mg / HCT12.5mg) combination. She was working on lifestyle changes, however she gained 6 lbs during thanksgiving. Yesterday went for shots in knees and noted to have high BP and swelling in legs. Notes mild dyspnea  Denies CP     Patient is on chronic diclofenac for severe OA of knees    Abdominal pain resolved    In the interval she had both colonoscopy ( Dr Jeneen Duane)  and mammogram [facility])  ROS:  10 systems reviewed and negative other than HPI     Past Medical History:   Diagnosis Date    HTN (hypertension) 1/15/2010    Sleep apnea 1/20/2015    Sleep apnea, obstructive 1/5/2016       Current Outpatient Medications on File Prior to Visit   Medication Sig Dispense Refill    sodium hyaluronate (SUPARTZ FX/EUFLEXXA/HYALGAN) 10 mg/mL syrg injection       diclofenac potassium (CATAFLAM) 50 mg tablet Take 1 Tab by mouth daily as needed. Indications: OSTEOARTHRITIS 90 Tab 1    YUVAFEM 10 mcg tab vaginal tablet       furosemide (LASIX) 40 mg tablet Take 1 Tab by mouth daily as needed. 30 Tab 3    Comp. Stocking,Knee,Regular,Lrg misc 20-30mmHG 1 Units 2    multivitamin (ONE A DAY) tablet Take 1 tablet by mouth daily. No current facility-administered medications on file prior to visit.         Past Surgical History:   Procedure Laterality Date    HX APPENDECTOMY      HX HYSTERECTOMY  approx 1987    2/2 uterine fibroids    HX OTHER SURGICAL  2002    hiatal hernia repair        Family History   Problem Relation Age of Onset    Cancer Mother     Hypertension Father     Cancer Father     Hypertension Sister     Hypertension Brother      Reviewed and no changes     Social History     Socioeconomic History    Marital status: SINGLE     Spouse name: Not on file    Number of children: Not on file    Years of education: Not on file    Highest education level: Not on file   Social Needs    Financial resource strain: Not on file    Food insecurity - worry: Not on file    Food insecurity - inability: Not on file    Transportation needs - medical: Not on file   [employer] needs - non-medical: Not on file   Occupational History    Not on file   Tobacco Use    Smoking status: Never Smoker    Smokeless tobacco: Never Used   Substance and Sexual Activity    Alcohol use:  Yes     Alcohol/week: 2.5 oz     Types: 5 Glasses of wine per week     Comment: 1 glass per night    Drug use: No    Sexual activity: Yes     Partners: Male     Birth control/protection: Condom   Other Topics Concern    Not on file   Social History Narrative    Not on file            Visit Vitals  /85 (BP 1 Location: Right arm, BP Patient Position: Sitting)   Pulse 75   Temp 97.8 °F (36.6 °C) (Oral)   Resp 18   Ht 5' (1.524 m)   Wt 245 lb (111.1 kg)   SpO2 99%   BMI 47.85 kg/m²       Physical Examination:   General - Well appearing female  HEENT - PERRL, TM no erythema/opacification, normal nasal turbinates, oropharynx no erythema or exudate, MMM  Neck - supple, no bruits, no TMG, no LAD  Pulm - clear to auscultation bilaterally  Cardio - RRR, normal S1 S2, no murmur gallops or rubs  Abd - soft, nontender, no masses, no HSM  Extrem -2 + pitting edema, +2 distal pulses  Psych - normal affect, appropriate mood    Lab Results   Component Value Date/Time    WBC 13.1 (H) 09/18/2018 09:13 AM    HGB 14.0 09/18/2018 09:13 AM    HCT 42.4 09/18/2018 09:13 AM    PLATELET 009 66/68/3481 09:13 AM    MCV 85 09/18/2018 09:13 AM     Lab Results   Component Value Date/Time    Sodium 143 09/18/2018 09:13 AM    Potassium 3.7 09/18/2018 09:13 AM    Chloride 98 09/18/2018 09:13 AM    CO2 31 (H) 09/18/2018 09:13 AM    Anion gap 11 09/08/2010 09:44 AM    Glucose 67 09/18/2018 09:13 AM    BUN 17 09/18/2018 09:13 AM Creatinine 1.02 (H) 09/18/2018 09:13 AM    BUN/Creatinine ratio 17 09/18/2018 09:13 AM    GFR est AA 67 09/18/2018 09:13 AM    GFR est non-AA 58 (L) 09/18/2018 09:13 AM    Calcium 9.5 09/18/2018 09:13 AM     Lab Results   Component Value Date/Time    Cholesterol, total 215 (H) 03/26/2018 10:59 AM    HDL Cholesterol 56 03/26/2018 10:59 AM    LDL, calculated 133 (H) 03/26/2018 10:59 AM    VLDL, calculated 26 03/26/2018 10:59 AM    Triglyceride 129 03/26/2018 10:59 AM    CHOL/HDL Ratio 4.0 09/08/2010 09:44 AM     Lab Results   Component Value Date/Time    TSH 2.200 03/26/2018 10:59 AM     No results found for: PSA, PSA2, Ozie Ali, [id number], [id number], PSALT  Lab Results   Component Value Date/Time    Hemoglobin A1c 5.4 08/28/2018 10:25 AM     Lab Results   Component Value Date/Time    VITAMIN D, 25-HYDROXY 25.0 (L) 03/26/2018 10:59 AM       Lab Results   Component Value Date/Time    ALT (SGPT) 14 09/18/2018 09:13 AM    AST (SGOT) 19 09/18/2018 09:13 AM    Alk. phosphatase 83 09/18/2018 09:13 AM    Bilirubin, total 0.4 09/18/2018 09:13 AM     TTE in 2014 was normal        Assessment/Plan:      1. Essential hypertension - not well controlled/ malignant HTN   Stopped diovan, due to recall. Changed to losartan- 50mg - HCTZ  -and BP is not well controlled. I am concerned with concomitant use of NSAIDs leading to elevated BP. Will stop losartan. Start bystolic 77QV and increase HCTZ to 25mg. - return in 2 weeks for BP check  - recommend checking blood pressure with goal of less than  130/85 on average. Follow lo sodium diet. Given DASH diet guidelines. Recommend cardiovascular exercise regularly. Work on weight reduction. Call with blood pressure readings. 2. Morbid obesity   - has  obstructive sleep apnea, prediabetes, hypertension, severe OA of the knees. I do think patient will be a good candidate for bariatric surgery.  -Patient did not tolerate contrave.   Trial of Saxenda not covered by insurance  - reminded to schedule appointment with nutritionist - Abdullahi Miguel  - patient never scheduled appointment with Dr Jael Walton - encouraged to do so   - BMP and urinalysis today and CBC    3. Primary osteoarthritis of both knees  -Debilitating arthritis of both knees. Stopped pool exercises ,lost weight and gained it back unfortunately   - recent knee injections  - on diclofenac for pain ( advised to avoid daily use)    4. Pre-diabetes  -resolved on recent labs       5. Edema of both legs  -This is more pronounced today / checking CMP and urinalysis/ counseled on low salt diet  - normal  TTE in 2014    6. SUDEEP: on CPAP        Follow-up Disposition:  Return in about 2 weeks (around 12/11/2018) for blood pressure.     Satya Nunez MD

## 2018-11-27 NOTE — PROGRESS NOTES
Reviewed record in preparation for visit and have obtained necessary documentation. Identified pt with two pt identifiers(name and ). Chief Complaint   Patient presents with    Hypertension    Swelling       Health Maintenance Due   Topic Date Due    DTaP/Tdap/Td  (1 - Tdap) 1975    Stool testing FIT-DNA  2004    Shingles Vaccine (1 of 2) 2004    Breast Cancer Screening  2018       Ms. Edwards has a reminder for a \"due or due soon\" health maintenance. I have asked that she discuss this further with her primary care provider for follow-up on this health maintenance. Coordination of Care Questionnaire:  :     1) Have you been to an emergency room, urgent care clinic since your last visit? no   Hospitalized since your last visit? no             2) Have you seen or consulted any other health care providers outside of [address] [place] [address] since your last visit? no  (Include any pap smears or colon screenings in this section.)    3) In the event something were to happen to you and you were unable to speak on your behalf, do you have an Advance Directive/ Living Will in place stating your wishes? NO    Do you have an Advance Directive on file? no    4) Are you interested in receiving information on Advance Directives? NO    Patient is accompanied by self I have received verbal consent from Maureen Thompson to discuss any/all medical information while they are present in the room.

## 2018-11-28 LAB
ALBUMIN SERPL-MCNC: 4 G/DL (ref 3.6–4.8)
ALBUMIN/GLOB SERPL: 1.7 {RATIO} (ref 1.2–2.2)
ALP SERPL-CCNC: 88 IU/L (ref 39–117)
ALT SERPL-CCNC: 12 IU/L (ref 0–32)
AST SERPL-CCNC: 16 IU/L (ref 0–40)
BASOPHILS # BLD AUTO: 0 X10E3/UL (ref 0–0.2)
BASOPHILS NFR BLD AUTO: 0 %
BILIRUB SERPL-MCNC: 0.4 MG/DL (ref 0–1.2)
BUN SERPL-MCNC: 11 MG/DL (ref 8–27)
BUN/CREAT SERPL: 12 (ref 12–28)
CALCIUM SERPL-MCNC: 9.5 MG/DL (ref 8.7–10.3)
CHLORIDE SERPL-SCNC: 98 MMOL/L (ref 96–106)
CO2 SERPL-SCNC: 29 MMOL/L (ref 20–29)
CREAT SERPL-MCNC: 0.92 MG/DL (ref 0.57–1)
EOSINOPHIL # BLD AUTO: 0.1 X10E3/UL (ref 0–0.4)
EOSINOPHIL NFR BLD AUTO: 2 %
ERYTHROCYTE [DISTWIDTH] IN BLOOD BY AUTOMATED COUNT: 14.5 % (ref 12.3–15.4)
GLOBULIN SER CALC-MCNC: 2.3 G/DL (ref 1.5–4.5)
GLUCOSE SERPL-MCNC: 96 MG/DL (ref 65–99)
HCT VFR BLD AUTO: 40.5 % (ref 34–46.6)
HGB BLD-MCNC: 13.2 G/DL (ref 11.1–15.9)
IMM GRANULOCYTES # BLD: 0 X10E3/UL (ref 0–0.1)
IMM GRANULOCYTES NFR BLD: 0 %
LYMPHOCYTES # BLD AUTO: 2.1 X10E3/UL (ref 0.7–3.1)
LYMPHOCYTES NFR BLD AUTO: 27 %
MCH RBC QN AUTO: 27.6 PG (ref 26.6–33)
MCHC RBC AUTO-ENTMCNC: 32.6 G/DL (ref 31.5–35.7)
MCV RBC AUTO: 85 FL (ref 79–97)
MONOCYTES # BLD AUTO: 0.3 X10E3/UL (ref 0.1–0.9)
MONOCYTES NFR BLD AUTO: 4 %
NEUTROPHILS # BLD AUTO: 5.1 X10E3/UL (ref 1.4–7)
NEUTROPHILS NFR BLD AUTO: 67 %
PLATELET # BLD AUTO: 331 X10E3/UL (ref 150–379)
POTASSIUM SERPL-SCNC: 4.1 MMOL/L (ref 3.5–5.2)
PROT SERPL-MCNC: 6.3 G/DL (ref 6–8.5)
RBC # BLD AUTO: 4.78 X10E6/UL (ref 3.77–5.28)
SODIUM SERPL-SCNC: 143 MMOL/L (ref 134–144)
WBC # BLD AUTO: 7.6 X10E3/UL (ref 3.4–10.8)

## 2018-12-07 ENCOUNTER — CLINICAL SUPPORT (OUTPATIENT)
Dept: INTERNAL MEDICINE CLINIC | Age: 63
End: 2018-12-07

## 2018-12-07 VITALS
BODY MASS INDEX: 48.1 KG/M2 | HEART RATE: 56 BPM | RESPIRATION RATE: 18 BRPM | HEIGHT: 60 IN | WEIGHT: 245 LBS | OXYGEN SATURATION: 96 % | SYSTOLIC BLOOD PRESSURE: 170 MMHG | DIASTOLIC BLOOD PRESSURE: 73 MMHG

## 2018-12-07 DIAGNOSIS — I10 ESSENTIAL HYPERTENSION: Primary | ICD-10-CM

## 2018-12-07 DIAGNOSIS — R60.9 SWELLING: ICD-10-CM

## 2018-12-07 DIAGNOSIS — I10 ESSENTIAL HYPERTENSION: ICD-10-CM

## 2018-12-07 RX ORDER — HYDROCHLOROTHIAZIDE 25 MG/1
25 TABLET ORAL DAILY
Qty: 30 TAB | Refills: 5 | Status: SHIPPED | OUTPATIENT
Start: 2018-12-07 | End: 2019-01-17 | Stop reason: SDUPTHER

## 2018-12-07 RX ORDER — NEBIVOLOL 10 MG/1
10 TABLET ORAL DAILY
Qty: 30 TAB | Refills: 5 | Status: SHIPPED | OUTPATIENT
Start: 2018-12-07 | End: 2019-01-17 | Stop reason: SDUPTHER

## 2019-01-17 DIAGNOSIS — I10 ESSENTIAL HYPERTENSION: ICD-10-CM

## 2019-01-17 DIAGNOSIS — R60.9 SWELLING: ICD-10-CM

## 2019-01-17 RX ORDER — HYDROCHLOROTHIAZIDE 25 MG/1
25 TABLET ORAL DAILY
Qty: 30 TAB | Refills: 5 | Status: SHIPPED | OUTPATIENT
Start: 2019-01-17 | End: 2019-06-27 | Stop reason: SDUPTHER

## 2019-01-17 RX ORDER — NEBIVOLOL 10 MG/1
10 TABLET ORAL DAILY
Qty: 30 TAB | Refills: 5 | Status: SHIPPED | OUTPATIENT
Start: 2019-01-17 | End: 2019-06-27 | Stop reason: SDUPTHER

## 2019-01-17 NOTE — TELEPHONE ENCOUNTER
Pt requesting a refill of medications \"Hydrochlorothiazide\" 25 MG and \"Nebivolol\" 10 MG to be sent to [facility] mail order pharmacy. Requesting for a 90 day supply. These medications was going to [facility] pharmacy at first however pt insurance requires medications to be sent to [facility] with a 90 day supply. Pt stated, she has 10 days worth of medications left.    Best contact number [phone]       Message received & copied from [facility] after closing on 1/16/19

## 2019-03-06 DIAGNOSIS — M17.10 PRIMARY OSTEOARTHRITIS OF KNEE, UNSPECIFIED LATERALITY: ICD-10-CM

## 2019-03-07 RX ORDER — DICLOFENAC POTASSIUM 50 MG/1
50 TABLET, FILM COATED ORAL
Qty: 90 TAB | Refills: 1 | Status: SHIPPED | OUTPATIENT
Start: 2019-03-07 | End: 2019-04-01 | Stop reason: SDUPTHER

## 2019-03-28 ENCOUNTER — TELEPHONE (OUTPATIENT)
Dept: INTERNAL MEDICINE CLINIC | Age: 64
End: 2019-03-28

## 2019-03-28 NOTE — TELEPHONE ENCOUNTER
Pt stated her order on the script says process after 3/6/2020 and [facility] told her to contact the doctor for her \"Diclofenacpot Tabs\"       Message received & copied from [facility]

## 2019-03-29 NOTE — TELEPHONE ENCOUNTER
Spoke with patient. Two pt identifiers confirmed. Patient advised that the prescription that was sent to [facility] for her does not show a date of 03/06/2020. Patient states that they have never sent the diclofenac prescription to her home. Advised patient to call [facility] to see what the issue is with her prescription and give me a call if they need more information from us. Pt verbalized understanding of information discussed w/ no further questions at this time.

## 2019-04-01 ENCOUNTER — TELEPHONE (OUTPATIENT)
Dept: INTERNAL MEDICINE CLINIC | Age: 64
End: 2019-04-01

## 2019-04-01 RX ORDER — MELOXICAM 15 MG/1
15 TABLET ORAL
Qty: 90 TAB | Refills: 0 | Status: SHIPPED | OUTPATIENT
Start: 2019-04-01 | End: 2019-09-12 | Stop reason: SDUPTHER

## 2019-04-01 RX ORDER — MELOXICAM 15 MG/1
15 TABLET ORAL
Qty: 30 TAB | Refills: 1 | Status: SHIPPED | OUTPATIENT
Start: 2019-04-01 | End: 2019-04-01 | Stop reason: SDUPTHER

## 2019-04-01 NOTE — TELEPHONE ENCOUNTER
Prescription sent for mobic, patient should avoid taking it daily as it can cause stomach upset.    Always take it with food

## 2019-04-01 NOTE — TELEPHONE ENCOUNTER
Pt requesting an alternative to \"Diclofenac-POT\" tablets. [facility] is currently out of this particular medicine. Looking for an alternative.    Best contact number [phone]       Message received & copied from [facility]

## 2019-06-20 ENCOUNTER — TELEPHONE (OUTPATIENT)
Dept: INTERNAL MEDICINE CLINIC | Age: 64
End: 2019-06-20

## 2019-06-20 NOTE — TELEPHONE ENCOUNTER
Spoke with patient. Two pt identifiers confirmed. Patient offered an appointment with Dr. María Elena Solis on 06/27/19. Appointment accepted. Patient advised if anything changes or if unable to keep this appointment to call the office  Pt verbalized understanding of information discussed w/ no further questions at this time.

## 2019-06-20 NOTE — TELEPHONE ENCOUNTER
Pt states she needs to be seen for her htn medication /check and also sciatic pain. Pt needs to be seen sooner than August.  Please call to schedule.  #[phone]

## 2019-06-27 ENCOUNTER — OFFICE VISIT (OUTPATIENT)
Dept: INTERNAL MEDICINE CLINIC | Age: 64
End: 2019-06-27

## 2019-06-27 VITALS
DIASTOLIC BLOOD PRESSURE: 97 MMHG | TEMPERATURE: 98 F | HEART RATE: 64 BPM | BODY MASS INDEX: 45.35 KG/M2 | HEIGHT: 60 IN | WEIGHT: 231 LBS | RESPIRATION RATE: 18 BRPM | SYSTOLIC BLOOD PRESSURE: 155 MMHG | OXYGEN SATURATION: 97 %

## 2019-06-27 DIAGNOSIS — E66.01 MORBID OBESITY WITH BMI OF 40.0-44.9, ADULT (HCC): Primary | ICD-10-CM

## 2019-06-27 DIAGNOSIS — I10 ESSENTIAL HYPERTENSION: ICD-10-CM

## 2019-06-27 DIAGNOSIS — R60.9 SWELLING: ICD-10-CM

## 2019-06-27 RX ORDER — HYDROCHLOROTHIAZIDE 25 MG/1
25 TABLET ORAL DAILY
Qty: 90 TAB | Refills: 3 | Status: SHIPPED | OUTPATIENT
Start: 2019-06-27 | End: 2019-09-05 | Stop reason: ALTCHOICE

## 2019-06-27 RX ORDER — NEBIVOLOL 10 MG/1
10 TABLET ORAL DAILY
Qty: 90 TAB | Refills: 3 | Status: SHIPPED | OUTPATIENT
Start: 2019-06-27 | End: 2020-03-09 | Stop reason: DRUGHIGH

## 2019-06-27 NOTE — PROGRESS NOTES
Identified pt with two pt identifiers(name and ). Reviewed record in preparation for visit and have obtained necessary documentation. Chief Complaint   Patient presents with    Hypertension    Medication Refill     Pt is asking for 90 supply on medication      Visit Vitals  BP (!) 155/97 (BP 1 Location: Left arm, BP Patient Position: Sitting)   Pulse 64   Temp 98 °F (36.7 °C) (Oral)   Resp 18   Ht 5' (1.524 m)   Wt 231 lb (104.8 kg)   SpO2 97%   BMI 45.11 kg/m²     Health Maintenance Due   Topic    DTaP/Tdap/Td series (1 - Tdap)    Shingrix Vaccine Age 49> (1 of 2)    BREAST CANCER SCRN MAMMOGRAM     Bone Densitometry (Dexa) Screening        Coordination of Care Questionnaire:  :   1) Have you been to an emergency room, urgent care, or hospitalized since your last visit? If yes, where when, and reason for visit? No         2. Have seen or consulted any other health care provider since your last visit? If yes, where when, and reason for visit? No       3) Do you have an Advanced Directive/ Living Will in place? No  If yes, do we have a copy on file   If no, would you like information     Patient is accompanied by self I have received verbal consent from Servando Akhtar to discuss any/all medical information while they are present in the room.

## 2019-06-27 NOTE — PROGRESS NOTES
CC: Hypertension and Medication Refill (Pt is asking for 90 supply on medication)      HPI:    She is a 58 y.o. female who presents for evaluation of HTN and swelling     She has a long hx of HTN and on previous visits BP and currently on bystolic 10 and HCTZ 45NL reports missing medication and taking late this morning. BP is elevated today. Denies CP and dyspnea  Wearing CPAP regularly    Shots in right hand and knee shot today    Patient is on chronic diclofenac for severe OA of knees      Lost 14 lbs since December, working hard on healthy diet - congratulated patient    ROS:  10 systems reviewed and negative other than HPI     Past Medical History:   Diagnosis Date    HTN (hypertension) 1/15/2010    Sleep apnea 1/20/2015    Sleep apnea, obstructive 1/5/2016       Current Outpatient Medications on File Prior to Visit   Medication Sig Dispense Refill    meloxicam (MOBIC) 15 mg tablet Take 1 Tab by mouth daily as needed for Pain. 90 Tab 0    YUVAFEM 10 mcg tab vaginal tablet Insert 10 mcg into vagina.  Comp. Stocking,Knee,Regular,Lrg misc 20-30mmHG 1 Units 2    multivitamin (ONE A DAY) tablet Take 1 tablet by mouth daily.  sodium hyaluronate (SUPARTZ FX/EUFLEXXA/HYALGAN) 10 mg/mL syrg injection 20 mg by Intra artICUlar route as needed.  furosemide (LASIX) 40 mg tablet Take 1 Tab by mouth daily as needed. (Patient not taking: Reported on 6/27/2019) 30 Tab 3     No current facility-administered medications on file prior to visit.         Past Surgical History:   Procedure Laterality Date    HX APPENDECTOMY      HX HYSTERECTOMY  approx 1987    2/2 uterine fibroids    HX OTHER SURGICAL  2002    hiatal hernia repair        Family History   Problem Relation Age of Onset    Cancer Mother     Hypertension Father     Cancer Father     Hypertension Sister     Hypertension Brother      Reviewed and no changes     Social History     Socioeconomic History    Marital status: SINGLE     Spouse name: Not on file    Number of children: Not on file    Years of education: Not on file    Highest education level: Not on file   Occupational History    Not on file   Social Needs    Financial resource strain: Not on file    Food insecurity:     Worry: Not on file     Inability: Not on file    Transportation needs:     Medical: Not on file     Non-medical: Not on file   Tobacco Use    Smoking status: Never Smoker    Smokeless tobacco: Never Used   Substance and Sexual Activity    Alcohol use:  Yes     Alcohol/week: 2.5 oz     Types: 5 Glasses of wine per week     Comment: 1 glass per night    Drug use: No    Sexual activity: Yes     Partners: Male     Birth control/protection: Condom   Lifestyle    Physical activity:     Days per week: Not on file     Minutes per session: Not on file    Stress: Not on file   Relationships    Social connections:     Talks on phone: Not on file     Gets together: Not on file     Attends [employer] service: Not on file     Active member of club or organization: Not on file     Attends meetings of clubs or organizations: Not on file     Relationship status: Not on file    Intimate partner violence:     Fear of current or ex partner: Not on file     Emotionally abused: Not on file     Physically abused: Not on file     Forced sexual activity: Not on file   Other Topics Concern    Not on file   Social History Narrative    Not on file            Visit Vitals  BP (!) 155/97 (BP 1 Location: Left arm, BP Patient Position: Sitting)   Pulse 64   Temp 98 °F (36.7 °C) (Oral)   Resp 18   Ht 5' (1.524 m)   Wt 231 lb (104.8 kg)   SpO2 97%   BMI 45.11 kg/m²       Physical Examination:   General - Well appearing female  HEENT - PERRL, TM no erythema/opacification, normal nasal turbinates, oropharynx no erythema or exudate, MMM  Neck - supple, no bruits, no TMG, no LAD  Pulm - clear to auscultation bilaterally  Cardio - RRR, normal S1 S2, no murmur gallops or rubs  Abd - soft, nontender, no masses, no HSM  Extrem -1+ pitting edema, +2 distal pulses  Psych - normal affect, appropriate mood    Lab Results   Component Value Date/Time    WBC 7.6 11/27/2018 09:45 AM    HGB 13.2 11/27/2018 09:45 AM    HCT 40.5 11/27/2018 09:45 AM    PLATELET 749 02/73/2605 09:45 AM    MCV 85 11/27/2018 09:45 AM     Lab Results   Component Value Date/Time    Sodium 143 11/27/2018 09:45 AM    Potassium 4.1 11/27/2018 09:45 AM    Chloride 98 11/27/2018 09:45 AM    CO2 29 11/27/2018 09:45 AM    Anion gap 11 09/08/2010 09:44 AM    Glucose 96 11/27/2018 09:45 AM    BUN 11 11/27/2018 09:45 AM    Creatinine 0.92 11/27/2018 09:45 AM    BUN/Creatinine ratio 12 11/27/2018 09:45 AM    GFR est AA 76 11/27/2018 09:45 AM    GFR est non-AA 66 11/27/2018 09:45 AM    Calcium 9.5 11/27/2018 09:45 AM     Lab Results   Component Value Date/Time    Cholesterol, total 215 (H) 03/26/2018 10:59 AM    HDL Cholesterol 56 03/26/2018 10:59 AM    LDL, calculated 133 (H) 03/26/2018 10:59 AM    VLDL, calculated 26 03/26/2018 10:59 AM    Triglyceride 129 03/26/2018 10:59 AM    CHOL/HDL Ratio 4.0 09/08/2010 09:44 AM     Lab Results   Component Value Date/Time    TSH 2.200 03/26/2018 10:59 AM     No results found for: PSA, PSA2, PSAR1, Cresenciano Cords, [id number], [id number], PSALT  Lab Results   Component Value Date/Time    Hemoglobin A1c 5.4 08/28/2018 10:25 AM     Lab Results   Component Value Date/Time    VITAMIN D, 25-HYDROXY 25.0 (L) 03/26/2018 10:59 AM       Lab Results   Component Value Date/Time    ALT (SGPT) 12 11/27/2018 09:45 AM    AST (SGOT) 16 11/27/2018 09:45 AM    Alk. phosphatase 88 11/27/2018 09:45 AM    Bilirubin, total 0.4 11/27/2018 09:45 AM     TTE in 2014 was normal        Assessment/Plan:      1. Essential hypertension - not well controlled/ malignant HTN   Currently on bystolic and HCTZ, medications late this AM and had injections, suspect that is reason for elevation.   - return in 2 weeks for BP check  - recommend checking blood pressure with goal of less than  130/85 on average. Follow lo sodium diet. Given DASH diet guidelines. Recommend cardiovascular exercise regularly. Work on weight reduction. Call with blood pressure readings. 2. Morbid obesity   - has  obstructive sleep apnea, prediabetes, hypertension, severe OA of the knees. She lost 14 lbs congratulated patient    3. Primary osteoarthritis of both knees  -Debilitating arthritis of both knees. Stopped pool exercises ,lost weight   - recent knee injections  - on meloxicam 15mg PRN   - using CBD oil cream which is helping with pain    4. Pre-diabetes   -resolved on recent labs       5. Edema of both legs  -improved  - normal  TTE in 2014    6.  SUDEEP: on CPAP      Request records of mammogram Done at [facility] breast clinic        Callum Murray MD

## 2019-06-28 LAB
BUN SERPL-MCNC: 20 MG/DL (ref 8–27)
BUN/CREAT SERPL: 21 (ref 12–28)
CALCIUM SERPL-MCNC: 9.7 MG/DL (ref 8.7–10.3)
CHLORIDE SERPL-SCNC: 100 MMOL/L (ref 96–106)
CO2 SERPL-SCNC: 23 MMOL/L (ref 20–29)
CREAT SERPL-MCNC: 0.95 MG/DL (ref 0.57–1)
GLUCOSE SERPL-MCNC: 122 MG/DL (ref 65–99)
POTASSIUM SERPL-SCNC: 4.4 MMOL/L (ref 3.5–5.2)
SODIUM SERPL-SCNC: 142 MMOL/L (ref 134–144)

## 2019-07-11 ENCOUNTER — CLINICAL SUPPORT (OUTPATIENT)
Dept: INTERNAL MEDICINE CLINIC | Age: 64
End: 2019-07-11

## 2019-07-11 VITALS
SYSTOLIC BLOOD PRESSURE: 179 MMHG | WEIGHT: 231 LBS | DIASTOLIC BLOOD PRESSURE: 80 MMHG | BODY MASS INDEX: 45.35 KG/M2 | HEART RATE: 60 BPM | OXYGEN SATURATION: 98 % | RESPIRATION RATE: 16 BRPM | HEIGHT: 60 IN | TEMPERATURE: 97.8 F

## 2019-07-11 DIAGNOSIS — I10 ESSENTIAL HYPERTENSION: Primary | ICD-10-CM

## 2019-07-11 NOTE — PROGRESS NOTES
BLood pressure check is elevated. Patient declines change in medication. She states she is going on a trip and does not want to change medication.   I would like to change the HCTZ 25 to a combination pill with ace but she declines    She will follow up after her trip     She understands the risk of stroke and heart attack and possibly death with blood pressure that is not well controlled

## 2019-07-11 NOTE — PROGRESS NOTES
Patient is here today for her 1 week BP follow up nurse visit. Patient is taking her medication HCTZ daily. Patient has no complaints or side effects with the medication. Patient blood pressure today in the left arm  186/80, blood pressure in the right arm 179/80. Patient    was informed that she will need to see Dr. Shamir Mendez today. Patient refused to be seen today. Patient   is willing to return to see  after her vocation to [facility]. Patient agreed to continue her medication and    monitor her bp and keep a log of her readings to bring on her return visit.  was informed of patient bp and her decision to not be seen on today. Patient still   refuse after Dr. Mary Ellen Fernández request to see her. Patient verbalized understanding.

## 2019-09-05 ENCOUNTER — OFFICE VISIT (OUTPATIENT)
Dept: INTERNAL MEDICINE CLINIC | Age: 64
End: 2019-09-05

## 2019-09-05 VITALS
HEART RATE: 62 BPM | HEIGHT: 60 IN | OXYGEN SATURATION: 96 % | DIASTOLIC BLOOD PRESSURE: 71 MMHG | SYSTOLIC BLOOD PRESSURE: 153 MMHG | RESPIRATION RATE: 18 BRPM | WEIGHT: 233 LBS | TEMPERATURE: 97.9 F | BODY MASS INDEX: 45.75 KG/M2

## 2019-09-05 DIAGNOSIS — I10 ESSENTIAL HYPERTENSION: Primary | ICD-10-CM

## 2019-09-05 RX ORDER — CHLORTHALIDONE 25 MG/1
25 TABLET ORAL DAILY
Qty: 30 TAB | Refills: 1 | Status: SHIPPED | OUTPATIENT
Start: 2019-09-05 | End: 2019-09-29 | Stop reason: SDUPTHER

## 2019-09-05 NOTE — PROGRESS NOTES
Reviewed record in preparation for visit and have obtained necessary documentation. Identified pt with two pt identifiers(name and ). Chief Complaint   Patient presents with    Hypertension    Weight Management       Health Maintenance Due   Topic Date Due    DTaP/Tdap/Td  (1 - Tdap) 1975    Shingles Vaccine (1 of 2) 2004    Mammogram  2018    Glaucoma Screening   2019    Bone Mineral Density   2019    Pneumococcal Vaccine (1 of 2 - PCV13) 2019    Flu Vaccine  2019       Ms. Edwards has a reminder for a \"due or due soon\" health maintenance. I have asked that she discuss this further with her primary care provider for follow-up on this health maintenance. Coordination of Care Questionnaire:  :     1) Have you been to an emergency room, urgent care clinic since your last visit? no   Hospitalized since your last visit? no             2) Have you seen or consulted any other health care providers outside of [address] [place] [address] since your last visit? no  (Include any pap smears or colon screenings in this section.)    3) In the event something were to happen to you and you were unable to speak on your behalf, do you have an Advance Directive/ Living Will in place stating your wishes? NO    Do you have an Advance Directive on file? no    4) Are you interested in receiving information on Advance Directives? NO    Patient is accompanied by self I have received verbal consent from Maribell Chen to discuss any/all medical information while they are present in the room.

## 2019-09-05 NOTE — PROGRESS NOTES
CC: Hypertension and Weight Management      HPI:    She is a 71 y.o. female who presents for evaluation of blood pressure         BLood pressure elevated again, She is currently on bystolic 66YF and HCTZ 80NF. Denies dizziness. Generally feels well. She is struggling with weight loss. Reports walking and eating heallthy, veggies, meats, no sodas or juice. ROS:  Constitutional: negative for fevers, chills, anorexia and weight loss  Eyes:   negative for visual disturbance,  irritation  ENT:   negative for tinnitus,sore throat,nasal congestion,ear pain, sinus pain. Respiratory:  negative for cough, hemoptysis, dyspnea,wheezing  CV:   negative for chest pain, palpitations, lower extremity edema  GI:   negative for nausea, vomiting, diarrhea, abdominal pain,melena  Genitourinary: negative for frequency, dysuria, hematuria  Musculoskel: Chronic knee pain   Neurological:  negative for headaches, dizziness, focal weakness, numbness  Psych:             Negative for depression and anxiety    Past Medical History:   Diagnosis Date    HTN (hypertension) 1/15/2010    Sleep apnea 1/20/2015    Sleep apnea, obstructive 1/5/2016       Current Outpatient Medications on File Prior to Visit   Medication Sig Dispense Refill    nebivolol (BYSTOLIC) 10 mg tablet Take 1 Tab by mouth daily. 90 Tab 3    meloxicam (MOBIC) 15 mg tablet Take 1 Tab by mouth daily as needed for Pain. 90 Tab 0    sodium hyaluronate (SUPARTZ FX/EUFLEXXA/HYALGAN) 10 mg/mL syrg injection 20 mg by Intra artICUlar route as needed.  YUVAFEM 10 mcg tab vaginal tablet Insert 10 mcg into vagina.  Comp. Stocking,Knee,Regular,Lrg misc 20-30mmHG 1 Units 2    multivitamin (ONE A DAY) tablet Take 1 tablet by mouth daily.  furosemide (LASIX) 40 mg tablet Take 1 Tab by mouth daily as needed. 30 Tab 3     No current facility-administered medications on file prior to visit.         Past Surgical History:   Procedure Laterality Date    HX APPENDECTOMY  HX HYSTERECTOMY  approx 1987 2/2 uterine fibroids    HX OTHER SURGICAL  2002    hiatal hernia repair        Family History   Problem Relation Age of Onset    Cancer Mother     Hypertension Father     Cancer Father     Hypertension Sister     Hypertension Brother      Reviewed and no changes     Social History     Socioeconomic History    Marital status: SINGLE     Spouse name: Not on file    Number of children: Not on file    Years of education: Not on file    Highest education level: Not on file   Occupational History    Not on file   Social Needs    Financial resource strain: Not on file    Food insecurity:     Worry: Not on file     Inability: Not on file    Transportation needs:     Medical: Not on file     Non-medical: Not on file   Tobacco Use    Smoking status: Never Smoker    Smokeless tobacco: Never Used   Substance and Sexual Activity    Alcohol use:  Yes     Alcohol/week: 4.2 standard drinks     Types: 5 Glasses of wine per week     Comment: 1 glass per night    Drug use: No    Sexual activity: Yes     Partners: Male     Birth control/protection: Condom   Lifestyle    Physical activity:     Days per week: Not on file     Minutes per session: Not on file    Stress: Not on file   Relationships    Social connections:     Talks on phone: Not on file     Gets together: Not on file     Attends Cheondoism service: Not on file     Active member of club or organization: Not on file     Attends meetings of clubs or organizations: Not on file     Relationship status: Not on file    Intimate partner violence:     Fear of current or ex partner: Not on file     Emotionally abused: Not on file     Physically abused: Not on file     Forced sexual activity: Not on file   Other Topics Concern    Not on file   Social History Narrative    Not on file            Visit Vitals  /71   Pulse 62   Temp 97.9 °F (36.6 °C) (Oral)   Resp 18   Ht 5' (1.524 m)   Wt 233 lb (105.7 kg)   SpO2 96%   BMI 45.50 kg/m²       Physical Examination:   General - Well appearing female, obesity  HEENT - PERRL, TM no erythema/opacification, normal nasal turbinates, oropharynx no erythema or exudate, MMM  Neck - supple, no bruits, no TMG, no LAD  Pulm - clear to auscultation bilaterally  Cardio - RRR, normal S1 S2, no murmur gallops or rubs  Abd - soft, nontender, no masses, no HSM  Extrem - no edema, +2 distal pulses  Psych - normal affect, appropriate mood    Lab Results   Component Value Date/Time    WBC 7.6 11/27/2018 09:45 AM    HGB 13.2 11/27/2018 09:45 AM    HCT 40.5 11/27/2018 09:45 AM    PLATELET 874 47/25/1023 09:45 AM    MCV 85 11/27/2018 09:45 AM     Lab Results   Component Value Date/Time    Sodium 142 06/27/2019 02:02 PM    Potassium 4.4 06/27/2019 02:02 PM    Chloride 100 06/27/2019 02:02 PM    CO2 23 06/27/2019 02:02 PM    Anion gap 11 09/08/2010 09:44 AM    Glucose 122 (H) 06/27/2019 02:02 PM    BUN 20 06/27/2019 02:02 PM    Creatinine 0.95 06/27/2019 02:02 PM    BUN/Creatinine ratio 21 06/27/2019 02:02 PM    GFR est AA 73 06/27/2019 02:02 PM    GFR est non-AA 63 06/27/2019 02:02 PM    Calcium 9.7 06/27/2019 02:02 PM     Lab Results   Component Value Date/Time    Cholesterol, total 215 (H) 03/26/2018 10:59 AM    HDL Cholesterol 56 03/26/2018 10:59 AM    LDL, calculated 133 (H) 03/26/2018 10:59 AM    VLDL, calculated 26 03/26/2018 10:59 AM    Triglyceride 129 03/26/2018 10:59 AM    CHOL/HDL Ratio 4.0 09/08/2010 09:44 AM     Lab Results   Component Value Date/Time    TSH 2.200 03/26/2018 10:59 AM     No results found for: PSA, PSA2, PSAR1, Trini Polite, PSAR3, [id number], [id number], PSALT  Lab Results   Component Value Date/Time    Hemoglobin A1c 5.4 08/28/2018 10:25 AM     Lab Results   Component Value Date/Time    VITAMIN D, 25-HYDROXY 25.0 (L) 03/26/2018 10:59 AM       Lab Results   Component Value Date/Time    ALT (SGPT) 12 11/27/2018 09:45 AM    AST (SGOT) 16 11/27/2018 09:45 AM    Alk.  phosphatase 88 11/27/2018 09:45 AM    Bilirubin, total 0.4 11/27/2018 09:45 AM           Assessment/Plan:    1. Essential hypertension  - Blood pressure is not at goal will stop HCTZ and change to chlorthalidone and continue bystolic 35QK. Pulse is 60  Return in one week for BP check nurse visit and kidney check. - chlorthalidone (HYGROTEN) 25 mg tablet; Take 1 Tab by mouth daily. Dispense: 30 Tab; Refill: 1  - METABOLIC PANEL, BASIC    2. OA of the knees: taking mobic with relief of pain. Discussed weight loss will help with knee pain     3. Morbid obesity: patient is having difficulty with weight loss and considering bariatric surgery. She will let me know when she is ready    4.  SUDEEP: using CPAP   Has mammogram scheduled  Declined vaccines     Follow up in one week for BP check and kidney check in one week    Follow up with me in 3 months  Tiara Gaines MD

## 2019-09-05 NOTE — PATIENT INSTRUCTIONS
Office Policies    Phone calls/patient messages:            Please allow up to 24 hours for someone in the office to contact you about your call or message. Be mindful your provider may be out of the office or your message may require further review. We encourage you to use [employer] for your messages as this is a faster, more efficient way to communicate with our office                         Medication Refills:            Prescription medications require 48-72 business hours to process. We encourage you to use [employer] for your refills. For controlled medications: Please allow 72 business hours to process. Certain medications may require you to  a written prescription at our office. NO narcotic/controlled medications will be prescribed after 4pm Monday through Friday or on weekends              Form/Paperwork Completion:            Please note a $25 fee may incur for all paperwork for completed by our providers. We ask that you allow 7-10 business days. Pre-payment is due prior to picking up/faxing the completed form. You may also download your forms to [employer] to have your doctor print off.

## 2019-09-12 ENCOUNTER — CLINICAL SUPPORT (OUTPATIENT)
Dept: INTERNAL MEDICINE CLINIC | Age: 64
End: 2019-09-12

## 2019-09-12 VITALS
HEIGHT: 60 IN | DIASTOLIC BLOOD PRESSURE: 68 MMHG | WEIGHT: 233 LBS | BODY MASS INDEX: 45.75 KG/M2 | OXYGEN SATURATION: 97 % | RESPIRATION RATE: 18 BRPM | SYSTOLIC BLOOD PRESSURE: 158 MMHG | HEART RATE: 53 BPM | TEMPERATURE: 97.9 F

## 2019-09-12 DIAGNOSIS — M17.10 PRIMARY OSTEOARTHRITIS OF KNEE, UNSPECIFIED LATERALITY: ICD-10-CM

## 2019-09-12 DIAGNOSIS — Z01.30 BLOOD PRESSURE CHECK: Primary | ICD-10-CM

## 2019-09-12 RX ORDER — MELOXICAM 15 MG/1
15 TABLET ORAL
Qty: 90 TAB | Refills: 0 | Status: SHIPPED | OUTPATIENT
Start: 2019-09-12 | End: 2019-12-07 | Stop reason: SDUPTHER

## 2019-09-12 NOTE — PATIENT INSTRUCTIONS
Office Policies    Phone calls/patient messages:            Please allow up to 24 hours for someone in the office to contact you about your call or message. Be mindful your provider may be out of the office or your message may require further review. We encourage you to use [employer] for your messages as this is a faster, more efficient way to communicate with our office                         Medication Refills:            Prescription medications require 48-72 business hours to process. We encourage you to use [employer] for your refills. For controlled medications: Please allow 72 business hours to process. Certain medications may require you to  a written prescription at our office. NO narcotic/controlled medications will be prescribed after 4pm Monday through Friday or on weekends              Form/Paperwork Completion:            Please note a $25 fee may incur for all paperwork for completed by our providers. We ask that you allow 7-10 business days. Pre-payment is due prior to picking up/faxing the completed form. You may also download your forms to [employer] to have your doctor print off.

## 2019-09-29 DIAGNOSIS — I10 ESSENTIAL HYPERTENSION: ICD-10-CM

## 2019-09-29 RX ORDER — CHLORTHALIDONE 25 MG/1
TABLET ORAL
Qty: 30 TAB | Refills: 1 | Status: SHIPPED | OUTPATIENT
Start: 2019-09-29 | End: 2019-10-24 | Stop reason: SDUPTHER

## 2019-10-24 DIAGNOSIS — I10 ESSENTIAL HYPERTENSION: ICD-10-CM

## 2019-10-24 RX ORDER — CHLORTHALIDONE 25 MG/1
TABLET ORAL
Qty: 30 TAB | Refills: 1 | Status: SHIPPED | OUTPATIENT
Start: 2019-10-24 | End: 2019-11-27 | Stop reason: SDUPTHER

## 2019-11-27 DIAGNOSIS — I10 ESSENTIAL HYPERTENSION: ICD-10-CM

## 2019-11-27 RX ORDER — CHLORTHALIDONE 25 MG/1
TABLET ORAL
Qty: 30 TAB | Refills: 1 | Status: SHIPPED | OUTPATIENT
Start: 2019-11-27 | End: 2019-12-12 | Stop reason: SDUPTHER

## 2019-12-07 DIAGNOSIS — M17.10 PRIMARY OSTEOARTHRITIS OF KNEE, UNSPECIFIED LATERALITY: ICD-10-CM

## 2019-12-07 RX ORDER — MELOXICAM 15 MG/1
TABLET ORAL
Qty: 90 TAB | Refills: 0 | Status: SHIPPED | OUTPATIENT
Start: 2019-12-07 | End: 2020-07-02 | Stop reason: SDUPTHER

## 2019-12-12 DIAGNOSIS — I10 ESSENTIAL HYPERTENSION: ICD-10-CM

## 2019-12-12 RX ORDER — CHLORTHALIDONE 25 MG/1
TABLET ORAL
Qty: 90 TAB | Refills: 0 | Status: SHIPPED | OUTPATIENT
Start: 2019-12-12 | End: 2020-03-09 | Stop reason: ALTCHOICE

## 2019-12-12 NOTE — TELEPHONE ENCOUNTER
PCP: Kermit Lopez MD    Last appt: 9/12/2019  No future appointments.     Requested Prescriptions     Pending Prescriptions Disp Refills    chlorthalidone (HYGROTEN) 25 mg tablet 90 Tab 0     Sig: TAKE 1 TABLET BY MOUTH EVERY DAY

## 2020-02-17 ENCOUNTER — TELEPHONE (OUTPATIENT)
Dept: INTERNAL MEDICINE CLINIC | Age: 65
End: 2020-02-17

## 2020-02-17 NOTE — TELEPHONE ENCOUNTER
#[phone] pt states she needs to come in and have b/p and CPE she states. Pt states she needs something sooner than May. Please call to schedule.

## 2020-03-09 ENCOUNTER — OFFICE VISIT (OUTPATIENT)
Dept: INTERNAL MEDICINE CLINIC | Age: 65
End: 2020-03-09

## 2020-03-09 VITALS
SYSTOLIC BLOOD PRESSURE: 190 MMHG | WEIGHT: 230 LBS | BODY MASS INDEX: 45.16 KG/M2 | OXYGEN SATURATION: 99 % | HEART RATE: 108 BPM | HEIGHT: 60 IN | TEMPERATURE: 98.1 F | RESPIRATION RATE: 18 BRPM | DIASTOLIC BLOOD PRESSURE: 90 MMHG

## 2020-03-09 DIAGNOSIS — M17.10 PRIMARY OSTEOARTHRITIS OF KNEE, UNSPECIFIED LATERALITY: ICD-10-CM

## 2020-03-09 DIAGNOSIS — I10 ESSENTIAL HYPERTENSION: Primary | ICD-10-CM

## 2020-03-09 DIAGNOSIS — Z78.0 POST-MENOPAUSAL: ICD-10-CM

## 2020-03-09 DIAGNOSIS — Z13.820 SCREENING FOR OSTEOPOROSIS: ICD-10-CM

## 2020-03-09 RX ORDER — NEBIVOLOL 20 MG/1
20 TABLET ORAL DAILY
Qty: 30 TAB | Refills: 2 | Status: SHIPPED | OUTPATIENT
Start: 2020-03-09 | End: 2020-04-24 | Stop reason: ALTCHOICE

## 2020-03-09 RX ORDER — FUROSEMIDE 20 MG/1
20 TABLET ORAL
Qty: 30 TAB | Refills: 1 | Status: SHIPPED | OUTPATIENT
Start: 2020-03-09 | End: 2020-04-24 | Stop reason: ALTCHOICE

## 2020-03-09 NOTE — PROGRESS NOTES
Reviewed record in preparation for visit and have obtained necessary documentation. Identified pt with two pt identifiers(name and ). Chief Complaint   Patient presents with    Complete Physical    Hypertension       Health Maintenance Due   Topic Date Due    DTaP/Tdap/Td  (1 - Tdap) 1965    Shingles Vaccine (1 of 2) 2004    Glaucoma Screening   2019    Bone Mineral Density   2019    Pneumococcal Vaccine (1 of 1 - PPSV23) 2019       Ms. Edwards has a reminder for a \"due or due soon\" health maintenance. I have asked that she discuss this further with her primary care provider for follow-up on this health maintenance. Coordination of Care Questionnaire:  :     1) Have you been to an emergency room, urgent care clinic since your last visit? no   Hospitalized since your last visit? no             2) Have you seen or consulted any other health care providers outside of [address] [place] [address] since your last visit? no  (Include any pap smears or colon screenings in this section.)    3) In the event something were to happen to you and you were unable to speak on your behalf, do you have an Advance Directive/ Living Will in place stating your wishes? NO    Do you have an Advance Directive on file? no    4) Are you interested in receiving information on Advance Directives? NO    Patient is accompanied by self I have received verbal consent from Rey Ramires to discuss any/all medical information while they are present in the room.

## 2020-03-09 NOTE — PROGRESS NOTES
CC: Complete Physical and Hypertension      HPI:    She is a 71 y.o. female who presents for evaluation of blood pressure         BLood pressure elevated again, She is currently on bystolic 77SX stopped chrortalidone \" did not like how it made her feel. Denies dizziness. Generally feels well. ROS:  10 systems reviewed and negative other than HPI    Past Medical History:   Diagnosis Date    HTN (hypertension) 1/15/2010    Sleep apnea 1/20/2015    Sleep apnea, obstructive 1/5/2016       Current Outpatient Medications on File Prior to Visit   Medication Sig Dispense Refill    chlorthalidone (HYGROTEN) 25 mg tablet TAKE 1 TABLET BY MOUTH EVERY DAY 90 Tab 0    meloxicam (MOBIC) 15 mg tablet TAKE 1 TABLET BY MOUTH DAILY AS NEEDED FOR PAIN 90 Tab 0    nebivolol (BYSTOLIC) 10 mg tablet Take 1 Tab by mouth daily. 90 Tab 3    sodium hyaluronate (SUPARTZ FX/EUFLEXXA/HYALGAN) 10 mg/mL syrg injection 20 mg by Intra artICUlar route as needed.  YUVAFEM 10 mcg tab vaginal tablet Insert 10 mcg into vagina.  Comp. Stocking,Knee,Regular,Lrg misc 20-30mmHG 1 Units 2    multivitamin (ONE A DAY) tablet Take 1 tablet by mouth daily. No current facility-administered medications on file prior to visit.         Past Surgical History:   Procedure Laterality Date    HX APPENDECTOMY      HX HYSTERECTOMY  approx 1987    2/2 uterine fibroids    HX OTHER SURGICAL  2002    hiatal hernia repair        Family History   Problem Relation Age of Onset    Cancer Mother     Hypertension Father     Cancer Father     Hypertension Sister     Hypertension Brother      Reviewed and no changes     Social History     Socioeconomic History    Marital status: SINGLE     Spouse name: Not on file    Number of children: Not on file    Years of education: Not on file    Highest education level: Not on file   Occupational History    Not on file   Social Needs    Financial resource strain: Not on file    Food insecurity: Worry: Not on file     Inability: Not on file    Transportation needs:     Medical: Not on file     Non-medical: Not on file   Tobacco Use    Smoking status: Never Smoker    Smokeless tobacco: Never Used   Substance and Sexual Activity    Alcohol use:  Yes     Alcohol/week: 4.2 standard drinks     Types: 5 Glasses of wine per week     Comment: 1 glass per night    Drug use: No    Sexual activity: Yes     Partners: Male     Birth control/protection: Condom   Lifestyle    Physical activity:     Days per week: Not on file     Minutes per session: Not on file    Stress: Not on file   Relationships    Social connections:     Talks on phone: Not on file     Gets together: Not on file     Attends Judaism service: Not on file     Active member of club or organization: Not on file     Attends meetings of clubs or organizations: Not on file     Relationship status: Not on file    Intimate partner violence:     Fear of current or ex partner: Not on file     Emotionally abused: Not on file     Physically abused: Not on file     Forced sexual activity: Not on file   Other Topics Concern    Not on file   Social History Narrative    Not on file            Visit Vitals  /90 (BP 1 Location: Right arm, BP Patient Position: Sitting)   Pulse (!) 108   Temp 98.1 °F (36.7 °C) (Oral)   Resp 18   Ht 5' (1.524 m)   Wt 230 lb (104.3 kg)   SpO2 99%   BMI 44.92 kg/m²       Physical Examination:   General - Well appearing female, obesity  HEENT - PERRL, TM no erythema/opacification, normal nasal turbinates, oropharynx no erythema or exudate, MMM  Neck - supple, no bruits, no TMG, no LAD  Pulm - clear to auscultation bilaterally  Cardio - RRR, normal S1 S2, no murmur gallops or rubs  Abd - soft, nontender, no masses, no HSM  Extrem - no edema, +2 distal pulses  Psych - normal affect, appropriate mood    Lab Results   Component Value Date/Time    WBC 7.6 11/27/2018 09:45 AM    HGB 13.2 11/27/2018 09:45 AM    HCT 40.5 11/27/2018 09:45 AM    PLATELET 331 42/81/9370 09:45 AM    MCV 85 11/27/2018 09:45 AM     Lab Results   Component Value Date/Time    Sodium 142 06/27/2019 02:02 PM    Potassium 4.4 06/27/2019 02:02 PM    Chloride 100 06/27/2019 02:02 PM    CO2 23 06/27/2019 02:02 PM    Anion gap 11 09/08/2010 09:44 AM    Glucose 122 (H) 06/27/2019 02:02 PM    BUN 20 06/27/2019 02:02 PM    Creatinine 0.95 06/27/2019 02:02 PM    BUN/Creatinine ratio 21 06/27/2019 02:02 PM    GFR est AA 73 06/27/2019 02:02 PM    GFR est non-AA 63 06/27/2019 02:02 PM    Calcium 9.7 06/27/2019 02:02 PM     Lab Results   Component Value Date/Time    Cholesterol, total 215 (H) 03/26/2018 10:59 AM    HDL Cholesterol 56 03/26/2018 10:59 AM    LDL, calculated 133 (H) 03/26/2018 10:59 AM    VLDL, calculated 26 03/26/2018 10:59 AM    Triglyceride 129 03/26/2018 10:59 AM    CHOL/HDL Ratio 4.0 09/08/2010 09:44 AM     Lab Results   Component Value Date/Time    TSH 2.200 03/26/2018 10:59 AM     No results found for: PSA, PSA2, PSAR1, Tilman Boss, [id number], [id number], PSALT  Lab Results   Component Value Date/Time    Hemoglobin A1c 5.4 08/28/2018 10:25 AM     Lab Results   Component Value Date/Time    VITAMIN D, 25-HYDROXY 25.0 (L) 03/26/2018 10:59 AM       Lab Results   Component Value Date/Time    ALT (SGPT) 12 11/27/2018 09:45 AM    AST (SGOT) 16 11/27/2018 09:45 AM    Alk. phosphatase 88 11/27/2018 09:45 AM    Bilirubin, total 0.4 11/27/2018 09:45 AM           Assessment/Plan:    1. Essential hypertension  - Blood pressure is not at goal increase bystolic to 10KC. Did not tolerate diuretics, but states tolerated lasix.    Orders Placed This Encounter    DEXA BONE DENSITY STUDY AXIAL     Standing Status:   Future     Standing Expiration Date:   4/9/2021     Order Specific Question:   Reason for Exam     Answer:   evaluate bone density - screening for osteoporosis    METABOLIC PANEL, BASIC    CBC WITH AUTOMATED DIFF    nebivoloL (BYSTOLIC) 20 mg tablet Sig: Take 1 Tab by mouth daily. Dispense:  30 Tab     Refill:  2    furosemide (LASIX) 20 mg tablet     Sig: Take 1 Tab by mouth daily as needed (swelling). Dispense:  30 Tab     Refill:  1       2. OA of the knees: taking mobic with relief of pain. Discussed weight loss will help with knee pain     3. Morbid obesity: patient is having difficulty with weight loss and considering bariatric surgery. She will let me know when she is ready    4.  SUDEEP: using CPAP   Has mammogram scheduled  Declined vaccines     Follow up in 4 weeks        Yu Jean MD

## 2020-03-10 LAB
BASOPHILS # BLD AUTO: 0 X10E3/UL (ref 0–0.2)
BASOPHILS NFR BLD AUTO: 0 %
BUN SERPL-MCNC: 14 MG/DL (ref 8–27)
BUN/CREAT SERPL: 16 (ref 12–28)
CALCIUM SERPL-MCNC: 9 MG/DL (ref 8.7–10.3)
CHLORIDE SERPL-SCNC: 103 MMOL/L (ref 96–106)
CO2 SERPL-SCNC: 27 MMOL/L (ref 20–29)
CREAT SERPL-MCNC: 0.88 MG/DL (ref 0.57–1)
EOSINOPHIL # BLD AUTO: 0.1 X10E3/UL (ref 0–0.4)
EOSINOPHIL NFR BLD AUTO: 1 %
ERYTHROCYTE [DISTWIDTH] IN BLOOD BY AUTOMATED COUNT: 12.7 % (ref 11.7–15.4)
GLUCOSE SERPL-MCNC: 71 MG/DL (ref 65–99)
HCT VFR BLD AUTO: 43 % (ref 34–46.6)
HGB BLD-MCNC: 14 G/DL (ref 11.1–15.9)
IMM GRANULOCYTES # BLD AUTO: 0 X10E3/UL (ref 0–0.1)
IMM GRANULOCYTES NFR BLD AUTO: 0 %
LYMPHOCYTES # BLD AUTO: 1.6 X10E3/UL (ref 0.7–3.1)
LYMPHOCYTES NFR BLD AUTO: 24 %
MCH RBC QN AUTO: 28.9 PG (ref 26.6–33)
MCHC RBC AUTO-ENTMCNC: 32.6 G/DL (ref 31.5–35.7)
MCV RBC AUTO: 89 FL (ref 79–97)
MONOCYTES # BLD AUTO: 0.3 X10E3/UL (ref 0.1–0.9)
MONOCYTES NFR BLD AUTO: 5 %
NEUTROPHILS # BLD AUTO: 4.7 X10E3/UL (ref 1.4–7)
NEUTROPHILS NFR BLD AUTO: 70 %
PLATELET # BLD AUTO: 297 X10E3/UL (ref 150–450)
POTASSIUM SERPL-SCNC: 3.8 MMOL/L (ref 3.5–5.2)
RBC # BLD AUTO: 4.85 X10E6/UL (ref 3.77–5.28)
SODIUM SERPL-SCNC: 143 MMOL/L (ref 134–144)
WBC # BLD AUTO: 6.7 X10E3/UL (ref 3.4–10.8)

## 2020-03-18 ENCOUNTER — HOSPITAL ENCOUNTER (OUTPATIENT)
Dept: BONE DENSITY | Age: 65
Discharge: HOME | End: 2020-03-18
Attending: INTERNAL MEDICINE
Payer: MEDICARE

## 2020-03-18 DIAGNOSIS — Z78.0 POST-MENOPAUSAL: ICD-10-CM

## 2020-03-18 DIAGNOSIS — Z13.820 SCREENING FOR OSTEOPOROSIS: ICD-10-CM

## 2020-03-18 PROCEDURE — 77080 DXA BONE DENSITY AXIAL: CPT

## 2020-03-18 NOTE — PROGRESS NOTES
Bone density testing shows osteopenia. Recommend weight bearing exercise, like walking, fall prevention, and avoidance of heavy alcohol use. Calcium 1200mg daily, most from foods, less from supplement. Start Vitamin D3 2,000 iu once a day ( if already not on it) .   Recheck bone density test in 3 years

## 2020-04-23 ENCOUNTER — TELEPHONE (OUTPATIENT)
Dept: INTERNAL MEDICINE CLINIC | Age: 65
End: 2020-04-23

## 2020-04-23 NOTE — TELEPHONE ENCOUNTER
Spoke with patient. Two pt identifiers confirmed. Patient advised that Dr. Reynold Flaherty would like for her to schedule a virtual visit with her to discuss her hair loss. Patient offered an appointment on 04/23/2020. Appointment accepted. Pt verbalized understanding of information discussed w/ no further questions at this time.    Patient advised if anything changes or if unable to keep this appointment to call the office

## 2020-04-23 NOTE — TELEPHONE ENCOUNTER
#[phone] pt states she is loosing her hair. She would like an appt to discuss medication as she thinks this is the problem. Please call to advise and/or schedule.

## 2020-04-24 ENCOUNTER — VIRTUAL VISIT (OUTPATIENT)
Dept: INTERNAL MEDICINE CLINIC | Age: 65
End: 2020-04-24

## 2020-04-24 DIAGNOSIS — I10 ESSENTIAL HYPERTENSION: Primary | ICD-10-CM

## 2020-04-24 DIAGNOSIS — L65.9 HAIR LOSS: ICD-10-CM

## 2020-04-24 RX ORDER — HYDROCHLOROTHIAZIDE 12.5 MG/1
12.5 TABLET ORAL DAILY
Qty: 30 TAB | Refills: 1 | Status: SHIPPED | OUTPATIENT
Start: 2020-04-24 | End: 2020-05-18

## 2020-04-24 RX ORDER — AMLODIPINE BESYLATE 5 MG/1
5 TABLET ORAL DAILY
Qty: 30 TAB | Refills: 1 | Status: SHIPPED | OUTPATIENT
Start: 2020-04-24 | End: 2020-05-18

## 2020-04-24 NOTE — PROGRESS NOTES
Reviewed record in preparation for visit and have obtained necessary documentation. Identified pt with two pt identifiers(name and ). Chief Complaint   Patient presents with    Hair/Scalp Problem       Health Maintenance Due   Topic Date Due    DTaP/Tdap/Td  (1 - Tdap) 1975    Shingles Vaccine (1 of 2) 2004    Glaucoma Screening   2019    Pneumococcal Vaccine (1 of 1 - PPSV23) 2019    Annual Well Visit  2020       Ms. Edwards has a reminder for a \"due or due soon\" health maintenance. I have asked that she discuss this further with her primary care provider for follow-up on this health maintenance. Coordination of Care Questionnaire:  :     1) Have you been to an emergency room, urgent care clinic since your last visit? no   Hospitalized since your last visit? no             2) Have you seen or consulted any other health care providers outside of [address] [place] [address] since your last visit? no  (Include any pap smears or colon screenings in this section.)    3) In the event something were to happen to you and you were unable to speak on your behalf, do you have an Advance Directive/ Living Will in place stating your wishes? NO    Do you have an Advance Directive on file? no    4) Are you interested in receiving information on Advance Directives? NO    Patient is accompanied by self I have received verbal consent from Sameer Pizano to discuss any/all medical information while they are present in the room.

## 2020-04-24 NOTE — PROGRESS NOTES
CC: Hair/Scalp Problem      HPI:    She is a 71 y.o. female who presents for evaluation of     She was  last seen on March 9 at that time her blood pressure was not at goal and Bystolic was increased to 20 mg  Since increasing dose notes hair loss and increased sweating  She has patches of hair loss    136/77  140/78    Previous meds - valsartan recall  Amlodipine possibly caused swelling but unclear reviewed chart      This is an established visit conducted via telemedicine with video. The patient has been instructed that this meets HIPAA criteria and acknowledges and agrees to this method of visitation. Pursuant to the emergency declaration under the [address] [place] [address], [facility] waiver authority and the [employer] Act, this Virtual Visit was conducted, with patient's consent, to reduce the patient's risk of exposure to COVID-19 and provide continuity of care for an established patient. Services were provided through a video synchronous discussion virtually to substitute for in-person clinic visit. ROS:  Constitutional: negative for fevers, chills, anorexia and weight loss  10 systems reviewed and negative other than HPI    Past Medical History:   Diagnosis Date    HTN (hypertension) 1/15/2010    Sleep apnea 1/20/2015    Sleep apnea, obstructive 1/5/2016       Current Outpatient Medications on File Prior to Visit   Medication Sig Dispense Refill    meloxicam (MOBIC) 15 mg tablet TAKE 1 TABLET BY MOUTH DAILY AS NEEDED FOR PAIN 90 Tab 0    sodium hyaluronate (SUPARTZ FX/EUFLEXXA/HYALGAN) 10 mg/mL syrg injection 20 mg by Intra artICUlar route as needed.  YUVAFEM 10 mcg tab vaginal tablet Insert 10 mcg into vagina.  Comp. Stocking,Knee,Regular,Lrg misc 20-30mmHG 1 Units 2    multivitamin (ONE A DAY) tablet Take 1 tablet by mouth daily. No current facility-administered medications on file prior to visit. Past Surgical History:   Procedure Laterality Date    HX APPENDECTOMY      HX HYSTERECTOMY  approx 1987 2/2 uterine fibroids    HX OTHER SURGICAL  2002    hiatal hernia repair        Family History   Problem Relation Age of Onset    Cancer Mother     Hypertension Father     Cancer Father     Hypertension Sister     Hypertension Brother      Reviewed and no changes     Social History     Socioeconomic History    Marital status: SINGLE     Spouse name: Not on file    Number of children: Not on file    Years of education: Not on file    Highest education level: Not on file   Occupational History    Not on file   Social Needs    Financial resource strain: Not on file    Food insecurity     Worry: Not on file     Inability: Not on file    Transportation needs     Medical: Not on file     Non-medical: Not on file   Tobacco Use    Smoking status: Never Smoker    Smokeless tobacco: Never Used   Substance and Sexual Activity    Alcohol use: Yes     Alcohol/week: 4.2 standard drinks     Types: 5 Glasses of wine per week     Comment: 1 glass per night    Drug use: No    Sexual activity: Yes     Partners: Male     Birth control/protection: Condom   Lifestyle    Physical activity     Days per week: Not on file     Minutes per session: Not on file    Stress: Not on file   Relationships    Social connections     Talks on phone: Not on file     Gets together: Not on file     Attends Buddhist service: Not on file     Active member of club or organization: Not on file     Attends meetings of clubs or organizations: Not on file     Relationship status: Not on file    Intimate partner violence     Fear of current or ex partner: Not on file     Emotionally abused: Not on file     Physically abused: Not on file     Forced sexual activity: Not on file   Other Topics Concern    Not on file   Social History Narrative    Not on file          There were no vitals taken for this visit.     Physical Examination:   Gen: well appearing female  HEENT: normal conjunctiva, no audible congestion, patient does not see oral erythema, has MMM  Neck: patient does not feel enlarged or tender LAD or masses  Resp: normal respiratory effort, no audible wheezing. CV: patient does not feel palpitations or heart irregularity  Abd: patient does not feel abdominal tenderness or mass, patient does not notice distension  Extrem: patient does not see swelling in ankles or joints.    Neuro: Alert and oriented, able to answer questions without difficulty, able to move all extremities and walk normally      skin: diffuse hair loss  In scalp  Lab Results   Component Value Date/Time    WBC 6.7 03/09/2020 02:29 PM    HGB 14.0 03/09/2020 02:29 PM    HCT 43.0 03/09/2020 02:29 PM    PLATELET 579 51/66/4691 02:29 PM    MCV 89 03/09/2020 02:29 PM     Lab Results   Component Value Date/Time    Sodium 143 03/09/2020 02:29 PM    Potassium 3.8 03/09/2020 02:29 PM    Chloride 103 03/09/2020 02:29 PM    CO2 27 03/09/2020 02:29 PM    Anion gap 11 09/08/2010 09:44 AM    Glucose 71 03/09/2020 02:29 PM    BUN 14 03/09/2020 02:29 PM    Creatinine 0.88 03/09/2020 02:29 PM    BUN/Creatinine ratio 16 03/09/2020 02:29 PM    GFR est AA 80 03/09/2020 02:29 PM    GFR est non-AA 69 03/09/2020 02:29 PM    Calcium 9.0 03/09/2020 02:29 PM     Lab Results   Component Value Date/Time    Cholesterol, total 215 (H) 03/26/2018 10:59 AM    HDL Cholesterol 56 03/26/2018 10:59 AM    LDL, calculated 133 (H) 03/26/2018 10:59 AM    VLDL, calculated 26 03/26/2018 10:59 AM    Triglyceride 129 03/26/2018 10:59 AM    CHOL/HDL Ratio 4.0 09/08/2010 09:44 AM     Lab Results   Component Value Date/Time    TSH 2.200 03/26/2018 10:59 AM     No results found for: PSA, Leotis Candy, [id number], [id number], PSALT  Lab Results   Component Value Date/Time    Hemoglobin A1c 5.4 08/28/2018 10:25 AM     Lab Results   Component Value Date/Time    VITAMIN D, 25-HYDROXY 25.0 (L) 03/26/2018 10:59 AM       Lab Results   Component Value Date/Time    ALT (SGPT) 12 11/27/2018 09:45 AM    AST (SGOT) 16 11/27/2018 09:45 AM    Alk. phosphatase 88 11/27/2018 09:45 AM    Bilirubin, total 0.4 11/27/2018 09:45 AM           Assessment/Plan:    1. Essential hypertension  - blood pressure was close to goal on bystolic 88TQ but noted significant hair loss stop bystolic   - amLODIPine (NORVASC) 5 mg tablet; Take 1 Tab by mouth daily. Dispense: 30 Tab; Refill: 1  - hydroCHLOROthiazide (HYDRODIURIL) 12.5 mg tablet; Take 1 Tab by mouth daily. Dispense: 30 Tab; Refill: 1  -recommend checking blood pressure with goal of less than  130/85 on average. Follow lo sodium diet. Given DASH diet guidelines. Recommend cardiovascular exercise regularly. Work on weight reduction. Call with blood pressure readings. 2. Hair loss  Suspect due to bystolic reviewed 1% reported post marketing       Follow up in 2-4 weeks for HTN      Rosemarie Cope MD    This is an established visit conducted via real time video and audio telemedicine. The patient has been instructed that this meets HIPAA criteria and acknowledges and agrees to this method of visitation.

## 2020-06-05 ENCOUNTER — TELEPHONE (OUTPATIENT)
Dept: INTERNAL MEDICINE CLINIC | Age: 65
End: 2020-06-05

## 2020-06-05 NOTE — TELEPHONE ENCOUNTER
Spoke with patient. Two pt identifiers confirmed. Patient states that she has been having a lot of swelling in her ankles since she started taking the amlodipine. Patient states that her blood pressure is more elevated as well. Patient states that she elevates her feet at night but it does not seem to help. Advised patient that I will check with Dr. Elin Reaves and will give her a call back as soon as I can. Pt verbalized understanding of information discussed w/ no further questions at this time.

## 2020-06-05 NOTE — TELEPHONE ENCOUNTER
Morris Cooks [facility] [employer] Office Pool   Phone Number: [phone]             Caller (if not patient): n/a   Relationship of caller (if not patient): n/a   Best contact number(s): [phone]   Name of medication and dosage if known: \"Amlodipine\" BP medication   Is patient out of this medication (yes/no): no   Pharmacy name: [facility] listed in chart? (yes/no): yes   Pharmacy phone number: n/a   Date of last visit: Friday, April 24, 2020 01:30 PM   Details to clarify the request: Pt mentioned that the bp medication is causing swelling and weight gain. She requested an appt on Monday Marybel 15 with her PCP and did not want first available with a different PCP. Patient was transferred to the practice, however I was advised to still send a message.      Copy/Paste  ENVERA

## 2020-06-15 ENCOUNTER — VIRTUAL VISIT (OUTPATIENT)
Dept: INTERNAL MEDICINE CLINIC | Age: 65
End: 2020-06-15

## 2020-06-15 DIAGNOSIS — R60.9 EDEMA, UNSPECIFIED TYPE: ICD-10-CM

## 2020-06-15 DIAGNOSIS — E66.01 MORBID OBESITY WITH BMI OF 40.0-44.9, ADULT (HCC): ICD-10-CM

## 2020-06-15 DIAGNOSIS — I10 ESSENTIAL HYPERTENSION: Primary | ICD-10-CM

## 2020-06-15 RX ORDER — IRBESARTAN 150 MG/1
150 TABLET ORAL DAILY
Qty: 30 TAB | Refills: 2 | Status: SHIPPED | OUTPATIENT
Start: 2020-06-15 | End: 2020-07-02 | Stop reason: SDUPTHER

## 2020-06-15 NOTE — PROGRESS NOTES
Reviewed record in preparation for visit and have obtained necessary documentation. Identified pt with two pt identifiers(name and ). Chief Complaint   Patient presents with    Hypertension       Health Maintenance Due   Topic Date Due    DTaP/Tdap/Td  (1 - Tdap) 1975    Shingles Vaccine (1 of 2) 2004    Glaucoma Screening   2019    Pneumococcal Vaccine (1 of 1 - PPSV23) 2019    Annual Well Visit  2020       Ms. Edwards has a reminder for a \"due or due soon\" health maintenance. I have asked that she discuss this further with her primary care provider for follow-up on this health maintenance. Coordination of Care Questionnaire:  :     1) Have you been to an emergency room, urgent care clinic since your last visit? no   Hospitalized since your last visit? no             2) Have you seen or consulted any other health care providers outside of [address] [place] [address] since your last visit? no  (Include any pap smears or colon screenings in this section.)    3) In the event something were to happen to you and you were unable to speak on your behalf, do you have an Advance Directive/ Living Will in place stating your wishes? NO    Do you have an Advance Directive on file? no    4) Are you interested in receiving information on Advance Directives? NO    Patient is accompanied by self I have received verbal consent from Shimon Moreland to discuss any/all medical information while they are present in the room.

## 2020-06-15 NOTE — PROGRESS NOTES
CC: Hypertension      HPI:    She is a 71 y.o. female who presents for evaluation of swelling in her feet bilaterally. As soon as started on amlodipine started to have swelling in feet. Swelling in feet from morning to night hard to put on shoes  Normal EF in 2014  Denies dyspnea and PND and CP     bystolic caused hair loss         SUDEEP using CPAP         This is an established visit conducted via telemedicine with video. The patient has been instructed that this meets HIPAA criteria and acknowledges and agrees to this method of visitation. Pursuant to the emergency declaration under the [facility] waiver authority and the [employer] Act, this Virtual Visit was conducted, with patient's consent, to reduce the patient's risk of exposure to COVID-19 and provide continuity of care for an established patient. Services were provided through a video synchronous discussion virtually to substitute for in-person clinic visit. ROS:  Constitutional: negative for fevers, chills, anorexia and weight loss  10 systems reviewed and negative other than HPI     Past Medical History:   Diagnosis Date    HTN (hypertension) 1/15/2010    Sleep apnea 1/20/2015    Sleep apnea, obstructive 1/5/2016       Current Outpatient Medications on File Prior to Visit   Medication Sig Dispense Refill    amLODIPine (NORVASC) 5 mg tablet TAKE 1 TABLET BY MOUTH EVERY DAY 30 Tab 5    hydroCHLOROthiazide (HYDRODIURIL) 12.5 mg tablet TAKE 1 TABLET BY MOUTH EVERY DAY 30 Tab 5    meloxicam (MOBIC) 15 mg tablet TAKE 1 TABLET BY MOUTH DAILY AS NEEDED FOR PAIN 90 Tab 0    sodium hyaluronate (SUPARTZ FX/EUFLEXXA/HYALGAN) 10 mg/mL syrg injection 20 mg by Intra artICUlar route as needed.  YUVAFEM 10 mcg tab vaginal tablet Insert 10 mcg into vagina.  Comp. Stocking,Knee,Regular,Lrg misc 20-30mmHG 1 Units 2    multivitamin (ONE A DAY) tablet Take 1 tablet by mouth daily. No current facility-administered medications on file prior to visit. Past Surgical History:   Procedure Laterality Date    HX APPENDECTOMY      HX HYSTERECTOMY  approx 1987 2/2 uterine fibroids    HX OTHER SURGICAL  2002    hiatal hernia repair        Family History   Problem Relation Age of Onset    Cancer Mother     Hypertension Father     Cancer Father     Hypertension Sister     Hypertension Brother      Reviewed and no changes     Social History     Socioeconomic History    Marital status: SINGLE     Spouse name: Not on file    Number of children: Not on file    Years of education: Not on file    Highest education level: Not on file   Occupational History    Not on file   Social Needs    Financial resource strain: Not on file    Food insecurity     Worry: Not on file     Inability: Not on file    Transportation needs     Medical: Not on file     Non-medical: Not on file   Tobacco Use    Smoking status: Never Smoker    Smokeless tobacco: Never Used   Substance and Sexual Activity    Alcohol use:  Yes     Alcohol/week: 4.2 standard drinks     Types: 5 Glasses of wine per week     Comment: 1 glass per night    Drug use: No    Sexual activity: Yes     Partners: Male     Birth control/protection: Condom   Lifestyle    Physical activity     Days per week: Not on file     Minutes per session: Not on file    Stress: Not on file   Relationships    Social connections     Talks on phone: Not on file     Gets together: Not on file     Attends Shinto service: Not on file     Active member of club or organization: Not on file     Attends meetings of clubs or organizations: Not on file     Relationship status: Not on file    Intimate partner violence     Fear of current or ex partner: Not on file     Emotionally abused: Not on file     Physically abused: Not on file     Forced sexual activity: Not on file   Other Topics Concern    Not on file Social History Narrative    Not on file          There were no vitals taken for this visit. Physical Examination:   Gen: well appearing female  HEENT: normal conjunctiva, no audible congestion, patient does not see oral erythema, has MMM  Neck: patient does not feel enlarged or tender LAD or masses  Resp: normal respiratory effort, no audible wheezing.    CV: patient does not feel palpitations or heart irregularity  Abd: patient does not feel abdominal tenderness or mass, patient does not notice distension  Extrem: patient does see swelling in feet extending to knee   Neuro: Alert and oriented, able to answer questions without difficulty, able to move all extremities and walk normally          Lab Results   Component Value Date/Time    WBC 6.7 03/09/2020 02:29 PM    HGB 14.0 03/09/2020 02:29 PM    HCT 43.0 03/09/2020 02:29 PM    PLATELET 261 58/33/4715 02:29 PM    MCV 89 03/09/2020 02:29 PM     Lab Results   Component Value Date/Time    Sodium 143 03/09/2020 02:29 PM    Potassium 3.8 03/09/2020 02:29 PM    Chloride 103 03/09/2020 02:29 PM    CO2 27 03/09/2020 02:29 PM    Anion gap 11 09/08/2010 09:44 AM    Glucose 71 03/09/2020 02:29 PM    BUN 14 03/09/2020 02:29 PM    Creatinine 0.88 03/09/2020 02:29 PM    BUN/Creatinine ratio 16 03/09/2020 02:29 PM    GFR est AA 80 03/09/2020 02:29 PM    GFR est non-AA 69 03/09/2020 02:29 PM    Calcium 9.0 03/09/2020 02:29 PM     Lab Results   Component Value Date/Time    Cholesterol, total 215 (H) 03/26/2018 10:59 AM    HDL Cholesterol 56 03/26/2018 10:59 AM    LDL, calculated 133 (H) 03/26/2018 10:59 AM    VLDL, calculated 26 03/26/2018 10:59 AM    Triglyceride 129 03/26/2018 10:59 AM    CHOL/HDL Ratio 4.0 09/08/2010 09:44 AM     Lab Results   Component Value Date/Time    TSH 2.200 03/26/2018 10:59 AM     No results found for: PSA, Horris Antonina, PSAR3, [id number], [id number], PSALT  Lab Results   Component Value Date/Time    Hemoglobin A1c 5.4 08/28/2018 10:25 AM     Lab Results   Component Value Date/Time    VITAMIN D, 25-HYDROXY 25.0 (L) 03/26/2018 10:59 AM       Lab Results   Component Value Date/Time    ALT (SGPT) 12 11/27/2018 09:45 AM    Alk. phosphatase 88 11/27/2018 09:45 AM    Bilirubin, total 0.4 11/27/2018 09:45 AM           Assessment/Plan:    1. Essential hypertension  - irbesartan (AVAPRO) 150 mg tablet; Take 1 Tab by mouth daily. Dispense: 30 Tab; Refill: 2  - possibly swelling is due to Norvasc notes association with onset of medication. No other symptoms. Will stop norvasc 5mg and start avapro. Counseled on rare side effect of angioedema and  cough      2. Edema, unspecified type  -  possibly swelling is due to norvasc notes association with onset of medication. 3. SUDEEP: compliant with CPAP  Follow-up and Dispositions    · Return in about 2 weeks (around 6/29/2020) for inperson 30 minute visit . Alicia Mo MD    This is an established visit conducted via real time video and audio telemedicine. The patient has been instructed that this meets HIPAA criteria and acknowledges and agrees to this method of visitation.

## 2020-07-02 ENCOUNTER — OFFICE VISIT (OUTPATIENT)
Dept: INTERNAL MEDICINE CLINIC | Age: 65
End: 2020-07-02

## 2020-07-02 VITALS
RESPIRATION RATE: 18 BRPM | TEMPERATURE: 97.5 F | DIASTOLIC BLOOD PRESSURE: 80 MMHG | WEIGHT: 240 LBS | HEART RATE: 73 BPM | OXYGEN SATURATION: 100 % | BODY MASS INDEX: 47.12 KG/M2 | HEIGHT: 60 IN | SYSTOLIC BLOOD PRESSURE: 147 MMHG

## 2020-07-02 DIAGNOSIS — E55.9 VITAMIN D DEFICIENCY: ICD-10-CM

## 2020-07-02 DIAGNOSIS — M17.10 PRIMARY OSTEOARTHRITIS OF KNEE, UNSPECIFIED LATERALITY: ICD-10-CM

## 2020-07-02 DIAGNOSIS — I10 ESSENTIAL HYPERTENSION: Primary | ICD-10-CM

## 2020-07-02 DIAGNOSIS — G47.33 SLEEP APNEA, OBSTRUCTIVE: ICD-10-CM

## 2020-07-02 DIAGNOSIS — E78.00 HIGH CHOLESTEROL: ICD-10-CM

## 2020-07-02 DIAGNOSIS — Z00.00 MEDICARE ANNUAL WELLNESS VISIT, SUBSEQUENT: ICD-10-CM

## 2020-07-02 RX ORDER — MELOXICAM 15 MG/1
15 TABLET ORAL AS NEEDED
Qty: 30 TAB | Refills: 5 | Status: SHIPPED | OUTPATIENT
Start: 2020-07-02 | End: 2020-12-15

## 2020-07-02 RX ORDER — IRBESARTAN 150 MG/1
150 TABLET ORAL DAILY
Qty: 90 TAB | Refills: 2 | Status: SHIPPED | OUTPATIENT
Start: 2020-07-02 | End: 2021-03-28

## 2020-07-02 RX ORDER — HYDROCHLOROTHIAZIDE 12.5 MG/1
12.5 TABLET ORAL DAILY
Qty: 90 TAB | Refills: 2 | Status: SHIPPED | OUTPATIENT
Start: 2020-07-02 | End: 2021-04-19

## 2020-07-02 NOTE — PROGRESS NOTES
Reviewed record in preparation for visit and have obtained necessary documentation. Identified pt with two pt identifiers(name and ). Chief Complaint   Patient presents with    Complete Physical       Health Maintenance Due   Topic Date Due    DTaP/Tdap/Td  (1 - Tdap) 1975    Shingles Vaccine (1 of 2) 2004    Glaucoma Screening   2019    Pneumococcal Vaccine (1 of 1 - PPSV23) 2019    Annual Well Visit  2020       Ms. Edwards has a reminder for a \"due or due soon\" health maintenance. I have asked that she discuss this further with her primary care provider for follow-up on this health maintenance. Coordination of Care Questionnaire:  :     1) Have you been to an emergency room, urgent care clinic since your last visit? no   Hospitalized since your last visit? no             2) Have you seen or consulted any other health care providers outside of [address] [place] [address] since your last visit? no  (Include any pap smears or colon screenings in this section.)    3) In the event something were to happen to you and you were unable to speak on your behalf, do you have an Advance Directive/ Living Will in place stating your wishes? NO    Do you have an Advance Directive on file? no    4) Are you interested in receiving information on Advance Directives? NO    Patient is accompanied by self I have received verbal consent from Don Connors to discuss any/all medical information while they are present in the room.

## 2020-07-02 NOTE — PATIENT INSTRUCTIONS

## 2020-07-02 NOTE — PROGRESS NOTES
Ms. Jonathan Oliva is a new patient who is here to establish care. CC:  Complete Physical       HPI:      1. Essential hypertension: since change to avapro last month swelling resolved and tolerating avapro well. Denies CP and shortness of breath   Encouraged to exercise   - irbesartan (AVAPRO) 150 mg tablet; Take 1 Tab by mouth daily. Dispense: 90 with 3 refills       2. Edema, unspecified type  -  Improved     3. SUDEEP: compliant with CPAP  Follow-up and Dispositions      Review of systems:  Constitutional: negative for fever, chills, weight loss, night sweats   Eyes : negative for vision changes, eye pain and discharge  Nose and Throat: negative for tinnitus, sore throat   Cardiovascular: negative for chest pain, palpitations and shortness of breath  Respiratory: negative for shortness of breath, cough and wheezing   Gastroinstestinal: negative for abdominal pain, nausea, vomiting, diarrhea, constipation, and blood in the stool  Musculoskeletal: negative for back ache and joint ache   Genitourinary: negative for dysuria, nocturia, polyuria and hematuria   Neurologic: Negative for focal weakness, numbness or incoordination  Skin: negative for rash, pruritus  - hair loss resolved  Hematologic: negative for easy bruising      Past Medical History:   Diagnosis Date    HTN (hypertension) 1/15/2010    Sleep apnea 1/20/2015    Sleep apnea, obstructive 1/5/2016        Past Surgical History:   Procedure Laterality Date    HX APPENDECTOMY      HX HYSTERECTOMY  approx 1987    2/2 uterine fibroids    HX OTHER SURGICAL  2002    hiatal hernia repair        No Known Allergies    Current Outpatient Medications on File Prior to Visit   Medication Sig Dispense Refill    sodium hyaluronate (SUPARTZ FX/EUFLEXXA/HYALGAN) 10 mg/mL syrg injection 20 mg by Intra artICUlar route as needed.  YUVAFEM 10 mcg tab vaginal tablet Insert 10 mcg into vagina.  Comp. Stocking,Knee,Regular,Lrg misc 20-30mmHG 1 Units 2    multivitamin (ONE A DAY) tablet Take 1 tablet by mouth daily. No current facility-administered medications on file prior to visit. family history includes Cancer in her father and mother; Hypertension in her brother, father, and sister. Social History     Socioeconomic History    Marital status: SINGLE     Spouse name: Not on file    Number of children: Not on file    Years of education: Not on file    Highest education level: Not on file   Occupational History    Not on file   Social Needs    Financial resource strain: Not on file    Food insecurity     Worry: Not on file     Inability: Not on file    Transportation needs     Medical: Not on file     Non-medical: Not on file   Tobacco Use    Smoking status: Never Smoker    Smokeless tobacco: Never Used   Substance and Sexual Activity    Alcohol use:  Yes     Alcohol/week: 4.2 standard drinks     Types: 5 Glasses of wine per week     Comment: 1 glass per night    Drug use: No    Sexual activity: Yes     Partners: Male     Birth control/protection: Condom   Lifestyle    Physical activity     Days per week: Not on file     Minutes per session: Not on file    Stress: Not on file   Relationships    Social connections     Talks on phone: Not on file     Gets together: Not on file     Attends Adventist service: Not on file     Active member of club or organization: Not on file     Attends meetings of clubs or organizations: Not on file     Relationship status: Not on file    Intimate partner violence     Fear of current or ex partner: Not on file     Emotionally abused: Not on file     Physically abused: Not on file     Forced sexual activity: Not on file   Other Topics Concern    Not on file   Social History Narrative    Not on file       Visit Vitals  /80 (BP 1 Location: Right arm, BP Patient Position: Sitting)   Pulse 73   Temp 97.5 °F (36.4 °C) (Temporal)   Resp 18   Ht 5' (1.524 m)   Wt 240 lb (108.9 kg)   SpO2 100%   BMI 46.87 kg/m²     General:  Well appearing female no acute distress  HEENT:   PERRL,normal conjunctiva. External ear and canals normal, TMs normal.  Hearing normal to voice. Nose without edema or discharge, normal septum. Lips, teeth, gums normal.  Oropharynx: no erythema, no exudates, no lesions, normal tongue. Neck:  Supple. Thyroid normal size, nontender, without nodules. No carotid bruit. No masses or lymphadenopathy  Respiratory: no respiratory distress,  no wheezing, no rhonchi, no rales. No chest wall tenderness. Cardiovascular:  RRR, normal S1S2, no murmur. Gastrointestinal: normal bowel sounds, soft, nontender, without masses. No hepatosplenomegaly. Extremities +2 pulses, no edema, normal sensation   Musculoskeletal:  Normal gait. Normal digits and nails. Normal strength and tone, no atrophy, and no abnormal movement. Skin:  No rash, no lesions, no ulcers. Skin warm, normal turgor, without induration or nodules. Neuro:  A and OX4, fluent speech, cranial nerves normal 2-12. Sensation normal to light touch.   DTR symmetrical  Psych:  Normal affect      Lab Results   Component Value Date/Time    WBC 6.7 03/09/2020 02:29 PM    HGB 14.0 03/09/2020 02:29 PM    HCT 43.0 03/09/2020 02:29 PM    PLATELET 629 99/45/2534 02:29 PM    MCV 89 03/09/2020 02:29 PM     Lab Results   Component Value Date/Time    Sodium 143 03/09/2020 02:29 PM    Potassium 3.8 03/09/2020 02:29 PM    Chloride 103 03/09/2020 02:29 PM    CO2 27 03/09/2020 02:29 PM    Anion gap 11 09/08/2010 09:44 AM    Glucose 71 03/09/2020 02:29 PM    BUN 14 03/09/2020 02:29 PM    Creatinine 0.88 03/09/2020 02:29 PM    BUN/Creatinine ratio 16 03/09/2020 02:29 PM    GFR est AA 80 03/09/2020 02:29 PM    GFR est non-AA 69 03/09/2020 02:29 PM    Calcium 9.0 03/09/2020 02:29 PM     Lab Results   Component Value Date/Time    Cholesterol, total 215 (H) 03/26/2018 10:59 AM    HDL Cholesterol 56 03/26/2018 10:59 AM    LDL, calculated 133 (H) 03/26/2018 10:59 AM VLDL, calculated 26 03/26/2018 10:59 AM    Triglyceride 129 03/26/2018 10:59 AM    CHOL/HDL Ratio 4.0 09/08/2010 09:44 AM     Lab Results   Component Value Date/Time    TSH 2.200 03/26/2018 10:59 AM     Lab Results   Component Value Date/Time    Hemoglobin A1c 5.4 08/28/2018 10:25 AM     Lab Results   Component Value Date/Time    VITAMIN D, 25-HYDROXY 25.0 (L) 03/26/2018 10:59 AM                   Assessment and Plan:     1. Essential hypertension  - close to goal   - METABOLIC PANEL, BASIC  - irbesartan (AVAPRO) 150 mg tablet; Take 1 Tab by mouth daily. Dispense: 90 Tab; Refill: 2  - hydroCHLOROthiazide (HYDRODIURIL) 12.5 mg tablet; Take 1 Tab by mouth daily. Dispense: 90 Tab; Refill: 2    2. Sleep apnea, obstructive  On CPAP     3. High cholesterol  Not on medication   - LIPID PANEL    4. Vitamin D deficiency  - VITAMIN D, 25 HYDROXY    5. Medicare annual wellness visit, subsequent    6. Primary osteoarthritis of knee, unspecified laterality - discussed to use infrequently   - meloxicam (MOBIC) 15 mg tablet; Take 1 Tab by mouth as needed for Pain. Dispense: 30 Tab; Refill: 5      Follow-up and Dispositions    · Return in about 6 months (around 1/2/2021) for Hypertension . Maria Esther Booker MD  This is the Subsequent Medicare Annual Wellness Exam, performed 12 months or more after the Initial AWV or the last Subsequent AWV    I have reviewed the patient's medical history in detail and updated the computerized patient record.        Depression Risk Factor Screening:     3 most recent PHQ Screens 7/2/2020   Little interest or pleasure in doing things Not at all   Feeling down, depressed, irritable, or hopeless Not at all   Total Score PHQ 2 0       Alcohol Risk Factor Screening:   Do you average 1 drink per night or more than 7 drinks a week:  No    On any one occasion in the past three months have you have had more than 3 drinks containing alcohol:  No      Functional Ability and Level of Safety: Hearing: Hearing is good. Activities of Daily Living: The home contains: no safety equipment. Patient does total self care     Ambulation: with no difficulty     Fall Risk:  Fall Risk Assessment, last 12 mths 7/2/2020   Able to walk? Yes   Fall in past 12 months? No     Abuse Screen:  Patient is not abused       Cognitive Screening   Has your family/caregiver stated any concerns about your memory: no     Cognitive Screening: Normal - Verbal Fluency Test       Patient Care Team   Patient Care Team:  Eric Zamora MD as PCP - General (Internal Medicine)  Eric Zamora MD [facility] PCP - [facility]  Claudell Dunk, MD as Surgeon (General Surgery)    Assessment/Plan   Education and counseling provided:  Are appropriate based on today's review and evaluation    Diagnoses and all orders for this visit:    1. Essential hypertension  -     METABOLIC PANEL, BASIC  -     irbesartan (AVAPRO) 150 mg tablet; Take 1 Tab by mouth daily. -     hydroCHLOROthiazide (HYDRODIURIL) 12.5 mg tablet; Take 1 Tab by mouth daily. 2. Sleep apnea, obstructive    3. High cholesterol  -     LIPID PANEL    4. Vitamin D deficiency  -     VITAMIN D, 25 HYDROXY    5. Medicare annual wellness visit, subsequent    6. Primary osteoarthritis of knee, unspecified laterality  -     meloxicam (MOBIC) 15 mg tablet; Take 1 Tab by mouth as needed for Pain.     Declined pneumonia vaccine    Health Maintenance Due   Topic Date Due    DTaP/Tdap/Td series (1 - Tdap) 07/13/1975     Lives alone   No issues hearing  No issues with memory

## 2020-07-03 LAB
25(OH)D3+25(OH)D2 SERPL-MCNC: 25.9 NG/ML (ref 30–100)
BUN SERPL-MCNC: 13 MG/DL (ref 8–27)
BUN/CREAT SERPL: 14 (ref 12–28)
CALCIUM SERPL-MCNC: 9.6 MG/DL (ref 8.7–10.3)
CHLORIDE SERPL-SCNC: 102 MMOL/L (ref 96–106)
CHOLEST SERPL-MCNC: 230 MG/DL (ref 100–199)
CO2 SERPL-SCNC: 27 MMOL/L (ref 20–29)
CREAT SERPL-MCNC: 0.9 MG/DL (ref 0.57–1)
GLUCOSE SERPL-MCNC: 105 MG/DL (ref 65–99)
HDLC SERPL-MCNC: 64 MG/DL
LDLC SERPL CALC-MCNC: 145 MG/DL (ref 0–99)
POTASSIUM SERPL-SCNC: 3.9 MMOL/L (ref 3.5–5.2)
SODIUM SERPL-SCNC: 143 MMOL/L (ref 134–144)
TRIGL SERPL-MCNC: 107 MG/DL (ref 0–149)
VLDLC SERPL CALC-MCNC: 21 MG/DL (ref 5–40)

## 2020-07-16 NOTE — PROGRESS NOTES
Vitamin D is low recommend that you take 2000 units of vitamin D3 daily   Cholesterol LDL is moderately high work on low fat diet   Normal kidney function

## 2020-08-21 ENCOUNTER — VIRTUAL VISIT (OUTPATIENT)
Dept: INTERNAL MEDICINE CLINIC | Age: 65
End: 2020-08-21
Payer: MEDICARE

## 2020-08-21 ENCOUNTER — TELEPHONE (OUTPATIENT)
Dept: INTERNAL MEDICINE CLINIC | Age: 65
End: 2020-08-21

## 2020-08-21 DIAGNOSIS — I10 ESSENTIAL HYPERTENSION: Primary | ICD-10-CM

## 2020-08-21 DIAGNOSIS — E66.01 MORBID OBESITY WITH BMI OF 40.0-44.9, ADULT (HCC): ICD-10-CM

## 2020-08-21 DIAGNOSIS — M79.605 PAIN IN BOTH LOWER EXTREMITIES: ICD-10-CM

## 2020-08-21 DIAGNOSIS — M79.604 PAIN IN BOTH LOWER EXTREMITIES: ICD-10-CM

## 2020-08-21 DIAGNOSIS — R53.83 FATIGUE, UNSPECIFIED TYPE: ICD-10-CM

## 2020-08-21 DIAGNOSIS — E55.9 VITAMIN D DEFICIENCY: ICD-10-CM

## 2020-08-21 PROCEDURE — 3017F COLORECTAL CA SCREEN DOC REV: CPT | Performed by: FAMILY MEDICINE

## 2020-08-21 PROCEDURE — 99213 OFFICE O/P EST LOW 20 MIN: CPT | Performed by: FAMILY MEDICINE

## 2020-08-21 PROCEDURE — G8432 DEP SCR NOT DOC, RNG: HCPCS | Performed by: FAMILY MEDICINE

## 2020-08-21 PROCEDURE — 1101F PT FALLS ASSESS-DOCD LE1/YR: CPT | Performed by: FAMILY MEDICINE

## 2020-08-21 PROCEDURE — G8427 DOCREV CUR MEDS BY ELIG CLIN: HCPCS | Performed by: FAMILY MEDICINE

## 2020-08-21 PROCEDURE — 1090F PRES/ABSN URINE INCON ASSESS: CPT | Performed by: FAMILY MEDICINE

## 2020-08-21 PROCEDURE — G8417 CALC BMI ABV UP PARAM F/U: HCPCS | Performed by: FAMILY MEDICINE

## 2020-08-21 PROCEDURE — G8399 PT W/DXA RESULTS DOCUMENT: HCPCS | Performed by: FAMILY MEDICINE

## 2020-08-21 PROCEDURE — G9899 SCRN MAM PERF RSLTS DOC: HCPCS | Performed by: FAMILY MEDICINE

## 2020-08-21 PROCEDURE — G8756 NO BP MEASURE DOC: HCPCS | Performed by: FAMILY MEDICINE

## 2020-08-21 PROCEDURE — G8536 NO DOC ELDER MAL SCRN: HCPCS | Performed by: FAMILY MEDICINE

## 2020-08-21 NOTE — PROGRESS NOTES
Vinh Christian is a 76 y.o. female patient of Dr. Tara Gomez who presents with concern of medication reaction. Taking hctz 12.5mg daily and avapro 150mg daily for HTN. Started on avapro since June. /80. Patient reports symptoms she attributes to  Avapro, hair loss, \"coming out in clumps\". Reports cannot lose weight. Reports loss of appetite. Awakens with leg pain. Fatigue. Neck pain. Vitamin d deficiency. Started on vitamin D 2k daily. This is an established visit conducted via telemedicine with video. The patient has been instructed that this meets HIPAA criteria and acknowledges and agrees to this method of visitation. Pursuant to the emergency declaration under the [facility] waiver authority and the [employer] Act, this Virtual Visit was conducted, with patient's consent, to reduce the patient's risk of exposure to COVID-19 and provide continuity of care for an established patient. Services were provided through a video synchronous discussion virtually to substitute for in-person clinic visit.         Past Medical History:   Diagnosis Date    HTN (hypertension) 1/15/2010    Sleep apnea 1/20/2015    Sleep apnea, obstructive 1/5/2016       Family History   Problem Relation Age of Onset    Cancer Mother     Hypertension Father     Cancer Father     Hypertension Sister     Hypertension Brother        Social History     Socioeconomic History    Marital status: SINGLE     Spouse name: Not on file    Number of children: Not on file    Years of education: Not on file    Highest education level: Not on file   Occupational History    Not on file   Social Needs    Financial resource strain: Not on file    Food insecurity     Worry: Not on file     Inability: Not on file    Transportation needs     Medical: Not on file     Non-medical: Not on file   Tobacco Use    Smoking status: Never Smoker    Smokeless tobacco: Never Used   Substance and Sexual Activity    Alcohol use: Yes     Alcohol/week: 4.2 standard drinks     Types: 5 Glasses of wine per week     Comment: 1 glass per night    Drug use: No    Sexual activity: Yes     Partners: Male     Birth control/protection: Condom   Lifestyle    Physical activity     Days per week: Not on file     Minutes per session: Not on file    Stress: Not on file   Relationships    Social connections     Talks on phone: Not on file     Gets together: Not on file     Attends Jain service: Not on file     Active member of club or organization: Not on file     Attends meetings of clubs or organizations: Not on file     Relationship status: Not on file    Intimate partner violence     Fear of current or ex partner: Not on file     Emotionally abused: Not on file     Physically abused: Not on file     Forced sexual activity: Not on file   Other Topics Concern    Not on file   Social History Narrative    Not on file       Current Outpatient Medications on File Prior to Visit   Medication Sig Dispense Refill    meloxicam (MOBIC) 15 mg tablet Take 1 Tab by mouth as needed for Pain. 30 Tab 5    irbesartan (AVAPRO) 150 mg tablet Take 1 Tab by mouth daily. 90 Tab 2    hydroCHLOROthiazide (HYDRODIURIL) 12.5 mg tablet Take 1 Tab by mouth daily. 90 Tab 2    YUVAFEM 10 mcg tab vaginal tablet Insert 10 mcg into vagina.  multivitamin (ONE A DAY) tablet Take 1 tablet by mouth daily.  sodium hyaluronate (SUPARTZ FX/EUFLEXXA/HYALGAN) 10 mg/mL syrg injection 20 mg by Intra artICUlar route as needed.  Comp. Stocking,Knee,Regular,Lrg misc 20-30mmHG 1 Units 2     No current facility-administered medications on file prior to visit. Review of Systems  Pertinent items are noted in HPI.     Objective:     Gen: well appearing female  HEENT: normal conjunctiva, no audible congestion, patient does not see oral erythema, has MMM  Neck: patient does not feel enlarged or tender LAD or masses  Resp: normal respiratory effort, no audible wheezing. CV: patient does not feel palpitations or heart irregularity  Abd: patient does not feel abdominal tenderness or mass, patient does not notice distension  Extrem: patient does not see swelling in ankles or joints. Neuro: Alert and oriented, able to answer questions without difficulty      Assessment/Plan:       ICD-10-CM ICD-9-CM    1. Essential hypertension  I10 401.9    2. Morbid obesity with BMI of 40.0-44.9, adult ([facility]  E66.01 278.01     Z68.41 V85.41    3. Vitamin D deficiency  E55.9 268.9    4. Pain in both lower extremities  M79.604 729.5     M79.605     5. Fatigue, unspecified type  R53.83 780.79      Vitamin D increase to 4k daily for one month then reduce back to 2k daily. Increase water 6-8 glasses a day. Check BP at home. Low salt diet. Continue present BP medications. This was a telemedicine visit with video. Jason Ribeiro MD    Follow-up and Dispositions    · Return if symptoms worsen or fail to improve.

## 2020-08-21 NOTE — TELEPHONE ENCOUNTER
Spoke with patient. Two pt identifiers confirmed. Patient offered an appointment with Dr. Anna Telles today, 08/21/2020. Appointment accepted. Patient advised if anything changes or if unable to keep this appointment to call the office  Pt verbalized understanding of information discussed w/ no further questions at this time.

## 2020-08-21 NOTE — TELEPHONE ENCOUNTER
Pt had appt today w appa, states she is having reaction from new bp meds    Weight gain  Eye pain  Leg pain  Restless legs  Joint pain  Neck stiffness  Loss of appetite  Exhaustion  Depression  Chilly  Dry skin and peeling on legs    [phone]

## 2020-10-16 DIAGNOSIS — I10 ESSENTIAL HYPERTENSION: ICD-10-CM

## 2020-10-16 RX ORDER — FUROSEMIDE 20 MG/1
TABLET ORAL
Qty: 30 TAB | Refills: 1 | Status: SHIPPED | OUTPATIENT
Start: 2020-10-16 | End: 2020-11-12

## 2020-11-12 DIAGNOSIS — I10 ESSENTIAL HYPERTENSION: ICD-10-CM

## 2020-11-12 RX ORDER — FUROSEMIDE 20 MG/1
TABLET ORAL
Qty: 30 TAB | Refills: 1 | Status: SHIPPED | OUTPATIENT
Start: 2020-11-12 | End: 2021-09-13 | Stop reason: SDUPTHER

## 2020-12-04 ENCOUNTER — TELEPHONE (OUTPATIENT)
Dept: INTERNAL MEDICINE CLINIC | Age: 65
End: 2020-12-04

## 2020-12-04 ENCOUNTER — VIRTUAL VISIT (OUTPATIENT)
Dept: INTERNAL MEDICINE CLINIC | Age: 65
End: 2020-12-04
Payer: MEDICARE

## 2020-12-04 DIAGNOSIS — I10 ESSENTIAL HYPERTENSION: ICD-10-CM

## 2020-12-04 DIAGNOSIS — B02.8 HERPES ZOSTER WITH OTHER COMPLICATION: Primary | ICD-10-CM

## 2020-12-04 PROCEDURE — G8756 NO BP MEASURE DOC: HCPCS | Performed by: INTERNAL MEDICINE

## 2020-12-04 PROCEDURE — G9899 SCRN MAM PERF RSLTS DOC: HCPCS | Performed by: INTERNAL MEDICINE

## 2020-12-04 PROCEDURE — 99213 OFFICE O/P EST LOW 20 MIN: CPT | Performed by: INTERNAL MEDICINE

## 2020-12-04 PROCEDURE — G8399 PT W/DXA RESULTS DOCUMENT: HCPCS | Performed by: INTERNAL MEDICINE

## 2020-12-04 PROCEDURE — 3017F COLORECTAL CA SCREEN DOC REV: CPT | Performed by: INTERNAL MEDICINE

## 2020-12-04 PROCEDURE — 1090F PRES/ABSN URINE INCON ASSESS: CPT | Performed by: INTERNAL MEDICINE

## 2020-12-04 PROCEDURE — 1101F PT FALLS ASSESS-DOCD LE1/YR: CPT | Performed by: INTERNAL MEDICINE

## 2020-12-04 PROCEDURE — G8427 DOCREV CUR MEDS BY ELIG CLIN: HCPCS | Performed by: INTERNAL MEDICINE

## 2020-12-04 PROCEDURE — G8432 DEP SCR NOT DOC, RNG: HCPCS | Performed by: INTERNAL MEDICINE

## 2020-12-04 RX ORDER — GABAPENTIN 100 MG/1
100 CAPSULE ORAL 2 TIMES DAILY
Qty: 60 CAP | Refills: 0 | Status: SHIPPED | OUTPATIENT
Start: 2020-12-04 | End: 2022-04-14 | Stop reason: ALTCHOICE

## 2020-12-04 RX ORDER — VALACYCLOVIR HYDROCHLORIDE 1 G/1
1000 TABLET, FILM COATED ORAL 3 TIMES DAILY
Qty: 21 TAB | Refills: 0 | Status: SHIPPED | OUTPATIENT
Start: 2020-12-04 | End: 2020-12-11

## 2020-12-04 NOTE — PROGRESS NOTES
HISTORY OF PRESENT ILLNESS  Yamileth Collins is a 76 y.o. female. HPI     Pt normally follows with Dr Piter Pettit. Pt is here for acute care. This is an established visit completed with telemedicine was completed with video assist  the patient acknowledges and agrees to this method of visitation doxyme    She c/o shingles   Endorses itching, burning since Saturday 11/28/20, the rash appeared yesterday 12/3/20  She has patches on R back shoulder and under her R arm  The patch under her R arm is not as blistery  There is nothing oozing out of it  On exam: couple vesicular lesions in two erythematous patches on R back shoulder, erythematous patch under R axilla   Denies fever  Endorses chills  She has had a sharp pain in chest wall since she got this rash   Will give valtrex   Will give gabapentin for nerve pain prn up to BID   Discussed she can not be around pregnant women or infants due to risk of chicken pox    BP at home 163/81   She takes hctz 12.5 mg  Discussed continue monitoring and nl BP ranges  Discussed f/u if BP stays elevated    Patient Active Problem List    Diagnosis Date Noted    Morbid obesity with body mass index of 45.0-49.9 in adult [facility]) 09/28/2018    Cervical radicular pain 02/29/2016    Right arm pain 02/29/2016    Sleep apnea, obstructive 01/05/2016    Essential hypertension 06/05/2015    Bilateral leg edema 06/05/2015    Myalgia 06/05/2015    Irregular heart beat 10/14/2014    Hyperlipidemia LDL goal < 100 07/15/2014    Easy bruising 07/15/2014     Current Outpatient Medications   Medication Sig Dispense Refill    valACYclovir (VALTREX) 1 gram tablet Take 1 Tab by mouth three (3) times daily for 7 days. 21 Tab 0    gabapentin (NEURONTIN) 100 mg capsule Take 1 Cap by mouth two (2) times a day.  Max Daily Amount: 200 mg. 60 Cap 0    furosemide (LASIX) 20 mg tablet TAKE 1 TABLET BY MOUTH EVERY DAY AS NEEDED FOR SWELLING 30 Tab 1    meloxicam (MOBIC) 15 mg tablet Take 1 Tab by mouth as needed for Pain. 30 Tab 5    irbesartan (AVAPRO) 150 mg tablet Take 1 Tab by mouth daily. 90 Tab 2    hydroCHLOROthiazide (HYDRODIURIL) 12.5 mg tablet Take 1 Tab by mouth daily. 90 Tab 2    sodium hyaluronate (SUPARTZ FX/EUFLEXXA/HYALGAN) 10 mg/mL syrg injection 20 mg by Intra artICUlar route as needed.  YUVAFEM 10 mcg tab vaginal tablet Insert 10 mcg into vagina.  multivitamin (ONE A DAY) tablet Take 1 tablet by mouth daily.  Comp. Stocking,Knee,Regular,Lrg misc 20-30mmHG 1 Units 2     Past Surgical History:   Procedure Laterality Date    HX APPENDECTOMY      HX HYSTERECTOMY  approx 1987 2/2 uterine fibroids    HX OTHER SURGICAL  2002    hiatal hernia repair       Lab Results   Component Value Date/Time    WBC 6.7 03/09/2020 02:29 PM    HGB 14.0 03/09/2020 02:29 PM    HCT 43.0 03/09/2020 02:29 PM    PLATELET 000 29/36/6946 02:29 PM    MCV 89 03/09/2020 02:29 PM     Lab Results   Component Value Date/Time    Cholesterol, total 230 (H) 07/02/2020 11:22 AM    HDL Cholesterol 64 07/02/2020 11:22 AM    LDL, calculated 145 (H) 07/02/2020 11:22 AM    Triglyceride 107 07/02/2020 11:22 AM    CHOL/HDL Ratio 4.0 09/08/2010 09:44 AM     Lab Results   Component Value Date/Time    GFR est non-AA 67 07/02/2020 11:22 AM    GFR est AA 78 07/02/2020 11:22 AM    Creatinine 0.90 07/02/2020 11:22 AM    BUN 13 07/02/2020 11:22 AM    Sodium 143 07/02/2020 11:22 AM    Potassium 3.9 07/02/2020 11:22 AM    Chloride 102 07/02/2020 11:22 AM    CO2 27 07/02/2020 11:22 AM        Review of Systems   Constitutional: Positive for chills. Negative for fever. Respiratory: Negative for shortness of breath. Cardiovascular: Negative for chest pain. Skin: Positive for itching and rash. Physical Exam  Constitutional:       General: She is not in acute distress. Appearance: Normal appearance. She is not ill-appearing, toxic-appearing or diaphoretic. HENT:      Head: Normocephalic and atraumatic.    Eyes: General:         Right eye: No discharge. Left eye: No discharge. Conjunctiva/sclera: Conjunctivae normal.   Pulmonary:      Effort: No respiratory distress. Skin:     Findings: Erythema, lesion and rash present. Comments: couple vesicular lesions in two erythematous patches on R back shoulder, erythematous patch under R axilla    Neurological:      Mental Status: She is alert and oriented to person, place, and time. Mental status is at baseline. Gait: Gait normal.   Psychiatric:         Mood and Affect: Mood normal.         Behavior: Behavior normal.         ASSESSMENT and PLAN    ICD-10-CM ICD-9-CM    1. Herpes zoster with other complication      We will treat with Valtrex 1 g 3 times daily for 7 days    Gabapentin as needed for pain    Discussed contact precautions regarding people who have not had chickenpox or who are not immunized against chickenpox to include the young under 11years of age and pregnant woman      B02.8 053.79 gabapentin (NEURONTIN) 100 mg capsule   2. Essential hypertension --patient reports poor control at home discussed blood pressure goals she will start monitoring her blood pressure more consistently and if elevation persists she will schedule follow-up appointment I10 401.9            Scribed by Jennifer Junior of [address] [place] [address] [address], as dictated by Dr. Addi Fonseca. Current diagnosis and concerns discussed with pt at length. Pt understands risks and benefits or current treatment plan and medications, and accepts the treatment and medication with any possible risks. Pt asks appropriate questions, which were answered. Pt was instructed to call with any concerns or problems. I have reviewed the note documented by the scribe. The services provided are my own.   The documentation is accurate     Juan Pablo Dumont, who was evaluated through a synchronous (real-time) audio-video encounter, and/or her healthcare decision maker, is aware that it is a billable service, with coverage as determined by her insurance carrier. She provided verbal consent to proceed: Yes, and patient identification was verified. It was conducted pursuant to the emergency declaration under the [address] [place] [address] authority and the [employer] General Act. A caregiver was present when appropriate. Ability to conduct physical exam was limited. I was at home. The patient was at home.

## 2020-12-04 NOTE — TELEPHONE ENCOUNTER
Spoke with patient. Two pt identifiers confirmed. Patient advised that Dr. Citlali Belle can see her today for a virtual visit art 9am.  Appointment accepted. Pt verbalized understanding of information discussed w/ no further questions at this time.    Patient advised if anything changes or if unable to keep this appointment to call the office

## 2020-12-04 NOTE — TELEPHONE ENCOUNTER
#[phone]   Pt states she has a rash on the right side of back and under her arm. Pt believes this is shingles. Pt does have pictures. I could not find a VV or otherwise. Please call pt to schedule or advise.

## 2020-12-04 NOTE — TELEPHONE ENCOUNTER
The following prescription was called into pt's [facility]:    gabapentin (NEURONTIN) 100 mg capsule [[id number]]     Order Details   Dose: 100 mg  Route: Oral  Frequency: 2 TIMES DAILY    Dispense Quantity: 60 Cap  Refills: 0  Fills remaining: --             Sig: Take 1 Cap by mouth two (2) times a day.  Max Daily Amount: 200 mg.            Written Date: 12/04/20  Expiration Date: --      Start Date: 12/04/20  End Date: --              Ordering Provider: --  SALONI #:  --  NPI:  --     Authorizing Provider: Azzie Pallas, MD  SALONI #:  [id number]  NPI:  [id number]     Ordering User:  Azzie Pallas, MD             Diagnosis Association: Herpes zoster with other complication (A70.3)

## 2020-12-04 NOTE — TELEPHONE ENCOUNTER
Sending message to [facility] as pt saw Dr. Collette Sol today. Pt states [facility] on [facility] does not have the gabapentin and won't for several days. Being this is a new script they can not transfer this. Please call in the gabapentin to [facility] on [phone]

## 2020-12-10 ENCOUNTER — TELEPHONE (OUTPATIENT)
Dept: INTERNAL MEDICINE CLINIC | Age: 65
End: 2020-12-10

## 2020-12-10 NOTE — TELEPHONE ENCOUNTER
General Message/Vendor Calls     Caller's first and last name: N/A       Reason for call: Shingles       Callback required yes/no and why: Yes,Clarification       Best contact number(s): [phone]       Details to clarify the request: Pt stated Rx given for shingles pain is not working. Wanted to know if she can take Tylenol for pain. Pt hasn't been able to sleep and is very emotional on the phone due to pain.  Please call as soon as possible per pt request.       Shara Hernandez

## 2020-12-10 NOTE — TELEPHONE ENCOUNTER
MD Jeniffer Fields LPN    Caller: Unspecified (Today, 11:29 AM)               Can use tylenol     She can also double the dose of gabapentin to 200mg bid      Spoke with patient. Two pt identifiers confirmed. Patient advised of the above recommendations from Dr. Héctor Arriola. Pt verbalized understanding of information discussed w/ no further questions at this time.

## 2020-12-15 DIAGNOSIS — M17.10 PRIMARY OSTEOARTHRITIS OF KNEE, UNSPECIFIED LATERALITY: ICD-10-CM

## 2020-12-15 RX ORDER — MELOXICAM 15 MG/1
TABLET ORAL
Qty: 30 TAB | Refills: 0 | Status: SHIPPED | OUTPATIENT
Start: 2020-12-15 | End: 2021-01-18

## 2021-01-16 DIAGNOSIS — M17.10 PRIMARY OSTEOARTHRITIS OF KNEE, UNSPECIFIED LATERALITY: ICD-10-CM

## 2021-01-18 RX ORDER — MELOXICAM 15 MG/1
TABLET ORAL
Qty: 30 TAB | Refills: 0 | Status: SHIPPED | OUTPATIENT
Start: 2021-01-18 | End: 2021-02-14

## 2021-02-13 DIAGNOSIS — M17.10 PRIMARY OSTEOARTHRITIS OF KNEE, UNSPECIFIED LATERALITY: ICD-10-CM

## 2021-02-14 RX ORDER — MELOXICAM 15 MG/1
TABLET ORAL
Qty: 30 TAB | Refills: 0 | Status: SHIPPED | OUTPATIENT
Start: 2021-02-14 | End: 2021-03-17

## 2021-03-17 DIAGNOSIS — M17.10 PRIMARY OSTEOARTHRITIS OF KNEE, UNSPECIFIED LATERALITY: ICD-10-CM

## 2021-03-17 RX ORDER — MELOXICAM 15 MG/1
TABLET ORAL
Qty: 30 TAB | Refills: 0 | Status: SHIPPED | OUTPATIENT
Start: 2021-03-17 | End: 2021-04-14

## 2021-03-28 DIAGNOSIS — I10 ESSENTIAL HYPERTENSION: ICD-10-CM

## 2021-03-28 RX ORDER — IRBESARTAN 150 MG/1
TABLET ORAL
Qty: 90 TAB | Refills: 2 | Status: SHIPPED | OUTPATIENT
Start: 2021-03-28 | End: 2021-08-12 | Stop reason: DRUGHIGH

## 2021-04-14 DIAGNOSIS — M17.10 PRIMARY OSTEOARTHRITIS OF KNEE, UNSPECIFIED LATERALITY: ICD-10-CM

## 2021-04-14 RX ORDER — MELOXICAM 15 MG/1
TABLET ORAL
Qty: 30 TAB | Refills: 0 | Status: SHIPPED | OUTPATIENT
Start: 2021-04-14 | End: 2021-05-12

## 2021-04-19 DIAGNOSIS — I10 ESSENTIAL HYPERTENSION: ICD-10-CM

## 2021-04-19 RX ORDER — HYDROCHLOROTHIAZIDE 12.5 MG/1
TABLET ORAL
Qty: 90 TAB | Refills: 2 | Status: SHIPPED | OUTPATIENT
Start: 2021-04-19 | End: 2022-01-17

## 2021-05-12 DIAGNOSIS — M17.10 PRIMARY OSTEOARTHRITIS OF KNEE, UNSPECIFIED LATERALITY: ICD-10-CM

## 2021-05-12 RX ORDER — MELOXICAM 15 MG/1
TABLET ORAL
Qty: 30 TAB | Refills: 0 | Status: SHIPPED | OUTPATIENT
Start: 2021-05-12 | End: 2021-06-16

## 2021-06-16 DIAGNOSIS — M17.10 PRIMARY OSTEOARTHRITIS OF KNEE, UNSPECIFIED LATERALITY: ICD-10-CM

## 2021-06-16 RX ORDER — MELOXICAM 15 MG/1
TABLET ORAL
Qty: 30 TABLET | Refills: 0 | Status: SHIPPED | OUTPATIENT
Start: 2021-06-16 | End: 2021-07-12

## 2021-07-11 DIAGNOSIS — M17.10 PRIMARY OSTEOARTHRITIS OF KNEE, UNSPECIFIED LATERALITY: ICD-10-CM

## 2021-07-12 RX ORDER — MELOXICAM 15 MG/1
TABLET ORAL
Qty: 30 TABLET | Refills: 0 | Status: SHIPPED | OUTPATIENT
Start: 2021-07-12 | End: 2021-08-16

## 2021-08-12 ENCOUNTER — VIRTUAL VISIT (OUTPATIENT)
Dept: INTERNAL MEDICINE CLINIC | Age: 66
End: 2021-08-12
Payer: MEDICARE

## 2021-08-12 DIAGNOSIS — E78.00 HIGH CHOLESTEROL: ICD-10-CM

## 2021-08-12 DIAGNOSIS — E55.9 VITAMIN D DEFICIENCY: ICD-10-CM

## 2021-08-12 DIAGNOSIS — I10 ESSENTIAL HYPERTENSION: Primary | ICD-10-CM

## 2021-08-12 DIAGNOSIS — E66.01 MORBID OBESITY WITH BMI OF 40.0-44.9, ADULT (HCC): ICD-10-CM

## 2021-08-12 DIAGNOSIS — M17.10 PRIMARY OSTEOARTHRITIS OF KNEE, UNSPECIFIED LATERALITY: ICD-10-CM

## 2021-08-12 PROCEDURE — G8432 DEP SCR NOT DOC, RNG: HCPCS | Performed by: INTERNAL MEDICINE

## 2021-08-12 PROCEDURE — G8536 NO DOC ELDER MAL SCRN: HCPCS | Performed by: INTERNAL MEDICINE

## 2021-08-12 PROCEDURE — G9899 SCRN MAM PERF RSLTS DOC: HCPCS | Performed by: INTERNAL MEDICINE

## 2021-08-12 PROCEDURE — 99214 OFFICE O/P EST MOD 30 MIN: CPT | Performed by: INTERNAL MEDICINE

## 2021-08-12 PROCEDURE — G8427 DOCREV CUR MEDS BY ELIG CLIN: HCPCS | Performed by: INTERNAL MEDICINE

## 2021-08-12 PROCEDURE — G8756 NO BP MEASURE DOC: HCPCS | Performed by: INTERNAL MEDICINE

## 2021-08-12 PROCEDURE — 1090F PRES/ABSN URINE INCON ASSESS: CPT | Performed by: INTERNAL MEDICINE

## 2021-08-12 PROCEDURE — 1101F PT FALLS ASSESS-DOCD LE1/YR: CPT | Performed by: INTERNAL MEDICINE

## 2021-08-12 PROCEDURE — 3017F COLORECTAL CA SCREEN DOC REV: CPT | Performed by: INTERNAL MEDICINE

## 2021-08-12 PROCEDURE — G8421 BMI NOT CALCULATED: HCPCS | Performed by: INTERNAL MEDICINE

## 2021-08-12 PROCEDURE — G8399 PT W/DXA RESULTS DOCUMENT: HCPCS | Performed by: INTERNAL MEDICINE

## 2021-08-12 RX ORDER — IRBESARTAN 300 MG/1
300 TABLET ORAL
Qty: 30 TABLET | Refills: 1 | Status: SHIPPED | OUTPATIENT
Start: 2021-08-12 | End: 2021-09-13 | Stop reason: SDUPTHER

## 2021-08-12 RX ORDER — IRBESARTAN 300 MG/1
300 TABLET ORAL
Qty: 90 TABLET | Refills: 1 | Status: SHIPPED | OUTPATIENT
Start: 2021-08-12 | End: 2021-08-12 | Stop reason: SDUPTHER

## 2021-08-12 NOTE — PROGRESS NOTES
CC: Medication Evaluation  HTN    HPI:    She is a 78 y.o. female who presents for evaluation of   Chronic medical issues      1. Essential hypertension:  current therapy  - irbesartan (AVAPRO) 150 mg tablet; Take 1 Tab by mouth daily.    - HCTZ 12.5mg  Reports BP has been higher bottom number in the 61J   Systolic 392  Denies chest pain and dyspnea  Denies dizziness and claudication  At times swelling in legs     2. Edema, unspecified type  -  Improved     3. SUDEEP: compliant with CPAP        This is an established visit conducted via telemedicine with video. The patient has been instructed that this meets HIPAA criteria and acknowledges and agrees to this method of visitation. Pursuant to the emergency declaration under the [facility], [facility] waiver authority and the [employer] Act, this Virtual Visit was conducted, with patient's consent, to reduce the patient's risk of exposure to COVID-19 and provide continuity of care for an established patient. Services were provided through a video synchronous discussion virtually to substitute for in-person clinic visit. ROS:  Constitutional: negative for fevers, chills, anorexia and weight loss  10     Past Medical History:   Diagnosis Date    HTN (hypertension) 1/15/2010    Sleep apnea 1/20/2015    Sleep apnea, obstructive 1/5/2016       Current Outpatient Medications on File Prior to Visit   Medication Sig Dispense Refill    meloxicam (MOBIC) 15 mg tablet TAKE 1 TABLET BY MOUTH EVERY DAY AS NEEDED FOR PAIN 30 Tablet 0    hydroCHLOROthiazide (HYDRODIURIL) 12.5 mg tablet TAKE 1 TABLET BY MOUTH EVERY DAY 90 Tab 2    furosemide (LASIX) 20 mg tablet TAKE 1 TABLET BY MOUTH EVERY DAY AS NEEDED FOR SWELLING 30 Tab 1    sodium hyaluronate (SUPARTZ FX/EUFLEXXA/HYALGAN) 10 mg/mL syrg injection 20 mg by Intra artICUlar route as needed.       YUVAFEM 10 mcg tab vaginal tablet Insert 10 mcg into vagina.  Comp. Stocking,Knee,Regular,Lrg misc 20-30mmHG 1 Units 2    multivitamin (ONE A DAY) tablet Take 1 tablet by mouth daily.  gabapentin (NEURONTIN) 100 mg capsule Take 1 Cap by mouth two (2) times a day. Max Daily Amount: 200 mg. (Patient not taking: Reported on 8/12/2021) 60 Cap 0     No current facility-administered medications on file prior to visit. Past Surgical History:   Procedure Laterality Date    HX APPENDECTOMY      HX HYSTERECTOMY  approx 1987 2/2 uterine fibroids    HX OTHER SURGICAL  2002    hiatal hernia repair        Family History   Problem Relation Age of Onset    Cancer Mother     Hypertension Father     Cancer Father     Hypertension Sister     Hypertension Brother      Reviewed and no changes     Social History     Socioeconomic History    Marital status: SINGLE     Spouse name: Not on file    Number of children: Not on file    Years of education: Not on file    Highest education level: Not on file   Occupational History    Not on file   Tobacco Use    Smoking status: Never Smoker    Smokeless tobacco: Never Used   Vaping Use    Vaping Use: Never used   Substance and Sexual Activity    Alcohol use: Yes     Alcohol/week: 4.2 standard drinks     Types: 5 Glasses of wine per week     Comment: 1 glass per night    Drug use: No    Sexual activity: Yes     Partners: Male     Birth control/protection: Condom   Other Topics Concern    Not on file   Social History Narrative    Not on file     Social Determinants of Health     Financial Resource Strain:     Difficulty of Paying Living Expenses:    Food Insecurity:     Worried About Running Out of Food in the Last Year:     Ran Out of Food in the Last Year:    Transportation Needs:     Lack of Transportation (Medical):      Lack of Transportation (Non-Medical):    Physical Activity:     Days of Exercise per Week:     Minutes of Exercise per Session:    Stress:     Feeling of Stress : Social Connections:     Frequency of Communication with Friends and Family:     Frequency of Social Gatherings with Friends and Family:     Attends [employer] Services:     Active Member of Clubs or Organizations:     Attends Club or Organization Meetings:     Marital Status:    Intimate Partner Violence:     Fear of Current or Ex-Partner:     Emotionally Abused:     Physically Abused:     Sexually Abused: There were no vitals taken for this visit. Physical Examination:   Gen: well appearing female  HEENT: normal conjunctiva, no audible congestion, patient does not see oral erythema, has MMM  Neck: patient does not feel enlarged or tender LAD or masses  Resp: normal respiratory effort, no audible wheezing. CV: patient does not feel palpitations or heart irregularity  Abd: patient does not feel abdominal tenderness or mass, patient does not notice distension  Extrem: patient does not see swelling in ankles or joints.    Neuro: Alert and oriented, able to answer questions without difficulty, able to move all extremities and walk normally          Lab Results   Component Value Date/Time    WBC 6.7 03/09/2020 02:29 PM    HGB 14.0 03/09/2020 02:29 PM    HCT 43.0 03/09/2020 02:29 PM    PLATELET 959 42/25/4806 02:29 PM    MCV 89 03/09/2020 02:29 PM     Lab Results   Component Value Date/Time    Sodium 143 07/02/2020 11:22 AM    Potassium 3.9 07/02/2020 11:22 AM    Chloride 102 07/02/2020 11:22 AM    CO2 27 07/02/2020 11:22 AM    Anion gap 11 09/08/2010 09:44 AM    Glucose 105 (H) 07/02/2020 11:22 AM    BUN 13 07/02/2020 11:22 AM    Creatinine 0.90 07/02/2020 11:22 AM    BUN/Creatinine ratio 14 07/02/2020 11:22 AM    GFR est AA 78 07/02/2020 11:22 AM    GFR est non-AA 67 07/02/2020 11:22 AM    Calcium 9.6 07/02/2020 11:22 AM     Lab Results   Component Value Date/Time    Cholesterol, total 230 (H) 07/02/2020 11:22 AM    HDL Cholesterol 64 07/02/2020 11:22 AM    LDL, calculated 145 (H) 07/02/2020 11:22 AM    VLDL, calculated 21 07/02/2020 11:22 AM    Triglyceride 107 07/02/2020 11:22 AM    CHOL/HDL Ratio 4.0 09/08/2010 09:44 AM     Lab Results   Component Value Date/Time    TSH 2.200 03/26/2018 10:59 AM     No results found for: PSA, PSA2, Darren Suresh, [id number], [id number]  Lab Results   Component Value Date/Time    Hemoglobin A1c 5.4 08/28/2018 10:25 AM     Lab Results   Component Value Date/Time    VITAMIN D, 25-HYDROXY 25.9 (L) 07/02/2020 11:22 AM       Lab Results   Component Value Date/Time    ALT (SGPT) 12 11/27/2018 09:45 AM    Alk. phosphatase 88 11/27/2018 09:45 AM    Bilirubin, total 0.4 11/27/2018 09:45 AM           Assessment/Plan:    1. Essential hypertension  Reports elevated on current regimen increase avapro   Follow up September 13 th at 8: 30 AM   - CBC WITH AUTOMATED DIFF; Future  - METABOLIC PANEL, COMPREHENSIVE; Future  - irbesartan (AVAPRO) 300 mg tablet; Take 1 Tablet by mouth nightly. Dispense: 30 Tablet; Refill: 1    2. Primary osteoarthritis of knee, unspecified laterality  Working on weight loss to improve this     3. Morbid obesity with BMI of 40.0-44.9, adult ([address]  Reports loosing intentional weight    4. Vitamin D deficiency  - VITAMIN D, 25 HYDROXY; Future    5. High cholesterol  Lifestyle changes   - LIPID PANEL; Future        Follow-up and Dispositions    · Return in about 4 weeks (around 9/9/2021). Winsome Reid MD    This is an established visit conducted via real time video and audio telemedicine. The patient has been instructed that this meets HIPAA criteria and acknowledges and agrees to this method of visitation.

## 2021-08-15 DIAGNOSIS — M17.10 PRIMARY OSTEOARTHRITIS OF KNEE, UNSPECIFIED LATERALITY: ICD-10-CM

## 2021-08-16 RX ORDER — MELOXICAM 15 MG/1
TABLET ORAL
Qty: 30 TABLET | Refills: 0 | Status: SHIPPED | OUTPATIENT
Start: 2021-08-16 | End: 2021-09-27

## 2021-09-13 ENCOUNTER — OFFICE VISIT (OUTPATIENT)
Dept: INTERNAL MEDICINE CLINIC | Age: 66
End: 2021-09-13
Payer: MEDICARE

## 2021-09-13 VITALS
TEMPERATURE: 97 F | DIASTOLIC BLOOD PRESSURE: 87 MMHG | RESPIRATION RATE: 16 BRPM | WEIGHT: 233 LBS | BODY MASS INDEX: 45.75 KG/M2 | OXYGEN SATURATION: 96 % | HEIGHT: 60 IN | HEART RATE: 70 BPM | SYSTOLIC BLOOD PRESSURE: 179 MMHG

## 2021-09-13 DIAGNOSIS — I10 ESSENTIAL HYPERTENSION: ICD-10-CM

## 2021-09-13 DIAGNOSIS — E66.01 MORBID OBESITY WITH BMI OF 40.0-44.9, ADULT (HCC): Primary | ICD-10-CM

## 2021-09-13 DIAGNOSIS — Z00.00 MEDICARE ANNUAL WELLNESS VISIT, SUBSEQUENT: ICD-10-CM

## 2021-09-13 DIAGNOSIS — M17.10 PRIMARY OSTEOARTHRITIS OF KNEE, UNSPECIFIED LATERALITY: ICD-10-CM

## 2021-09-13 PROCEDURE — G8536 NO DOC ELDER MAL SCRN: HCPCS | Performed by: INTERNAL MEDICINE

## 2021-09-13 PROCEDURE — 1101F PT FALLS ASSESS-DOCD LE1/YR: CPT | Performed by: INTERNAL MEDICINE

## 2021-09-13 PROCEDURE — 3017F COLORECTAL CA SCREEN DOC REV: CPT | Performed by: INTERNAL MEDICINE

## 2021-09-13 PROCEDURE — G8417 CALC BMI ABV UP PARAM F/U: HCPCS | Performed by: INTERNAL MEDICINE

## 2021-09-13 PROCEDURE — G8754 DIAS BP LESS 90: HCPCS | Performed by: INTERNAL MEDICINE

## 2021-09-13 PROCEDURE — G8510 SCR DEP NEG, NO PLAN REQD: HCPCS | Performed by: INTERNAL MEDICINE

## 2021-09-13 PROCEDURE — G0439 PPPS, SUBSEQ VISIT: HCPCS | Performed by: INTERNAL MEDICINE

## 2021-09-13 PROCEDURE — G9899 SCRN MAM PERF RSLTS DOC: HCPCS | Performed by: INTERNAL MEDICINE

## 2021-09-13 PROCEDURE — G8399 PT W/DXA RESULTS DOCUMENT: HCPCS | Performed by: INTERNAL MEDICINE

## 2021-09-13 PROCEDURE — G8427 DOCREV CUR MEDS BY ELIG CLIN: HCPCS | Performed by: INTERNAL MEDICINE

## 2021-09-13 PROCEDURE — G8753 SYS BP > OR = 140: HCPCS | Performed by: INTERNAL MEDICINE

## 2021-09-13 RX ORDER — FUROSEMIDE 20 MG/1
20 TABLET ORAL
Qty: 30 TABLET | Refills: 1 | Status: SHIPPED | OUTPATIENT
Start: 2021-09-13 | End: 2021-10-08

## 2021-09-13 RX ORDER — IRBESARTAN 300 MG/1
300 TABLET ORAL
Qty: 90 TABLET | Refills: 1 | Status: SHIPPED | OUTPATIENT
Start: 2021-09-13 | End: 2022-05-26

## 2021-09-13 NOTE — PATIENT INSTRUCTIONS
Increase vitamin D intake to 1000 units daily     Blood pressure is high continue to monitor and if persists elevated will need to increase the hydrochlorothiazide  I sent in lasix to help with fluids    Try to get in the pool for regular exercise     Medicare Wellness Visit, Female     The best way to live healthy is to have a lifestyle where you eat a well-balanced diet, exercise regularly, limit alcohol use, and quit all forms of tobacco/nicotine, if applicable. Regular preventive services are another way to keep healthy. Preventive services (vaccines, screening tests, monitoring & exams) can help personalize your care plan, which helps you manage your own care. Screening tests can find health problems at the earliest stages, when they are easiest to treat. [facility] follows the current, evidence-based guidelines published by the [facility] Tevin Moody ([facility]) when recommending preventive services for our patients. Because we follow these guidelines, sometimes recommendations change over time as research supports it. (For example, mammograms used to be recommended annually. Even though Medicare will still pay for an annual mammogram, the newer guidelines recommend a mammogram every two years for women of average risk). Of course, you and your doctor may decide to screen more often for some diseases, based on your risk and your co-morbidities (chronic disease you are already diagnosed with). Preventive services for you include:  - Medicare offers their members a free annual wellness visit, which is time for you and your primary care provider to discuss and plan for your preventive service needs. Take advantage of this benefit every year!  -All adults over the age of 72 should receive the recommended pneumonia vaccines.  Current USPSTF guidelines recommend a series of two vaccines for the best pneumonia protection.   -All adults should have a flu vaccine yearly and a tetanus vaccine every 10 years.   -All adults age 48 and older should receive the shingles vaccines (series of two vaccines). -All adults age 38-68 who are overweight should have a diabetes screening test once every three years.   -All adults born between [birth year] and 1965 should be screened once for Hepatitis C.  -Other screening tests and preventive services for persons with diabetes include: an eye exam to screen for diabetic retinopathy, a kidney function test, a foot exam, and stricter control over your cholesterol.   -Cardiovascular screening for adults with routine risk involves an electrocardiogram (ECG) at intervals determined by your doctor.   -Colorectal cancer screenings should be done for adults age 54-65 with no increased risk factors for colorectal cancer. There are a number of acceptable methods of screening for this type of cancer. Each test has its own benefits and drawbacks. Discuss with your doctor what is most appropriate for you during your annual wellness visit. The different tests include: colonoscopy (considered the best screening method), a fecal occult blood test, a fecal DNA test, and sigmoidoscopy.    -A bone mass density test is recommended when a woman turns 65 to screen for osteoporosis. This test is only recommended one time, as a screening. Some providers will use this same test as a disease monitoring tool if you already have osteoporosis. -Breast cancer screenings are recommended every other year for women of normal risk, age 54-69.  -Cervical cancer screenings for women over age 72 are only recommended with certain risk factors.      Here is a list of your current Health Maintenance items (your personalized list of preventive services) with a due date:  Health Maintenance Due   Topic Date Due    DTaP/Tdap/Td  (1 - Tdap) Never done    Shingles Vaccine (1 of 2) Never done    Pneumococcal Vaccine (1 of 1 - PPSV23) Never done    Yearly Flu Vaccine (1) Never done

## 2021-09-13 NOTE — PROGRESS NOTES
Ms. Michele Nguyễn is presenting to follow up     CC: Annual Wellness Visit       HPI:    Increased avapro due to higher BP increased to 300mg daily and taking HCTZ 12.5mg daily. BP is still elevated today. Patient denies CP , headache  I feel better since increasing the dose of avapro  She is under stress due to mother having planned cardiac surgery  She denies chest pain and dyspnea. She is having increase knee pain. Gained weight and plans to get in the pool more often to help with pain and increase exercise  Discussed increasing HCTZ or adding third agent for BP patient declines    Occasionally takes 20mg of lasix for fluid swelling    SUDEEP on  CPAP     [facility]     Review of systems:  Constitutional: negative for fever, chills, weight loss, night sweats   10 systems reviewed and negative other than HPI    Past Medical History:   Diagnosis Date    HTN (hypertension) 1/15/2010    Sleep apnea 1/20/2015    Sleep apnea, obstructive 1/5/2016        Past Surgical History:   Procedure Laterality Date    HX APPENDECTOMY      HX HYSTERECTOMY  approx 1987    2/2 uterine fibroids    HX OTHER SURGICAL  2002    hiatal hernia repair        No Known Allergies    Current Outpatient Medications on File Prior to Visit   Medication Sig Dispense Refill    meloxicam (MOBIC) 15 mg tablet TAKE 1 TABLET BY MOUTH EVERY DAY AS NEEDED FOR PAIN 30 Tablet 0    hydroCHLOROthiazide (HYDRODIURIL) 12.5 mg tablet TAKE 1 TABLET BY MOUTH EVERY DAY 90 Tab 2    sodium hyaluronate (SUPARTZ FX/EUFLEXXA/HYALGAN) 10 mg/mL syrg injection 20 mg by Intra artICUlar route as needed.  YUVAFEM 10 mcg tab vaginal tablet Insert 10 mcg into vagina.  Comp. Stocking,Knee,Regular,Lrg misc 20-30mmHG 1 Units 2    multivitamin (ONE A DAY) tablet Take 1 tablet by mouth daily.  gabapentin (NEURONTIN) 100 mg capsule Take 1 Cap by mouth two (2) times a day. Max Daily Amount: 200 mg.  (Patient not taking: Reported on 8/12/2021) 60 Cap 0     No current facility-administered medications on file prior to visit. family history includes Cancer in her father and mother; Hypertension in her brother, father, and sister. Social History     Socioeconomic History    Marital status: SINGLE     Spouse name: Not on file    Number of children: Not on file    Years of education: Not on file    Highest education level: Not on file   Occupational History    Not on file   Tobacco Use    Smoking status: Never Smoker    Smokeless tobacco: Never Used   Vaping Use    Vaping Use: Never used   Substance and Sexual Activity    Alcohol use: Yes     Alcohol/week: 4.2 standard drinks     Types: 5 Glasses of wine per week     Comment: 1 glass per night    Drug use: No    Sexual activity: Yes     Partners: Male     Birth control/protection: Condom   Other Topics Concern    Not on file   Social History Narrative    Not on file     Social Determinants of Health     Financial Resource Strain:     Difficulty of Paying Living Expenses:    Food Insecurity:     Worried About Running Out of Food in the Last Year:     Ran Out of Food in the Last Year:    Transportation Needs:     Lack of Transportation (Medical):      Lack of Transportation (Non-Medical):    Physical Activity:     Days of Exercise per Week:     Minutes of Exercise per Session:    Stress:     Feeling of Stress :    Social Connections:     Frequency of Communication with Friends and Family:     Frequency of Social Gatherings with Friends and Family:     Attends Nondenominational Services:     Active Member of Clubs or Organizations:     Attends Club or Organization Meetings:     Marital Status:    Intimate Partner Violence:     Fear of Current or Ex-Partner:     Emotionally Abused:     Physically Abused:     Sexually Abused:        Visit Vitals  BP (!) 179/87 (BP 1 Location: Left upper arm, BP Patient Position: Sitting)   Pulse 70   Temp 97 °F (36.1 °C) (Temporal)   Resp 16   Ht 5' (1.524 m)   Wt 233 lb (105.7 kg)   SpO2 96%   BMI 45.50 kg/m²     General:  Well appearing female no acute distress  HEENT:   PERRL,normal conjunctiva. External ear and canals normal, TMs normal.  Hearing normal to voice. Neck:  Supple. Respiratory: no respiratory distress,  no wheezing, no rhonchi, no rales. No chest wall tenderness. Cardiovascular:  RRR, normal S1S2, no murmur. Gastrointestinal: normal bowel sounds, soft, nontender, without masses. No hepatosplenomegaly. Extremities +2 pulses, no edema, normal sensation   Musculoskeletal:  Normal gait. Normal digits and nails. Skin:  No rash, no lesions, no ulcers. Skin warm, normal turgor, without induration or nodules. Neuro:  A and OX4, fluent speech, cranial nerves normal 2-12.  Psych:  Normal affect      Lab Results   Component Value Date/Time    WBC 6.6 09/03/2021 08:50 AM    HGB 13.5 09/03/2021 08:50 AM    HCT 43.4 09/03/2021 08:50 AM    PLATELET 865 05/48/3080 08:50 AM    MCV 91.9 09/03/2021 08:50 AM     Lab Results   Component Value Date/Time    Sodium 141 09/03/2021 08:50 AM    Potassium 4.1 09/03/2021 08:50 AM    Chloride 106 09/03/2021 08:50 AM    CO2 32 09/03/2021 08:50 AM    Anion gap 3 (L) 09/03/2021 08:50 AM    Glucose 82 09/03/2021 08:50 AM    BUN 16 09/03/2021 08:50 AM    Creatinine 0.83 09/03/2021 08:50 AM    BUN/Creatinine ratio 19 09/03/2021 08:50 AM    GFR est AA >60 09/03/2021 08:50 AM    GFR est non-AA >60 09/03/2021 08:50 AM    Calcium 9.0 09/03/2021 08:50 AM     Lab Results   Component Value Date/Time    Cholesterol, total 210 (H) 09/03/2021 08:50 AM    HDL Cholesterol 86 09/03/2021 08:50 AM    LDL, calculated 110 (H) 09/03/2021 08:50 AM    VLDL, calculated 14 09/03/2021 08:50 AM    Triglyceride 70 09/03/2021 08:50 AM    CHOL/HDL Ratio 2.4 09/03/2021 08:50 AM     Lab Results   Component Value Date/Time    TSH 2.200 03/26/2018 10:59 AM     Lab Results   Component Value Date/Time    Hemoglobin A1c 5.4 08/28/2018 10:25 AM     Lab Results   Component Value Date/Time    Vitamin D 25-Hydroxy 28.7 (L) 09/03/2021 08:50 AM                   Assessment and Plan:     1. Essential hypertension  Not at goal on current regimen of avapro 300 and HCTZ 12.5mg   - patient is hesitant to increase dose of HCTZ or add third agent  Discussed risks of high BP  She attributes high blood pressure to increase current stress. -recommend checking blood pressure with goal of less than  130/85 on average. Follow lo sodium diet. Given DASH diet guidelines. Recommend cardiovascular exercise regularly. Work on weight reduction. Call with blood pressure readings. - furosemide (LASIX) 20 mg tablet; Take 1 Tablet by mouth daily as needed (edema). Dispense: 30 Tablet; Refill: 1  - irbesartan (AVAPRO) 300 mg tablet; Take 1 Tablet by mouth nightly. Dispense: 90 Tablet; Refill: 1    2. Morbid obesity with BMI of 40.0-44.9, adult ([facility] [address]  Counseled on weight loss and healthy diet    3. Primary osteoarthritis of knee, unspecified laterality  Takes occasional Mobic for severe pain discussed to avoid this as much as possible as it can increase blood pressure. Advised to take Tylenol for pain    4. Medicare annual wellness visit, subsequent  - diph,pertuss,acel,,tetanus vac,PF, (ADACEL) 2 Lf-(2.5-5-3-5 mcg)-5Lf/0.5 mL syrg vaccine; 0.5 mL by IntraMUSCular route once for 1 dose. Dispense: 0.5 mL; Refill: 0    5. Obstructive sleep apnea reports compliance with CPAP    Follow-up and Dispositions    · Return in about 2 months (around 11/13/2021) for Hypertension . Mirella Camp MD  This is the Subsequent Medicare Annual Wellness Exam, performed 12 months or more after the Initial AWV or the last Subsequent AWV    I have reviewed the patient's medical history in detail and updated the computerized patient record. Assessment/Plan   Education and counseling provided:  Are appropriate based on today's review and evaluation    1. Morbid obesity with BMI of 40.0-44.9, adult ([facility] [address]  2. Essential hypertension  -     furosemide (LASIX) 20 mg tablet; Take 1 Tablet by mouth daily as needed (edema). , Normal, Disp-30 Tablet, R-1  -     irbesartan (AVAPRO) 300 mg tablet; Take 1 Tablet by mouth nightly., Normal, Disp-90 Tablet, R-1  3. Primary osteoarthritis of knee, unspecified laterality  4. Medicare annual wellness visit, subsequent  -     diph,pertuss,acel,,tetanus vac,PF, (ADACEL) 2 Lf-(2.5-5-3-5 mcg)-5Lf/0.5 mL syrg vaccine; 0.5 mL by IntraMUSCular route once for 1 dose., Print, Disp-0.5 mL, R-0       Depression Risk Factor Screening     3 most recent PHQ Screens 9/13/2021   Little interest or pleasure in doing things Not at all   Feeling down, depressed, irritable, or hopeless Not at all   Total Score PHQ 2 0       Alcohol Risk Screen    Do you average more than 1 drink per night or more than 7 drinks a week:  No    On any one occasion in the past three months have you have had more than 3 drinks containing alcohol:  No        Functional Ability and Level of Safety    Hearing: Hearing is good. Activities of Daily Living: The home contains: no safety equipment. Patient does total self care      Ambulation: with no difficulty     Fall Risk:  Fall Risk Assessment, last 12 mths 9/13/2021   Able to walk? Yes   Fall in past 12 months? 0   Do you feel unsteady?  0   Are you worried about falling 0      Abuse Screen:  Patient is not abused       Cognitive Screening    Has your family/caregiver stated any concerns about your memory: no     Cognitive Screening: Normal - Verbal Fluency Test    Health Maintenance Due     Health Maintenance Due   Topic Date Due    DTaP/Tdap/Td series (1 - Tdap) Never done    Shingrix Vaccine Age 50> (1 of 2) Never done    Pneumococcal 65+ years (1 of 1 - PPSV23) Never done    Flu Vaccine (1) Never done       Patient Care Team   Patient Care Team:  Delmar Duverney, MD as PCP - General (Internal Medicine)  Appa Pattie Mauro MD as PCP - [facility] Empaneled Provider  Libra Hartley MD as Surgeon (General Surgery)    Catalina Fraga MD

## 2021-09-13 NOTE — PROGRESS NOTES
Identified pt with two pt identifiers(name and ). Reviewed record in preparation for visit and have obtained necessary documentation. Chief Complaint   Patient presents with   Irlanda Bennett Annual Wellness Visit        Health Maintenance Due   Topic    DTaP/Tdap/Td series (1 - Tdap)    Shingrix Vaccine Age 50> (1 of 2)    Pneumococcal 65+ years (1 of 1 - PPSV23)    Medicare Yearly Exam     Flu Vaccine (1)        Visit Vitals  BP (!) 179/87 (BP 1 Location: Left upper arm, BP Patient Position: Sitting)   Pulse 70   Temp 97 °F (36.1 °C) (Temporal)   Resp 16   Ht 5' (1.524 m)   Wt 233 lb (105.7 kg)   SpO2 96%   BMI 45.50 kg/m²     Pain Scale: /10    Coordination of Care Questionnaire:  :   1. Have you been to the ER, urgent care clinic since your last visit? Hospitalized since your last visit? No    2. Have you seen or consulted any other health care providers outside of the [address] [place] [address] since your last visit? Include any pap smears or colon screening.  No

## 2021-09-26 DIAGNOSIS — M17.10 PRIMARY OSTEOARTHRITIS OF KNEE, UNSPECIFIED LATERALITY: ICD-10-CM

## 2021-09-27 RX ORDER — MELOXICAM 15 MG/1
TABLET ORAL
Qty: 30 TABLET | Refills: 0 | Status: SHIPPED | OUTPATIENT
Start: 2021-09-27 | End: 2021-10-21

## 2021-10-08 DIAGNOSIS — I10 ESSENTIAL HYPERTENSION: ICD-10-CM

## 2021-10-08 RX ORDER — FUROSEMIDE 20 MG/1
TABLET ORAL
Qty: 30 TABLET | Refills: 1 | Status: SHIPPED | OUTPATIENT
Start: 2021-10-08 | End: 2022-08-17 | Stop reason: SDUPTHER

## 2021-10-21 DIAGNOSIS — M17.10 PRIMARY OSTEOARTHRITIS OF KNEE, UNSPECIFIED LATERALITY: ICD-10-CM

## 2021-10-21 RX ORDER — MELOXICAM 15 MG/1
TABLET ORAL
Qty: 30 TABLET | Refills: 0 | Status: SHIPPED | OUTPATIENT
Start: 2021-10-21 | End: 2021-11-17

## 2021-11-17 DIAGNOSIS — M17.10 PRIMARY OSTEOARTHRITIS OF KNEE, UNSPECIFIED LATERALITY: ICD-10-CM

## 2021-11-17 RX ORDER — MELOXICAM 15 MG/1
TABLET ORAL
Qty: 30 TABLET | Refills: 0 | Status: SHIPPED | OUTPATIENT
Start: 2021-11-17 | End: 2021-12-23

## 2021-12-23 DIAGNOSIS — M17.10 PRIMARY OSTEOARTHRITIS OF KNEE, UNSPECIFIED LATERALITY: ICD-10-CM

## 2021-12-23 RX ORDER — MELOXICAM 15 MG/1
TABLET ORAL
Qty: 30 TABLET | Refills: 0 | Status: SHIPPED | OUTPATIENT
Start: 2021-12-23 | End: 2022-01-28

## 2022-01-16 DIAGNOSIS — I10 ESSENTIAL HYPERTENSION: ICD-10-CM

## 2022-01-17 RX ORDER — HYDROCHLOROTHIAZIDE 12.5 MG/1
TABLET ORAL
Qty: 90 TABLET | Refills: 2 | Status: SHIPPED | OUTPATIENT
Start: 2022-01-17 | End: 2022-10-16

## 2022-01-28 DIAGNOSIS — M17.10 PRIMARY OSTEOARTHRITIS OF KNEE, UNSPECIFIED LATERALITY: ICD-10-CM

## 2022-01-28 RX ORDER — MELOXICAM 15 MG/1
TABLET ORAL
Qty: 30 TABLET | Refills: 0 | Status: SHIPPED | OUTPATIENT
Start: 2022-01-28 | End: 2022-02-24

## 2022-02-18 ENCOUNTER — TELEPHONE (OUTPATIENT)
Dept: INTERNAL MEDICINE CLINIC | Age: 67
End: 2022-02-18

## 2022-02-18 NOTE — TELEPHONE ENCOUNTER
----- Message from Svetlana Marin sent at 2/18/2022  2:33 PM EST -----  Subject: Appointment Request    Reason for Call: Routine (Patient Request) No Script    QUESTIONS  Type of Appointment? Established Patient  Reason for appointment request? Available appointments did not meet   patient need  Additional Information for Provider? patient is requesting to schedule an   appt. for bp check   ---------------------------------------------------------------------------  --------------  CALL BACK INFO  What is the best way for the office to contact you? OK to leave message on   voicemail  Preferred Call Back Phone Number? [phone]  ---------------------------------------------------------------------------  --------------  SCRIPT ANSWERS  Relationship to Patient? Self  (Is the patient requesting to see the provider for a procedure?)? No  (Is the patient requesting to see the provider urgently  today or   tomorrow. )? No  Have you been diagnosed with, awaiting test results for, or told that you   are suspected of having COVID-19 (Coronavirus)? (If patient has tested   negative or was tested as a requirement for work, school, or travel and   not based on symptoms, answer no)? No  Within the past 10 days have you developed any of the following symptoms   (answer no if symptoms have been present longer than 10 days or began   more than 10 days ago)? Fever or Chills, Cough, Shortness of breath or   difficulty breathing, Loss of taste or smell, Sore throat, Nasal   congestion, Sneezing or runny nose, Fatigue or generalized body aches   (answer no if pain is specific to a body part e.g. back pain), Diarrhea,   Headache? No  Have you had close contact with someone with COVID-19 in the last 7 days? No  (Service Expert  click yes below to proceed with Mellissa Lozano As Usual   Scheduling)?  Yes

## 2022-02-24 DIAGNOSIS — M17.10 PRIMARY OSTEOARTHRITIS OF KNEE, UNSPECIFIED LATERALITY: ICD-10-CM

## 2022-02-24 RX ORDER — MELOXICAM 15 MG/1
TABLET ORAL
Qty: 30 TABLET | Refills: 0 | Status: SHIPPED | OUTPATIENT
Start: 2022-02-24 | End: 2022-03-24

## 2022-03-19 PROBLEM — E66.01 MORBID OBESITY WITH BODY MASS INDEX OF 45.0-49.9 IN ADULT (HCC): Status: ACTIVE | Noted: 2018-09-28

## 2022-03-24 DIAGNOSIS — M17.10 PRIMARY OSTEOARTHRITIS OF KNEE, UNSPECIFIED LATERALITY: ICD-10-CM

## 2022-03-24 RX ORDER — MELOXICAM 15 MG/1
TABLET ORAL
Qty: 30 TABLET | Refills: 0 | Status: SHIPPED | OUTPATIENT
Start: 2022-03-24 | End: 2022-04-26

## 2022-04-14 ENCOUNTER — OFFICE VISIT (OUTPATIENT)
Dept: INTERNAL MEDICINE CLINIC | Age: 67
End: 2022-04-14
Payer: MEDICARE

## 2022-04-14 VITALS
HEART RATE: 73 BPM | BODY MASS INDEX: 44.56 KG/M2 | SYSTOLIC BLOOD PRESSURE: 164 MMHG | RESPIRATION RATE: 16 BRPM | DIASTOLIC BLOOD PRESSURE: 78 MMHG | OXYGEN SATURATION: 97 % | TEMPERATURE: 97.1 F | WEIGHT: 236 LBS | HEIGHT: 61 IN

## 2022-04-14 DIAGNOSIS — M54.9 LEFT-SIDED BACK PAIN, UNSPECIFIED BACK LOCATION, UNSPECIFIED CHRONICITY: ICD-10-CM

## 2022-04-14 DIAGNOSIS — E66.01 MORBID OBESITY WITH BMI OF 40.0-44.9, ADULT (HCC): ICD-10-CM

## 2022-04-14 DIAGNOSIS — I10 ESSENTIAL HYPERTENSION: Primary | ICD-10-CM

## 2022-04-14 DIAGNOSIS — M17.10 PRIMARY OSTEOARTHRITIS OF KNEE, UNSPECIFIED LATERALITY: ICD-10-CM

## 2022-04-14 DIAGNOSIS — E55.9 VITAMIN D DEFICIENCY: ICD-10-CM

## 2022-04-14 PROCEDURE — 1090F PRES/ABSN URINE INCON ASSESS: CPT | Performed by: INTERNAL MEDICINE

## 2022-04-14 PROCEDURE — 1101F PT FALLS ASSESS-DOCD LE1/YR: CPT | Performed by: INTERNAL MEDICINE

## 2022-04-14 PROCEDURE — G8427 DOCREV CUR MEDS BY ELIG CLIN: HCPCS | Performed by: INTERNAL MEDICINE

## 2022-04-14 PROCEDURE — G8753 SYS BP > OR = 140: HCPCS | Performed by: INTERNAL MEDICINE

## 2022-04-14 PROCEDURE — G8754 DIAS BP LESS 90: HCPCS | Performed by: INTERNAL MEDICINE

## 2022-04-14 PROCEDURE — G8399 PT W/DXA RESULTS DOCUMENT: HCPCS | Performed by: INTERNAL MEDICINE

## 2022-04-14 PROCEDURE — 99214 OFFICE O/P EST MOD 30 MIN: CPT | Performed by: INTERNAL MEDICINE

## 2022-04-14 PROCEDURE — 3017F COLORECTAL CA SCREEN DOC REV: CPT | Performed by: INTERNAL MEDICINE

## 2022-04-14 PROCEDURE — G8510 SCR DEP NEG, NO PLAN REQD: HCPCS | Performed by: INTERNAL MEDICINE

## 2022-04-14 PROCEDURE — G8417 CALC BMI ABV UP PARAM F/U: HCPCS | Performed by: INTERNAL MEDICINE

## 2022-04-14 PROCEDURE — G8536 NO DOC ELDER MAL SCRN: HCPCS | Performed by: INTERNAL MEDICINE

## 2022-04-14 NOTE — PROGRESS NOTES
1. \"Have you been to the ER, urgent care clinic since your last visit? Hospitalized since your last visit? \" No    2. \"Have you seen or consulted any other health care providers outside of the [address] [place] [address] since your last visit? \" No     3. For patients aged 39-70: Has the patient had a colonoscopy / FIT/ Cologuard? Yes - no Care Gap present      If the patient is female:    4. For patients aged 41-77: Has the patient had a mammogram within the past 2 years? Yes - Care Gap present. Rooming MA/LPN to request most recent results      5. For patients aged 21-65: Has the patient had a pap smear? Yes - Care Gap present.  Rooming MA/LPN to request most recent results

## 2022-04-14 NOTE — PROGRESS NOTES
Ms. Opal Villatoro is presenting to follow up     CC:  No chief complaint on file. HPI:    Increased avapro due to higher BP increased to 300mg daily and taking HCTZ 12.5mg daily. BP is still elevated toda but better y. Patient denies CP , headache  I  She denies chest pain and dyspnea. She is having increase knee pain. She wants to get a knee replacement at some point  Gained weight and plans to get in the pool more often to help with pain and increase exercise  Discussed increasing HCTZ or adding third agent for BP patient declines    Occasionally takes 20mg of lasix for fluid swelling    SUDEEP on  CPAP it was not recalled    Need records of mamm[facility]     Taking vitamin D 5000 units daily      She complains of left trapezius muscle pain that radiates to the shoulder and head this is been going on for several months. She has shocklike pain   Denies weakness, denies radiation to hands    Review of systems:  Constitutional: negative for fever, chills, weight loss, night sweats   10 systems reviewed and negative other than HPI    Past Medical History:   Diagnosis Date    HTN (hypertension) 1/15/2010    Sleep apnea 1/20/2015    Sleep apnea, obstructive 1/5/2016        Past Surgical History:   Procedure Laterality Date    HX APPENDECTOMY      HX HYSTERECTOMY  approx 1987    2/2 uterine fibroids    HX OTHER SURGICAL  2002    hiatal hernia repair        No Known Allergies    Current Outpatient Medications on File Prior to Visit   Medication Sig Dispense Refill    meloxicam (MOBIC) 15 mg tablet TAKE 1 TABLET BY MOUTH EVERY DAY AS NEEDED FOR PAIN 30 Tablet 0    hydroCHLOROthiazide (HYDRODIURIL) 12.5 mg tablet TAKE 1 TABLET BY MOUTH EVERY DAY 90 Tablet 2    furosemide (LASIX) 20 mg tablet TAKE 1 TABLET BY MOUTH DAILY AS NEEDED 30 Tablet 1    irbesartan (AVAPRO) 300 mg tablet Take 1 Tablet by mouth nightly. 90 Tablet 1    YUVAFEM 10 mcg tab vaginal tablet Insert 10 mcg into vagina.  Comp. Stocking,Knee,Regular,Lrg misc 20-30mmHG 1 Units 2    multivitamin (ONE A DAY) tablet Take 1 tablet by mouth daily.  gabapentin (NEURONTIN) 100 mg capsule Take 1 Cap by mouth two (2) times a day. Max Daily Amount: 200 mg. (Patient not taking: Reported on 8/12/2021) 60 Cap 0    sodium hyaluronate (SUPARTZ FX/EUFLEXXA/HYALGAN) 10 mg/mL syrg injection 20 mg by Intra artICUlar route as needed. No current facility-administered medications on file prior to visit. family history includes Cancer in her father and mother; Hypertension in her brother, father, and sister. Social History     Socioeconomic History    Marital status: SINGLE     Spouse name: Not on file    Number of children: Not on file    Years of education: Not on file    Highest education level: Not on file   Occupational History    Not on file   Tobacco Use    Smoking status: Never Smoker    Smokeless tobacco: Never Used   Vaping Use    Vaping Use: Never used   Substance and Sexual Activity    Alcohol use: Yes     Alcohol/week: 4.2 standard drinks     Types: 5 Glasses of wine per week     Comment: 1 glass per night    Drug use: No    Sexual activity: Yes     Partners: Male     Birth control/protection: Condom   Other Topics Concern    Not on file   Social History Narrative    Not on file     Social Determinants of Health     Financial Resource Strain:     Difficulty of Paying Living Expenses: Not on file   Food Insecurity:     Worried About Running Out of Food in the Last Year: Not on file    Marvin of Food in the Last Year: Not on file   Transportation Needs:     Lack of Transportation (Medical): Not on file    Lack of Transportation (Non-Medical):  Not on file   Physical Activity:     Days of Exercise per Week: Not on file    Minutes of Exercise per Session: Not on file   Stress:     Feeling of Stress : Not on file   Social Connections:     Frequency of Communication with Friends and Family: Not on file  Frequency of Social Gatherings with Friends and Family: Not on file    Attends Sikhism Services: Not on file    Active Member of Clubs or Organizations: Not on file    Attends Club or Organization Meetings: Not on file    Marital Status: Not on file   Intimate Partner Violence:     Fear of Current or Ex-Partner: Not on file    Emotionally Abused: Not on file    Physically Abused: Not on file    Sexually Abused: Not on file   Housing Stability:     Unable to Pay for Housing in the Last Year: Not on file    Number of [place] in the Last Year: Not on file    Unstable Housing in the Last Year: Not on file       There were no vitals taken for this visit. General:  Well appearing female no acute distress  HEENT:   PERRL,normal conjunctiva. External ear and canals normal, TMs normal.  Hearing normal to voice. Neck:  Supple. Respiratory: no respiratory distress,  no wheezing, no rhonchi, no rales. No chest wall tenderness. Cardiovascular:  RRR, normal S1S2, no murmur. Gastrointestinal: normal bowel sounds, soft, nontender, without masses. No hepatosplenomegaly. Extremities +2 pulses, no edema, normal sensation   Musculoskeletal:  Normal gait. Normal digits and nails. Normal neck exam normal strength in hands and arms  Skin:  No rash, no lesions, no ulcers. Skin warm, normal turgor, without induration or nodules. Neuro:  A and OX4, fluent speech, cranial nerves normal 2-12.  Psych:  Normal affect      Lab Results   Component Value Date/Time    WBC 6.6 09/03/2021 08:50 AM    HGB 13.5 09/03/2021 08:50 AM    HCT 43.4 09/03/2021 08:50 AM    PLATELET 623 75/75/6044 08:50 AM    MCV 91.9 09/03/2021 08:50 AM     Lab Results   Component Value Date/Time    Sodium 141 09/03/2021 08:50 AM    Potassium 4.1 09/03/2021 08:50 AM    Chloride 106 09/03/2021 08:50 AM    CO2 32 09/03/2021 08:50 AM    Anion gap 3 (L) 09/03/2021 08:50 AM    Glucose 82 09/03/2021 08:50 AM    BUN 16 09/03/2021 08:50 AM Creatinine 0.83 09/03/2021 08:50 AM    BUN/Creatinine ratio 19 09/03/2021 08:50 AM    GFR est AA >60 09/03/2021 08:50 AM    GFR est non-AA >60 09/03/2021 08:50 AM    Calcium 9.0 09/03/2021 08:50 AM     Lab Results   Component Value Date/Time    Cholesterol, total 210 (H) 09/03/2021 08:50 AM    HDL Cholesterol 86 09/03/2021 08:50 AM    LDL, calculated 110 (H) 09/03/2021 08:50 AM    VLDL, calculated 14 09/03/2021 08:50 AM    Triglyceride 70 09/03/2021 08:50 AM    CHOL/HDL Ratio 2.4 09/03/2021 08:50 AM     Lab Results   Component Value Date/Time    TSH 2.200 03/26/2018 10:59 AM     Lab Results   Component Value Date/Time    Hemoglobin A1c 5.4 08/28/2018 10:25 AM     Lab Results   Component Value Date/Time    Vitamin D 25-Hydroxy 28.7 (L) 09/03/2021 08:50 AM                   Assessment and Plan:     1. Essential hypertension  Not at goal on current regimen of avapro 300 and HCTZ 12.5mg   - patient is hesitant to increase dose of HCTZ or add third agent  Discussed risks of high BP  She attributes high blood pressure to increase current stress and pain  Plans to start exercise program  -recommend checking blood pressure with goal of less than  130/85 on average. Follow lo sodium diet. Given DASH diet guidelines. Recommend cardiovascular exercise regularly. Work on weight reduction. Call with blood pressure readings. - furosemide (LASIX) 20 mg tablet; Take 1 Tablet by mouth daily as needed (edema). Dispense: 30 Tablet; Refill: 1  - irbesartan (AVAPRO) 300 mg tablet; Take 1 Tablet by mouth nightly. Dispense: 90 Tablet; Refill: 1    2. Morbid obesity with BMI of 40.0-44.9, adult ([address]  Counseled on weight loss and healthy diet    3. Primary osteoarthritis of knee, unspecified laterality  Takes occasional Mobic for severe pain discussed to avoid this as much as possible as it can increase blood pressure. Advised to take Tylenol for pain      5. Obstructive sleep apnea reports compliance with CPAP    6.  Neck pain suspect cervical radiculopathy - x ray ordered and referral to PT    Orders Placed This Encounter    XR SPINE CERV 4 OR 5 V     Standing Status:   Future     Standing Expiration Date:   5/14/2023    VITAMIN D, 25 HYDROXY     Standing Status:   Future     Number of Occurrences:   1     Standing Expiration Date:   3/72/9338    METABOLIC PANEL, COMPREHENSIVE     Standing Status:   Future     Number of Occurrences:   1     Standing Expiration Date:   4/14/2023    CBC WITH AUTOMATED DIFF     Standing Status:   Future     Number of Occurrences:   1     Standing Expiration Date:   4/14/2023    REFERRAL TO PHYSICAL THERAPY     Referral Priority:   Routine     Referral Type:   PT/OT/ST     Referral Reason:   Specialty Services Required     Referred to Provider:   Tamar Martinez PT     Requested Specialty:   Physical Therapy     Number of Visits Requested:   1

## 2022-04-15 ENCOUNTER — HOSPITAL ENCOUNTER (OUTPATIENT)
Dept: GENERAL RADIOLOGY | Age: 67
Discharge: HOME | End: 2022-04-15
Payer: MEDICARE

## 2022-04-15 DIAGNOSIS — M54.9 LEFT-SIDED BACK PAIN, UNSPECIFIED BACK LOCATION, UNSPECIFIED CHRONICITY: ICD-10-CM

## 2022-04-15 LAB
25(OH)D3 SERPL-MCNC: 32.3 NG/ML (ref 30–100)
ALBUMIN SERPL-MCNC: 3.6 G/DL (ref 3.5–5)
ALBUMIN/GLOB SERPL: 1.2 {RATIO} (ref 1.1–2.2)
ALP SERPL-CCNC: 104 U/L (ref 45–117)
ALT SERPL-CCNC: 29 U/L (ref 12–78)
ANION GAP SERPL CALC-SCNC: 5 MMOL/L (ref 5–15)
AST SERPL-CCNC: 19 U/L (ref 15–37)
BASOPHILS # BLD: 0 K/UL (ref 0–0.1)
BASOPHILS NFR BLD: 0 % (ref 0–1)
BILIRUB SERPL-MCNC: 0.6 MG/DL (ref 0.2–1)
BUN SERPL-MCNC: 18 MG/DL (ref 6–20)
BUN/CREAT SERPL: 20 (ref 12–20)
CALCIUM SERPL-MCNC: 9.1 MG/DL (ref 8.5–10.1)
CHLORIDE SERPL-SCNC: 106 MMOL/L (ref 97–108)
CO2 SERPL-SCNC: 30 MMOL/L (ref 21–32)
CREAT SERPL-MCNC: 0.9 MG/DL (ref 0.55–1.02)
DIFFERENTIAL METHOD BLD: NORMAL
EOSINOPHIL # BLD: 0.1 K/UL (ref 0–0.4)
EOSINOPHIL NFR BLD: 1 % (ref 0–7)
ERYTHROCYTE [DISTWIDTH] IN BLOOD BY AUTOMATED COUNT: 13.6 % (ref 11.5–14.5)
GLOBULIN SER CALC-MCNC: 3.1 G/DL (ref 2–4)
GLUCOSE SERPL-MCNC: 72 MG/DL (ref 65–100)
HCT VFR BLD AUTO: 43.8 % (ref 35–47)
HGB BLD-MCNC: 13.2 G/DL (ref 11.5–16)
IMM GRANULOCYTES # BLD AUTO: 0 K/UL (ref 0–0.04)
IMM GRANULOCYTES NFR BLD AUTO: 0 % (ref 0–0.5)
LYMPHOCYTES # BLD: 1.7 K/UL (ref 0.8–3.5)
LYMPHOCYTES NFR BLD: 22 % (ref 12–49)
MCH RBC QN AUTO: 28.4 PG (ref 26–34)
MCHC RBC AUTO-ENTMCNC: 30.1 G/DL (ref 30–36.5)
MCV RBC AUTO: 94.2 FL (ref 80–99)
MONOCYTES # BLD: 0.4 K/UL (ref 0–1)
MONOCYTES NFR BLD: 5 % (ref 5–13)
NEUTS SEG # BLD: 5.5 K/UL (ref 1.8–8)
NEUTS SEG NFR BLD: 72 % (ref 32–75)
NRBC # BLD: 0 K/UL (ref 0–0.01)
NRBC BLD-RTO: 0 PER 100 WBC
PLATELET # BLD AUTO: 327 K/UL (ref 150–400)
PMV BLD AUTO: 10.2 FL (ref 8.9–12.9)
POTASSIUM SERPL-SCNC: 4.4 MMOL/L (ref 3.5–5.1)
PROT SERPL-MCNC: 6.7 G/DL (ref 6.4–8.2)
RBC # BLD AUTO: 4.65 M/UL (ref 3.8–5.2)
SODIUM SERPL-SCNC: 141 MMOL/L (ref 136–145)
WBC # BLD AUTO: 7.7 K/UL (ref 3.6–11)

## 2022-04-15 PROCEDURE — 72052 X-RAY EXAM NECK SPINE 6/>VWS: CPT

## 2022-04-18 ENCOUNTER — PATIENT MESSAGE (OUTPATIENT)
Dept: INTERNAL MEDICINE CLINIC | Age: 67
End: 2022-04-18

## 2022-04-26 DIAGNOSIS — M17.10 PRIMARY OSTEOARTHRITIS OF KNEE, UNSPECIFIED LATERALITY: ICD-10-CM

## 2022-04-26 RX ORDER — MELOXICAM 15 MG/1
TABLET ORAL
Qty: 30 TABLET | Refills: 0 | Status: SHIPPED | OUTPATIENT
Start: 2022-04-26 | End: 2022-05-27

## 2022-04-26 NOTE — PROGRESS NOTES
The x ray is showing spurring ( extra bone growth ) in C6 C7 area and nerve impingement which is the cause for the pain.    I recommend physical therapy if symptoms persist can refer to ortho neck specialist

## 2022-04-27 DIAGNOSIS — Z76.89 ENCOUNTER FOR WEIGHT MANAGEMENT: Primary | ICD-10-CM

## 2022-04-27 DIAGNOSIS — E66.01 MORBID OBESITY (HCC): ICD-10-CM

## 2022-04-29 ENCOUNTER — HOSPITAL ENCOUNTER (OUTPATIENT)
Dept: PHYSICAL THERAPY | Age: 67
Discharge: HOME | End: 2022-04-29
Payer: MEDICARE

## 2022-04-29 PROCEDURE — 97162 PT EVAL MOD COMPLEX 30 MIN: CPT | Performed by: PHYSICAL THERAPIST

## 2022-04-29 PROCEDURE — 97110 THERAPEUTIC EXERCISES: CPT | Performed by: PHYSICAL THERAPIST

## 2022-04-29 NOTE — PROGRESS NOTES
[facility] (DP/SNF) Physical Therapy  [address] ([address]), [address] [facility], [place]essa Schmid  Phone: [phone] Fax: [phone]     Plan of Care/Statement of Necessity for Physical Therapy Services  2-15    Patient name: Alana Burgess  : 1955  Provider#: [id number]  Referral source: Saniya Sierra MD      Medical/Treatment Diagnosis: Dorsalgia, unspecified [M54.9]     Prior Hospitalization: see medical history     Comorbidities: arthritis, BMI over 30, HTN, sleep dysfunction  Prior Level of Function: 20 min of exercise seldom or never  Medications: Verified on Patient Summary List  Start of Care: 22      Onset Date: chronic   The Plan of Care and following information is based on the information from the initial evaluation. Assessment/ key information: The patient presents with a chief complaint of frequent left upper trap muscle spasms/shooting pain that will radiate from the shoulder up to her head, on a daily basis. She is hypersensitive to palpate left lateral cervical spine and superior shoulder/AC joint. Recent x-rays on 4/15/22 show mid to lower cervical DDD with spurring. She also may have localized AC joint arthritis on the left, but imaging has not been performed. As of note, an MRI in 2016 of the RIGHT shoulder indicated AC joint arthritis/acromion type 2 and partial thickness supraspinatus tear, though she did not complain of pain in this shoulder today. She is right handed. Her decreased thoracic and cervical mobility, along with decreased periscapular strength, poor posture, and tight pec musculature exacerbate her symptoms. The patient will benefit from guided therapeutic interventions such as therex for strengthening and neuromuscular re-education, manual techniques for joint mobility and soft tissue extensibility, and modalities for pain management in order to improve functional mobility with daily activities.     Evaluation Complexity History MEDIUM  Complexity : 1-2 comorbidities / personal factors will impact the outcome/ POC ; Examination MEDIUM Complexity : 3 Standardized tests and measures addressing body structure, function, activity limitation and / or participation in recreation  ;Presentation MEDIUM Complexity : Evolving with changing characteristics  ; Clinical Decision Making MEDIUM Complexity : FOTO score of 26-74  Overall Complexity Rating: MEDIUM    Problem List: pain affecting function, decrease ROM, decrease strength, edema affecting function, decrease ADL/ functional abilitiies, decrease activity tolerance, decrease flexibility/ joint mobility and decrease transfer abilities   Treatment Plan may include any combination of the following: Therapeutic exercise, Therapeutic activities, Neuromuscular re-education, Physical agent/modality, Manual therapy, Patient education, Self Care training, Functional mobility training and Home safety training  Patient / Family readiness to learn indicated by: asking questions, trying to perform skills and interest  Persons(s) to be included in education: patient (P)  Barriers to Learning/Limitations: None  Patient Goal (s): control or eliminate spasms  Patient Self Reported Health Status: good  Rehabilitation Potential: good    Short Term Goals: To be accomplished in 2 treatments:                         1.) The patient will be independent with their HEP consistently for at least one week. Long Term Goals: To be accomplished in 16 treatments:                         1.) The patient will have at most 6/10 pain with daily activities. 2.) The patient will have at least one day with at most one instance of muscle spasm. 3.) The patient will improve their FOTO score from 69 to at least 71 to show improvements in functional mobility.               4.) The patient will improve her cervical rotation AROM to at least 50 degrees bilaterally to assist with daily activities. Frequency / Duration: Patient to be seen 2 times per week for 16 treatments. Patient/ Caregiver education and instruction: self care, activity modification and exercises    [x]  Plan of care has been reviewed with PTA        Certification Period: 4/29/22-7/28/22  Guera Kennedy, PT 4/29/2022     ________________________________________________________________________    I certify that the above Therapy Services are being furnished while the patient is under my care. I agree with the treatment plan and certify that this therapy is necessary.     Physician's Signature:____________________  Date:____________Time: _________         Raudel Grigsby MD

## 2022-04-29 NOTE — PROGRESS NOTES
PT INITIAL EVALUATION NOTE - [facility] 2-15    Patient Name: Chana Rodriguez  Date:2022  : 1955  [x]  Patient  Verified  Payor: [facility] MEDICARE / Plan: [address] / Product Type: Managed Care Medicare /    In time: 7:40am  Out time: 8:35am  Total Treatment Time (min): 55  Total Timed Codes (min): 25  1:1 Treatment Time ( [address] only): 25   Visit #: 1     Treatment Area: Dorsalgia, unspecified [M54.9]    SUBJECTIVE  Pain Level (0-10 scale): 0 (0-9/10)  Any medication changes, allergies to medications, adverse drug reactions, diagnosis change, or new procedure performed?: [] No    [x] Yes (see summary sheet for update)  Subjective:   X-rays on 4/15/22 show: \"Chronic appearing small anterior subluxation C3-4. Anterior spurring seen at  4556 and C6-7. Mild foraminal narrowing with uncovertebral joint hypertrophy  seen at 4 5 and 5 6 prominent at C6-7. Bilaterally. There is no fracture or  focal lytic lesion. \"     The pain starts in the left shoulder and radiates up her neck to her head. It will spasm multiple times per day. She reads a lot which may contribute to it. It started in . She will use a cane on occasion due the knees. The spasms last for a few seconds. It hurts to rotate to the left. OBJECTIVE/EXAMINATION  Posture: Forward head; scapular protraction bilaterally  Other Observations:  Left sloped shoulder more than right; very antalgic gait with bilateral flexed knees  Palpation: hypersensitive to palpate left lower cervical/upper trap/levator scap/rhomboid; left AC joint        Cervical AROM:        R    L    Flexion    45, p! On left (worse than extension)     Extension   50, p! On left      Side Bending   Pull on the left, limited  limited    Rotation   45    45,p!     A/PROM Shoulder:  [facility]    Joint Mobility Assessment: hypomobile cervical    Flexibility: tight left pec; right upper trap trigger point    UPPER QUARTER     MUSCLE STRENGTH  KEY        R  L  0 - No Contraction   Flexion   5  5  1 - Trace    Extension (elbow) 5  5  2 - Poor    Abduction  5  5  3 - Fair     IR   5  5  4 - Good    ER   5  5  5 - Normal       Neurological: Reflexes / Sensations: nt      25 min Therapeutic Exercise:  [x] See flow sheet :   Rationale: increase ROM, increase strength and improve coordination to improve the patients ability to perform daily activities.           With   [x] TE   [] TA   [] Neuro   [] SC   [] other: Patient Education: [x] Review HEP    [x] Progressed/Changed HEP based on:   [x] positioning   [x] body mechanics   [] transfers   [] heat/ice application    [] other:      Other Objective/Functional Measures: FOTO Functional Measure: 69/100                         Pain Level (0-10 scale) post treatment: 0    ASSESSMENT/Changes in Function:     [x]  See Plan of [address] [facility], PT 4/29/2022

## 2022-05-04 ENCOUNTER — HOSPITAL ENCOUNTER (OUTPATIENT)
Dept: PHYSICAL THERAPY | Age: 67
Discharge: HOME | End: 2022-05-04
Payer: MEDICARE

## 2022-05-04 PROCEDURE — 97014 ELECTRIC STIMULATION THERAPY: CPT | Performed by: PHYSICAL THERAPIST

## 2022-05-04 PROCEDURE — 97110 THERAPEUTIC EXERCISES: CPT | Performed by: PHYSICAL THERAPIST

## 2022-05-04 NOTE — PROGRESS NOTES
PT DAILY TREATMENT NOTE - [facility] 2-15    Patient Name: Freeman King  Date:2022  : 1955  [x]  Patient  Verified  Payor: [employer] [employer] MEDICARE / Plan: [employer] / Product Type: Managed Care Medicare /    In time: 9:32am  Out time: 10:25am  Total Treatment Time (min): 53  Total Timed Codes (min): 38  1:1 Treatment Time ( [address] only): 38   Visit #:  2    Treatment Area: Dorsalgia, unspecified [M54.9]    SUBJECTIVE  Pain Level (0-10 scale): 0  Any medication changes, allergies to medications, adverse drug reactions, diagnosis change, or new procedure performed?: [x] No    [] Yes (see summary sheet for update)  Subjective functional status/changes:   [] No changes reported  The patient reports that she's doing alright; she doesn't like exercise, or cooking, but she has to do both. OBJECTIVE    Modality rationale: decrease edema, decrease inflammation, decrease pain and increase tissue extensibility to improve the patients ability to perform daily activities.    Min Type Additional Details      15 [x] Estim: []Att   [x]Unatt    []TENS instruct                  [x]IFC  []Premod   []NMES                     []Other:  []w/US   []w/ice   [x]w/heat  Position: supine  Location: left shoulder for IFC; neck for MHP       []  Traction: [] Cervical       []Lumbar                       [] Prone          []Supine                       []Intermittent   []Continuous Lbs:  [] before manual  [] after manual  []w/heat    []  Ultrasound: []Continuous   [] Pulsed                       at: []1MHz   []3MHz Location:  W/cm2:    [] Paraffin         Location:   []w/heat    []  Ice     []  Heat  []  Ice massage Position:  Location:    []  Laser  []  Other: Position:  Location:      []  Vasopneumatic Device Pressure:       [] lo [] med [] hi   Temperature:      [x] Skin assessment post-treatment:  [x]intact []redness- no adverse reaction    []redness - adverse reaction:     38 min Therapeutic Exercise:  [x] See flow sheet :   Rationale: increase ROM, increase strength and improve coordination to improve the patients ability to perform daily activities. With   [x] TE   [] TA   [] Neuro   [] SC   [] other: Patient Education: [x] Review HEP    [x] Progressed/Changed HEP based on:   [x] positioning   [x] body mechanics   [] transfers   [] heat/ice application    [] other:      Other Objective/Functional Measures: none noted     Pain Level (0-10 scale) post treatment: 0    ASSESSMENT/Changes in Function:   The patient progressed with therex as tolerated. Patient will continue to benefit from skilled PT services to modify and progress therapeutic interventions, address functional mobility deficits, address ROM deficits, address strength deficits, analyze and address soft tissue restrictions, analyze and cue movement patterns, analyze and modify body mechanics/ergonomics, assess and modify postural abnormalities, address imbalance/dizziness and instruct in home and community integration to attain remaining goals. [x]  See Plan of Care  []  See progress note/recertification  []  See Discharge Summary         Progress towards goals / Updated goals: The patient is progressing towards goals.      PLAN  [x]  Upgrade activities as tolerated     [x]  Continue plan of care  [x]  Update interventions per flow sheet       []  Discharge due to:_  []  Other:_      Ana Carpenter, PT 5/4/2022

## 2022-05-05 NOTE — PROGRESS NOTES
PT DAILY TREATMENT NOTE - [facility] 2-15    Patient Name: Ivette Dietz  Date:2022  : 1955  [x]  Patient  Verified  Payor: [place] [employer] MEDICARE / Plan: [employer] / Product Type: Managed Care Medicare /    In time: 8:35am  Out time: 9:25am  Total Treatment Time (min): 50  Total Timed Codes (min): 35  1:1 Treatment Time ( [address] only): 35  Visit #:  3    Treatment Area: Dorsalgia, unspecified [M54.9]    SUBJECTIVE  Pain Level (0-10 scale): 0  Any medication changes, allergies to medications, adverse drug reactions, diagnosis change, or new procedure performed?: [x] No    [] Yes (see summary sheet for update)  Subjective functional status/changes:   [] No changes reported  The patient reports that she's only had one spasm over the past week; she worked out in the yard all day yesterday and was just extra sore last night. OBJECTIVE    Modality rationale: decrease edema, decrease inflammation, decrease pain and increase tissue extensibility to improve the patients ability to perform daily activities.    Min Type Additional Details      15 [x] Estim: []Att   [x]Unatt    []TENS instruct                  [x]IFC  []Premod   []NMES                     []Other:  []w/US   []w/ice   [x]w/heat  Position: supine  Location: left shoulder for IFC; neck for MHP       []  Traction: [] Cervical       []Lumbar                       [] Prone          []Supine                       []Intermittent   []Continuous Lbs:  [] before manual  [] after manual  []w/heat    []  Ultrasound: []Continuous   [] Pulsed                       at: []1MHz   []3MHz Location:  W/cm2:    [] Paraffin         Location:   []w/heat    []  Ice     []  Heat  []  Ice massage Position:  Location:    []  Laser  []  Other: Position:  Location:      []  Vasopneumatic Device Pressure:       [] lo [] med [] hi   Temperature:      [x] Skin assessment post-treatment:  [x]intact []redness- no adverse reaction    []redness - adverse reaction:     35 min Therapeutic Exercise:  [x] See flow sheet :   Rationale: increase ROM, increase strength and improve coordination to improve the patients ability to perform daily activities. With   [x] TE   [] TA   [] Neuro   [] SC   [] other: Patient Education: [x] Review HEP    [x] Progressed/Changed HEP based on:   [x] positioning   [x] body mechanics   [] transfers   [] heat/ice application    [] other:      Other Objective/Functional Measures: none noted     Pain Level (0-10 scale) post treatment: 0    ASSESSMENT/Changes in Function:   The patient progressed with strengthening and mobility as tolerated. Patient will continue to benefit from skilled PT services to modify and progress therapeutic interventions, address functional mobility deficits, address ROM deficits, address strength deficits, analyze and address soft tissue restrictions, analyze and cue movement patterns, analyze and modify body mechanics/ergonomics, assess and modify postural abnormalities, address imbalance/dizziness and instruct in home and community integration to attain remaining goals. [x]  See Plan of Care  []  See progress note/recertification  []  See Discharge Summary         Progress towards goals / Updated goals: The patient is progressing towards goals.      PLAN  [x]  Upgrade activities as tolerated     [x]  Continue plan of care  [x]  Update interventions per flow sheet       []  Discharge due to:_  []  Other:_      Lee Martinez, PT 5/5/2022

## 2022-05-06 ENCOUNTER — HOSPITAL ENCOUNTER (OUTPATIENT)
Dept: PHYSICAL THERAPY | Age: 67
Discharge: HOME | End: 2022-05-06
Payer: MEDICARE

## 2022-05-06 PROCEDURE — 97110 THERAPEUTIC EXERCISES: CPT | Performed by: PHYSICAL THERAPIST

## 2022-05-06 PROCEDURE — 97014 ELECTRIC STIMULATION THERAPY: CPT | Performed by: PHYSICAL THERAPIST

## 2022-05-09 ENCOUNTER — HOSPITAL ENCOUNTER (OUTPATIENT)
Dept: PHYSICAL THERAPY | Age: 67
Discharge: HOME | End: 2022-05-09
Payer: MEDICARE

## 2022-05-09 PROCEDURE — 97014 ELECTRIC STIMULATION THERAPY: CPT | Performed by: PHYSICAL THERAPIST

## 2022-05-09 PROCEDURE — 97110 THERAPEUTIC EXERCISES: CPT | Performed by: PHYSICAL THERAPIST

## 2022-05-09 NOTE — PROGRESS NOTES
PT DAILY TREATMENT NOTE - [facility] 2-15    Patient Name: Sam Hardy  Date:2022  : 1955  [x]  Patient  Verified  Payor: [place] [employer] MEDICARE / Plan: [employer] / Product Type: Managed Care Medicare /    In time: 10:00am  Out time: 10:51am  Total Treatment Time (min): 51  Total Timed Codes (min): 36  1:1 Treatment Time ( [address] only): 26  Visit #:  4    Treatment Area: Dorsalgia, unspecified [M54.9]    SUBJECTIVE  Pain Level (0-10 scale): 0  Any medication changes, allergies to medications, adverse drug reactions, diagnosis change, or new procedure performed?: [x] No    [] Yes (see summary sheet for update)  Subjective functional status/changes:   [] No changes reported  The patient reports that she's just drained and burnt out; she starts knee rehab this week next door. No spasms over the weekend. OBJECTIVE    Modality rationale: decrease edema, decrease inflammation, decrease pain and increase tissue extensibility to improve the patients ability to perform daily activities.    Min Type Additional Details      15 [x] Estim: []Att   [x]Unatt    []TENS instruct                  [x]IFC  []Premod   []NMES                     []Other:  []w/US   []w/ice   [x]w/heat  Position: supine  Location: left shoulder for IFC; neck for MHP       []  Traction: [] Cervical       []Lumbar                       [] Prone          []Supine                       []Intermittent   []Continuous Lbs:  [] before manual  [] after manual  []w/heat    []  Ultrasound: []Continuous   [] Pulsed                       at: []1MHz   []3MHz Location:  W/cm2:    [] Paraffin         Location:   []w/heat    []  Ice     []  Heat  []  Ice massage Position:  Location:    []  Laser  []  Other: Position:  Location:      []  Vasopneumatic Device Pressure:       [] lo [] med [] hi   Temperature:      [x] Skin assessment post-treatment:  [x]intact []redness- no adverse reaction    []redness - adverse reaction: 36 min Therapeutic Exercise:  [x] See flow sheet :   Rationale: increase ROM, increase strength and improve coordination to improve the patients ability to perform daily activities. With   [x] TE   [] TA   [] Neuro   [] SC   [] other: Patient Education: [x] Review HEP    [x] Progressed/Changed HEP based on:   [x] positioning   [x] body mechanics   [] transfers   [] heat/ice application    [] other:      Other Objective/Functional Measures: none noted     Pain Level (0-10 scale) post treatment: 0    ASSESSMENT/Changes in Function:   The patient progressed with strengthening and mobility as tolerated. Patient will continue to benefit from skilled PT services to modify and progress therapeutic interventions, address functional mobility deficits, address ROM deficits, address strength deficits, analyze and address soft tissue restrictions, analyze and cue movement patterns, analyze and modify body mechanics/ergonomics, assess and modify postural abnormalities, address imbalance/dizziness and instruct in home and community integration to attain remaining goals. [x]  See Plan of Care  []  See progress note/recertification  []  See Discharge Summary         Progress towards goals / Updated goals: The patient is progressing towards goals.      PLAN  [x]  Upgrade activities as tolerated     [x]  Continue plan of care  [x]  Update interventions per flow sheet       []  Discharge due to:_  []  Other:_      Yvan Watson, PT 5/9/2022

## 2022-05-10 ENCOUNTER — OFFICE VISIT (OUTPATIENT)
Dept: SURGERY | Age: 67
End: 2022-05-10
Payer: MEDICARE

## 2022-05-10 VITALS
WEIGHT: 237.9 LBS | HEIGHT: 61 IN | OXYGEN SATURATION: 96 % | SYSTOLIC BLOOD PRESSURE: 186 MMHG | RESPIRATION RATE: 18 BRPM | DIASTOLIC BLOOD PRESSURE: 93 MMHG | HEART RATE: 96 BPM | TEMPERATURE: 98.1 F | BODY MASS INDEX: 44.92 KG/M2

## 2022-05-10 DIAGNOSIS — Z13.1 SCREENING FOR DIABETES MELLITUS: ICD-10-CM

## 2022-05-10 DIAGNOSIS — I10 ESSENTIAL HYPERTENSION: ICD-10-CM

## 2022-05-10 DIAGNOSIS — E66.01 OBESITY, MORBID, BMI 40.0-49.9 (HCC): Primary | ICD-10-CM

## 2022-05-10 DIAGNOSIS — E78.00 HYPERCHOLESTEROLEMIA: ICD-10-CM

## 2022-05-10 DIAGNOSIS — Z13.29 SCREENING FOR HYPOTHYROIDISM: ICD-10-CM

## 2022-05-10 DIAGNOSIS — G47.33 SLEEP APNEA, OBSTRUCTIVE: ICD-10-CM

## 2022-05-10 PROCEDURE — G8399 PT W/DXA RESULTS DOCUMENT: HCPCS | Performed by: FAMILY MEDICINE

## 2022-05-10 PROCEDURE — G8432 DEP SCR NOT DOC, RNG: HCPCS | Performed by: FAMILY MEDICINE

## 2022-05-10 PROCEDURE — G8536 NO DOC ELDER MAL SCRN: HCPCS | Performed by: FAMILY MEDICINE

## 2022-05-10 PROCEDURE — 1101F PT FALLS ASSESS-DOCD LE1/YR: CPT | Performed by: FAMILY MEDICINE

## 2022-05-10 PROCEDURE — 3017F COLORECTAL CA SCREEN DOC REV: CPT | Performed by: FAMILY MEDICINE

## 2022-05-10 PROCEDURE — G8755 DIAS BP > OR = 90: HCPCS | Performed by: FAMILY MEDICINE

## 2022-05-10 PROCEDURE — 1090F PRES/ABSN URINE INCON ASSESS: CPT | Performed by: FAMILY MEDICINE

## 2022-05-10 PROCEDURE — 99214 OFFICE O/P EST MOD 30 MIN: CPT | Performed by: FAMILY MEDICINE

## 2022-05-10 PROCEDURE — G8417 CALC BMI ABV UP PARAM F/U: HCPCS | Performed by: FAMILY MEDICINE

## 2022-05-10 PROCEDURE — G8753 SYS BP > OR = 140: HCPCS | Performed by: FAMILY MEDICINE

## 2022-05-10 PROCEDURE — G8427 DOCREV CUR MEDS BY ELIG CLIN: HCPCS | Performed by: FAMILY MEDICINE

## 2022-05-10 RX ORDER — DEXTROMETHORPHAN HYDROBROMIDE, GUAIFENESIN 5; 100 MG/5ML; MG/5ML
1300 LIQUID ORAL DAILY
COMMUNITY

## 2022-05-10 NOTE — PROGRESS NOTES
[facility] Progress Note: Initial Physician Visit     Chana Rodriguez is a 78 y.o. female who is here for medical screening for entering the [facility].   The patient denies any disease state that requires protein restriction. CC: class3 Obesity    Referred by Dr Florence Rivera reason referred for knee replacement    Weight History  I personally reviewed the Medical Screening Beverli Organ completed by patient and scanned into media section of chart. It includes duration of their obesity, maximum weight, goal weight  all of which give the context of their obesity AND a Family History of their obesity. Weight loss History  I personally reviewed the Medical Screening Beverli Organ completed by the patient and scanned into media section of chart. It includes number of weight loss attempts, the weight loss program that patients was most successful using, and if they have any hx of anorectic medication use, including OTC supplements. Significant Medical History  Past Medical History:   Diagnosis Date    HTN (hypertension) 1/15/2010    Sleep apnea 1/20/2015    Sleep apnea, obstructive 1/5/2016     Patient's medicactions that may contribute: none    Plan to become pregant within 6 months  no    I personally reviewed the [place] completed by the patient and scanned into media section of chart. This allows me to assess associated symptoms that are significant in the assessment of the patient's obesity and the patient's Past Medical History. Outpatient Medications Marked as Taking for the 5/10/22 encounter (Office Visit) with Diogo Langford MD   Medication Sig Dispense Refill    acetaminophen (Tylenol Arthritis Pain) 650 mg TbER Take 1,300 mg by mouth daily.       meloxicam (MOBIC) 15 mg tablet TAKE 1 TABLET BY MOUTH EVERY DAY AS NEEDED FOR PAIN 30 Tablet 0    hydroCHLOROthiazide (HYDRODIURIL) 12.5 mg tablet TAKE 1 TABLET BY MOUTH EVERY DAY 90 Tablet 2    furosemide (LASIX) 20 mg tablet TAKE 1 TABLET BY MOUTH DAILY AS NEEDED 30 Tablet 1    irbesartan (AVAPRO) 300 mg tablet Take 1 Tablet by mouth nightly. 90 Tablet 1    YUVAFEM 10 mcg tab vaginal tablet Insert 10 mcg into vagina two (2) times a week.  Comp. Stocking,Knee,Regular,Lrg misc 20-30mmHG 1 Units 2    multivitamin (ONE A DAY) tablet Take 1 tablet by mouth daily. AVG Hours SLEEP: 3-4 straight then awake and then has trouble going back    SUDEEP   Yes CPAP yes    Significant Psychosocial History   Has a doctor every diagnosed with Binge Eating Disorder, Bulemia or Anorexia? : no     Compliance  Upcoming Travel? Yes cruise in [facility]    Social History  Social History     Tobacco Use    Smoking status: Never Smoker    Smokeless tobacco: Never Used   Substance Use Topics    Alcohol use: Yes     Alcohol/week: 4.2 standard drinks     Types: 5 Glasses of wine per week     Comment: 1 glass per night       Exercise  I personally reviewed the Medical Screening Nicole Caputo completed by the patient and scanned into media section of chart.   MINUTES  0and  0 times a week      Review of Systems  See HPI        Objective  Visit Vitals  BP (!) 186/93 (BP 1 Location: Right arm, BP Patient Position: Sitting, BP Cuff Size: Adult long)   Pulse 96   Temp 98.1 °F (36.7 °C) (Oral)   Resp 18   Ht 5' 1\" (1.549 m)   Wt 237 lb 14.4 oz (107.9 kg)   SpO2 96%   BMI 44.95 kg/m²         Weight Metrics 5/10/2022 5/10/2022 4/14/2022 9/13/2021 7/2/2020 3/9/2020 9/12/2019   Weight - 237 lb 14.4 oz 236 lb 233 lb 240 lb 230 lb 233 lb   Neck Circ (inches) 15.25 - - - - - -   Waist Measure Inches 46.75 - - - - - -   Body Fat % 48.9 - - - - - -   BMI - 44.95 kg/m2 44.59 kg/m2 45.5 kg/m2 46.87 kg/m2 44.92 kg/m2 45.5 kg/m2       Labs: See  labs scanned into Media section or in lab section of record      Physical Exam    Vital Signs Reviewed  Weight Management Metrics Reviewed    Appearance: well  HEENT:  Scleral icterus?  no  Neck:  Thyromegaly or nodules? no  Mouth: Large tongue not examed  Heart:  RRR  Lungs:  clear  Abdomen:     Hepatomegaly? no   Striae present? no  Skin:    Acne?  no   Hirsutism? no   Skin tags? no   Acanthosis Nigricans?  no  Ext:  Edema?  no  creipitus b knees, right more than left      Assessment & Plan  Encounter Diagnoses   Name Primary?  Obesity, morbid, BMI 40.0-49.9 ([facility] [address] Yes    Essential hypertension     Sleep apnea, obstructive     Screening for diabetes mellitus     Hypercholesterolemia     Screening for hypothyroidism        1. labs reviewed w/ patient  2. EKG not needed      3. Medication changes include: no meds  Diagnoses and all orders for this visit:    1. Obesity, morbid, BMI 40.0-49.9 ([facility])   start LCD  cont exercise on bike  Cont plan for PT  Has ref to pool therapy by ortho  Use the recumbent bike at home  She goes to pool a few timws a week for pool exercise  Get appt w dietitian one on one between 7-14 days  2. Essential hypertension  Cont avapro 300 mg, hctz 12.5 mg per day and lasix 20 mg every day    No chest pain or SOB, or neuro symptoms  Recheck bp here on nurse check in 2 weeks then again in 1 month  As she starts the weight loss the bp will come down  3. Sleep apnea, obstructive  cpap use is improtant  4. Screening for diabetes mellitus  -     HEMOGLOBIN A1C WITH EAG; Future    5. Hypercholesterolemia  -     LIPID PANEL; Future    6. Screening for hypothyroidism  -     TSH 3RD GENERATION; Future           Based on his history, labs and EKG, Kamala Devine is  a good candidate for the [facility]      time with Paula consisted of counseling & coordinating and/or discussing treatment plans in reference to her obesity The primary encounter diagnosis was Obesity, morbid, BMI 40.0-49.9 ([facility] [address]  Diagnoses of Essential hypertension, Sleep apnea, obstructive, Screening for diabetes mellitus, Hypercholesterolemia, and Screening for hypothyroidism were also pertinent to this visit.

## 2022-05-10 NOTE — PATIENT INSTRUCTIONS
Keep ref to  therapy by ortho  Use the recumbent bike at home  Continue going  to pool a few timws a week for pool exercise  Get appt w dietitian one on one between 7-14 days

## 2022-05-10 NOTE — PROGRESS NOTES
New Patient. Weight Management. 1. Have you been to the ER, urgent care clinic since your last visit? Hospitalized since your last visit? No    2. Have you seen or consulted any other health care providers outside of the [address] [place] [address] since your last visit? Include any pap smears or colon screening.  No     BMI - 45.1

## 2022-05-11 ENCOUNTER — HOSPITAL ENCOUNTER (OUTPATIENT)
Dept: PHYSICAL THERAPY | Age: 67
Discharge: HOME | End: 2022-05-11
Payer: MEDICARE

## 2022-05-11 PROCEDURE — 97014 ELECTRIC STIMULATION THERAPY: CPT | Performed by: PHYSICAL THERAPIST

## 2022-05-11 PROCEDURE — 97110 THERAPEUTIC EXERCISES: CPT | Performed by: PHYSICAL THERAPIST

## 2022-05-11 NOTE — PROGRESS NOTES
PT DAILY TREATMENT NOTE - [facility] 2-15    Patient Name: Makenzie Ervin  Date:2022  : 1955  [x]  Patient  Verified  Payor: [facility] MEDICARE / Plan: [employer] / Product Type: Managed Care Medicare /    In time: 3:05pm Out time: 3:45pm  Total Treatment Time (min): 40  Total Timed Codes (min): 40  1:1 Treatment Time ( [address] only): 40  Visit #:  5    Treatment Area: Dorsalgia, unspecified [M54.9]    SUBJECTIVE  Pain Level (0-10 scale): 0  Any medication changes, allergies to medications, adverse drug reactions, diagnosis change, or new procedure performed?: [x] No    [] Yes (see summary sheet for update)  Subjective functional status/changes:   [] No changes reported  The patient reports that she still hasn't had any spasms for awhile, but just tender. OBJECTIVE    Modality rationale: decrease edema, decrease inflammation, decrease pain and increase tissue extensibility to improve the patients ability to perform daily activities.    Min Type Additional Details      15 [x] Estim: []Att   [x]Unatt    []TENS instruct                  [x]IFC  []Premod   []NMES                     []Other:  []w/US   []w/ice   [x]w/heat  Position: supine  Location: left shoulder for IFC; neck for MHP       []  Traction: [] Cervical       []Lumbar                       [] Prone          []Supine                       []Intermittent   []Continuous Lbs:  [] before manual  [] after manual  []w/heat    []  Ultrasound: []Continuous   [] Pulsed                       at: []1MHz   []3MHz Location:  W/cm2:    [] Paraffin         Location:   []w/heat    []  Ice     []  Heat  []  Ice massage Position:  Location:    []  Laser  []  Other: Position:  Location:      []  Vasopneumatic Device Pressure:       [] lo [] med [] hi   Temperature:      [x] Skin assessment post-treatment:  [x]intact []redness- no adverse reaction    []redness - adverse reaction:     25 min Therapeutic Exercise:  [x] See flow sheet :   Rationale: increase ROM, increase strength and improve coordination to improve the patients ability to perform daily activities. With   [x] TE   [] TA   [] Neuro   [] SC   [] other: Patient Education: [x] Review HEP    [x] Progressed/Changed HEP based on:   [x] positioning   [x] body mechanics   [] transfers   [] heat/ice application    [] other:      Other Objective/Functional Measures: none noted     Pain Level (0-10 scale) post treatment: 0    ASSESSMENT/Changes in Function:   The patient progressed with increased resistance and tolerance to therex. She will be reassessed next visit. Patient will continue to benefit from skilled PT services to modify and progress therapeutic interventions, address functional mobility deficits, address ROM deficits, address strength deficits, analyze and address soft tissue restrictions, analyze and cue movement patterns, analyze and modify body mechanics/ergonomics, assess and modify postural abnormalities, address imbalance/dizziness and instruct in home and community integration to attain remaining goals. [x]  See Plan of Care  []  See progress note/recertification  []  See Discharge Summary         Progress towards goals / Updated goals: The patient is progressing towards goals.      PLAN  [x]  Upgrade activities as tolerated     [x]  Continue plan of care  [x]  Update interventions per flow sheet       []  Discharge due to:_  []  Other:_      Amy Diaz, PT 5/11/2022

## 2022-05-12 ENCOUNTER — CLINICAL SUPPORT (OUTPATIENT)
Dept: SURGERY | Age: 67
End: 2022-05-12

## 2022-05-12 DIAGNOSIS — E66.01 OBESITY, MORBID, BMI 40.0-49.9 (HCC): Primary | ICD-10-CM

## 2022-05-12 LAB
CHOLEST SERPL-MCNC: 221 MG/DL (ref 100–199)
EST. AVERAGE GLUCOSE BLD GHB EST-MCNC: 108 MG/DL
HBA1C MFR BLD: 5.4 % (ref 4.8–5.6)
HDLC SERPL-MCNC: 75 MG/DL
LDLC SERPL CALC-MCNC: 130 MG/DL (ref 0–99)
TRIGL SERPL-MCNC: 93 MG/DL (ref 0–149)
TSH SERPL DL<=0.005 MIU/L-ACNC: 1.78 UIU/ML (ref 0.45–4.5)
VLDLC SERPL CALC-MCNC: 16 MG/DL (ref 5–40)

## 2022-05-12 NOTE — PROGRESS NOTES
[address] [place] [address] Weight Management Center  Metabolic Program Initial Nutrition Consult    Date: 2022   Physician: Debbie Espitia MD  Name: Chana Rodriguez  :  1955    Type of Plan: LCD  Weeks on Plan: day 2  Virtual visit was completed through [employer]. ASSESSMENT:      Medications/Supplements:   Prior to Admission medications    Medication Sig Start Date End Date Taking? Authorizing Provider   acetaminophen (Tylenol Arthritis Pain) 650 mg TbER Take 1,300 mg by mouth daily. Provider, Historical   meloxicam (MOBIC) 15 mg tablet TAKE 1 TABLET BY MOUTH EVERY DAY AS NEEDED FOR PAIN 22   AppJo Torres MD   hydroCHLOROthiazide (HYDRODIURIL) 12.5 mg tablet TAKE 1 TABLET BY MOUTH EVERY DAY 22   Jo Victor MD   furosemide (LASIX) 20 mg tablet TAKE 1 TABLET BY MOUTH DAILY AS NEEDED 10/8/21   Appjohnie Aguilar MD   irbesartan (AVAPRO) 300 mg tablet Take 1 Tablet by mouth nightly. 21   Appa Radha Aguilar MD   YUVAFEM 10 mcg tab vaginal tablet Insert 10 mcg into vagina two (2) times a week. 3/7/18   Provider, Historical   Comp. Stocking,Knee,Regular,Lrg misc 20-30mmHG 16   Deirdre Zuñiga NP   multivitamin (ONE A DAY) tablet Take 1 tablet by mouth daily. Provider, Historical     Anthropometrics:    Ht:61\"   Wt: 237#    IBW: 105#  %IBW:  226%    BMI:44    Category: obesity class 3    Pt presents today for initial nutrition consult for the [address] [place] [address] Weight Management [facility] [employer].  Here for pre-surgical weight loss for knee replacement, surgeon's goal is 210#. Exercise/Physical Activity: 2x week physical therapist for neck and 2x week for knee. Pool 1 day a week    Started meal replacements: yes  If yes, how many per day: 2    Aversions/side effects to meal replacements: none reported    Reported Diet History:  Beverages: 64 ounces water every day.   Pre-program not as much water and more tea with stevia (1 cup) and coffee - stevia, no sugar creamer (1 cup)    Meals pre-program: fast food, lots of chick marley a salads. No burgers and fries. Meal skipped, usually breakfast, totaling 1-2 meals daily. Prefers quick and easy, does not want to cook. Grab and go, instant oatmeal, pre-packaged items. 24 Hour Diet Recall  Breakfast  ND shake   Lunch  ND shake   Dinner  Salad with tomatoes, cabbage, kale. grilled chicken, no dressing - avocado ranch or balsamic    Snacks  No snack   Beverages  water       Environment/Psychosocial/Support: not reported    NUTRITION INTERVENTION:  Pt educated on nutrition recommendations for LCD, specifically 2 meal replacements every day plus a grocery meal and snack. Daily recommended totals: 1200 calories, 60 grams carbs, 80+ grams protein, and remaining calories as healthy fats. Use LCD handout for meal and snack suggestions and preparation. Grocery meal:  Use the balanced plate method to plan meals, include 3-6 oz of lean source of protein, unlimited non-starchy vegetables, 1/2 cup whole grains/beans OR 1/2 cup fruit OR 1 serving of low fat dairy. Utilize handouts listing healthy snack and meal ideas. Read all nutrition labels. Demonstrated and emphasized identifying serving size, total fat, sugar and protein content. Defined low fat as </= 3 g per serving. Discussed lean and extra lean sources of protein. Provided list of low fat cooking methods. Avoid foods with sugar listed in the first 3 ingredients and >/10 g sugar per serving. Practice mindful eating habits; take small bites, chew thoroughly, avoid distractions, utilize hunger/fullness scale. Attend Metabolic Weight Loss Class and Support Group and increase physical activity (approved per MD) for long term weight maintenance. NUTRITION MONITORING AND EVALUATION: doesn't want to be in ketosis, has previously completed a weight loss program with shakes that resulted in ketosis and she reports it made her feel awful.   She prefers to do the LCD plan with adding a complex carb at each meal and snack daily to raise the total carbs per day to prevent ketosis. Discussed complex carb choices and serving sizes using the LCD handout. Encouraged her to have 1-2 servings of complex carb with her grocery meal that included a lean protein or healthy fat. Also 1-2 servings at snack time (if not choosing a ND bar or shake for snack), to assist with keeping carbs closer to 80 grams total per day. This will still result in weight loss but likely slower and may include some hunger pains. Pt motivated, making appropriate nutrition/lifestyle changes. Followup as needed. The following goals were established with patient;  1) home  fresh meals in place of fast food salads some days  2) greek yogurt dip with ranch seasoning or onion packet as a dressing alternative for salads and vegetables  3) add 1-2 complex carb servings for meal and snack (totaling 30 grams total carbs each per meal and snack) using LCD handout on page three for suggestions and serving sizes. 30 grams carb for one meal + 2 ND shakes totaling 20 grams carbs + 30 grams carb for one snack = 80 grams total carbs  4) water 64 ounces every day  5) great job on increased movement with physical therapy  6) try having tea without stevia occasionally if missing a glass of tea  7) can add baking spices or PB2 to shakes PRN  8) followup as needed. Specific tips and techniques to facilitate compliance with above recommendations were provided and discussed. If further details are desired please contact me at [phone]. This phone number was also provided to the patient for any further questions or concerns.           Ana Garcia, MS, RD, LDN

## 2022-05-18 ENCOUNTER — HOSPITAL ENCOUNTER (OUTPATIENT)
Dept: PHYSICAL THERAPY | Age: 67
Discharge: HOME | End: 2022-05-18
Payer: MEDICARE

## 2022-05-18 PROCEDURE — 97110 THERAPEUTIC EXERCISES: CPT | Performed by: PHYSICAL THERAPIST

## 2022-05-18 PROCEDURE — 97014 ELECTRIC STIMULATION THERAPY: CPT | Performed by: PHYSICAL THERAPIST

## 2022-05-18 NOTE — PROGRESS NOTES
PT DAILY TREATMENT NOTE - [facility] 2-15    Patient Name: Priya Alfaro  Date:2022  : 1955  [x]  Patient  Verified  Payor: [employer] MEDICARE / Plan: [employer] / Product Type: Managed Care Medicare /    In time: 3:01pm Out time: 3:51pm  Total Treatment Time (min): 50  Total Timed Codes (min): 35  1:1 Treatment Time ( [address] only): 35  Visit #:  6    Treatment Area: Dorsalgia, unspecified [M54.9]    SUBJECTIVE  Pain Level (0-10 scale): 0  Any medication changes, allergies to medications, adverse drug reactions, diagnosis change, or new procedure performed?: [x] No    [] Yes (see summary sheet for update)  Subjective functional status/changes:   [] No changes reported  The patient reports that she still hasn't had any muscle spasms and is feeling pretty good. OBJECTIVE    Modality rationale: decrease edema, decrease inflammation, decrease pain and increase tissue extensibility to improve the patients ability to perform daily activities.    Min Type Additional Details      15 [x] Estim: []Att   [x]Unatt    []TENS instruct                  [x]IFC  []Premod   []NMES                     []Other:  []w/US   []w/ice   [x]w/heat  Position: supine  Location: left shoulder for IFC; neck for MHP       []  Traction: [] Cervical       []Lumbar                       [] Prone          []Supine                       []Intermittent   []Continuous Lbs:  [] before manual  [] after manual  []w/heat    []  Ultrasound: []Continuous   [] Pulsed                       at: []1MHz   []3MHz Location:  W/cm2:    [] Paraffin         Location:   []w/heat    []  Ice     []  Heat  []  Ice massage Position:  Location:    []  Laser  []  Other: Position:  Location:      []  Vasopneumatic Device Pressure:       [] lo [] med [] hi   Temperature:      [x] Skin assessment post-treatment:  [x]intact []redness- no adverse reaction    []redness - adverse reaction:     35 min Therapeutic Exercise:  [x] See flow sheet :   Rationale: increase ROM, increase strength and improve coordination to improve the patients ability to perform daily activities. With   [x] TE   [] TA   [] Neuro   [] SC   [] other: Patient Education: [x] Review HEP    [x] Progressed/Changed HEP based on:   [x] positioning   [x] body mechanics   [] transfers   [] heat/ice application    [] other:      Other Objective/Functional Measures: FOTO= 82 (69 at eval)     Pain Level (0-10 scale) post treatment: 0    ASSESSMENT/Changes in Function:   The patient has progressed well and MET goals and will be discharged to her HEP. []  See Plan of Care  []  See progress note/recertification  [x]  See Discharge Summary         Progress towards goals / Updated goals: The patient has MET or is progressing towards goals. PLAN  []  Upgrade activities as tolerated     []  Continue plan of care  []  Update interventions per flow sheet       [x]  Discharge due to: Pt. Has MET goals.    []  Other:_      Guera Kennedy, PT 5/18/2022

## 2022-05-18 NOTE — PROGRESS NOTES
[address] Physical Therapy  [address] [facility] [address] [address] [place]  Phone: [phone] Fax: [phone]    Discharge Summary 2-15    Patient name: Joaquín White  : 1955  Provider#: [id number]  Referral source: Ney Dixon MD      Medical/Treatment Diagnosis: Dorsalgia, unspecified [M54.9]     Prior Hospitalization: see medical history     Comorbidities: See Plan of Care  Prior Level of Function: See Plan of Care  Medications: Verified on Patient Summary List    Start of Care: 22      Onset Date: chronic   Visits from Start of Care: 6     Missed Visits: 0  Reporting Period : 22 to 22    Assessment/Summary of care: The patient has progressed well with her neck pain (cervical DDD) and left shoulder/AC joint pain (arthritis and type 2 acromion process). She initially was having muscle spasms multiple times per day, but reports that she has not had any spasms for at least 1-2 weeks. Her cervical AROM rotation has improved as follows: R= 65 (45 at eval); L= 65 (45, p! At eval). She has a safe and progressive HEP that she will continue to utilize to maintain improvements made thus far independently. Short Term Goals: To be accomplished in 2 treatments:                         0.) The patient will be independent with their HEP consistently for at least one week. - MET  Long Term Goals: To be accomplished in 16 treatments:                         5.) The patient will have at most 6/10 pain with daily activities. - MET                         9.) The patient will have at least one day with at most one instance of muscle spasm.- MET                         4.) The patient will improve their FOTO score from 69 to at least 71 to show improvements in functional mobility.- MET (82 today)                         4.) The patient will improve her cervical rotation AROM to at least 50 degrees bilaterally to assist with daily activities. - MET        RECOMMENDATIONS:  [x]Discontinue therapy: [x]Patient has reached or is progressing toward set goals     []Patient is non-compliant or has abdicated     []Due to lack of appreciable progress towards set goals     []Other  Sil Yu, PT 5/18/2022

## 2022-05-20 ENCOUNTER — APPOINTMENT (OUTPATIENT)
Dept: PHYSICAL THERAPY | Age: 67
End: 2022-05-20
Payer: MEDICARE

## 2022-05-23 ENCOUNTER — CLINICAL SUPPORT (OUTPATIENT)
Dept: SURGERY | Age: 67
End: 2022-05-23

## 2022-05-23 ENCOUNTER — APPOINTMENT (OUTPATIENT)
Dept: PHYSICAL THERAPY | Age: 67
End: 2022-05-23
Payer: MEDICARE

## 2022-05-23 VITALS
BODY MASS INDEX: 44.22 KG/M2 | OXYGEN SATURATION: 97 % | DIASTOLIC BLOOD PRESSURE: 90 MMHG | RESPIRATION RATE: 20 BRPM | TEMPERATURE: 98.7 F | HEIGHT: 61 IN | SYSTOLIC BLOOD PRESSURE: 142 MMHG | HEART RATE: 77 BPM | WEIGHT: 234.2 LBS

## 2022-05-23 DIAGNOSIS — I10 ESSENTIAL HYPERTENSION: ICD-10-CM

## 2022-05-23 DIAGNOSIS — G47.33 SLEEP APNEA, OBSTRUCTIVE: ICD-10-CM

## 2022-05-23 DIAGNOSIS — E66.01 OBESITY, MORBID, BMI 40.0-49.9 (HCC): Primary | ICD-10-CM

## 2022-05-23 NOTE — PROGRESS NOTES
1. Have you been to the ER, urgent care clinic since your last visit? Hospitalized since your last visit? No    2. Have you seen or consulted any other health care providers outside of the [address] [place] [address] since your last visit? Include any pap smears or colon screening. No     Pt states she takes her blood pressure medication at night. Pt denies arm pain, chest pain, burry vision, dizziness and swelling legs. Writer reported pt blood pressure to Dr. Daria Blum. Progress Note: Weekly Education Class in the [facility]         Patient is on Very Low Calorie Diet [] (4 meal replacements per day, 800 kcal/day)      Low Calorie Diet [] (2-3 meal replacements per day, 7638-3078 kcal/day)    1) Did patient have any new symptoms or physical problems? Yes []    No [x]    If yes, check & comment: weakness [], fatigue [], lightheadedness [], headache [], cramps [], cold intolerance [], hair loss [], diarrhea [], constipation [],  NA [] other:           2) Has patient had any medical attention from other providers, urgent care or the emergency room this week? Yes []  No [x]       NA [], If yes, why:        3) Any other sugar sweetened beverages consumed this week? Yes []  No [x]    4) Did patient have any problems adhering to the diet? Yes []  No [x] NA []    If yes, Vacation [], Celebrations [], Conferences [], Family Reunions [] other:               5) How many hours of sleep this week?     (range)7-9  NA []    Number of meal replacements consumed daily? 2 (range)  NA []    Average ounces of water patient consumed daily this week (not including shakes)? 52 (divide the weekly total by 7)    Did you eat any food outside of the program? Yes [] No [x]    Physical Activity Over the Past Week:    Cardio exercise: n/a min  Strength exercise: n/a workouts / week  Number of steps walked per day: \"2Hrs\"    How has patient mood overall been this week?  Sad [], Happy [], Stressed [], Tired [x], Content [], NA [], other           Medications reconciled by nurse Yes [x]  No[]    Patient was given therapeutic recommendations for any noted side effects of their dietary approach based upon [facility] patient manual per providers recommendation. Progress Note: Weekly Education Class in the [facility] Weight Loss Program         Patient is on Very Low Calorie Diet [] (4 meal replacements per day, 800 kcal/day)      Low Calorie Diet [x] (2-3 meal replacements per day, 7002-2734 kcal/day)    1) Did patient have any new symptoms or physical problems? Yes [x]    No []    If yes, check & comment: weakness [], fatigue [x], lightheadedness [], headache [], cramps [x], cold intolerance [x], hair loss [x], diarrhea [], constipation [],  NA [] other:      2) Has patient had any medical attention from other providers, urgent care or the emergency room this week? Yes []  No [x]       NA [], If yes, why:        3) Any other sugar sweetened beverages consumed this week? Yes [x]  No []    4) Did patient have any problems adhering to the diet? Yes []  No [x] NA []    If yes, Vacation [], Celebrations [], Conferences [], Family Reunions [] other         5) How many hours of sleep this week? 7-8    (range)  NA []    Number of meal replacements consumed daily? 2 (range)  NA []    Average ounces of water patient consumed daily this week (not including shakes)? 69     (divide the weekly total by 7)    Did you eat any food outside of the program? Yes [x] No []    Physical Activity Over the Past Week:    Cardio exercise: 35 min  Strength exercise: 2 workouts / week  Number of steps walked per day: n/a    How has patient mood overall been this week?  Sad [], Happy [], Stressed [], Tired [x], Content [x], NA [], other            Medications reconciled by nurse Yes [x]  No[]    Patient was given therapeutic recommendations for any noted side effects of their dietary approach based upon [facility] patient manual per providers recommendation.

## 2022-05-25 ENCOUNTER — OFFICE VISIT (OUTPATIENT)
Dept: SURGERY | Age: 67
End: 2022-05-25

## 2022-05-25 ENCOUNTER — APPOINTMENT (OUTPATIENT)
Dept: PHYSICAL THERAPY | Age: 67
End: 2022-05-25
Payer: MEDICARE

## 2022-05-25 DIAGNOSIS — E66.01 OBESITY, MORBID, BMI 40.0-49.9 (HCC): Primary | ICD-10-CM

## 2022-05-26 DIAGNOSIS — I10 ESSENTIAL HYPERTENSION: ICD-10-CM

## 2022-05-26 RX ORDER — IRBESARTAN 300 MG/1
TABLET ORAL
Qty: 90 TABLET | Refills: 1 | Status: SHIPPED | OUTPATIENT
Start: 2022-05-26

## 2022-05-26 NOTE — PROGRESS NOTES
[facility]  Metabolic Weight Loss Program        Patient's Name: Albaro Moyer  : 1955    This patient is enrolled in [address] [place] [address] Weight Loss Program and attended the required weekly virtual nutrition class hosted via [employer].       Ruben Jay, RD

## 2022-05-27 DIAGNOSIS — M17.10 PRIMARY OSTEOARTHRITIS OF KNEE, UNSPECIFIED LATERALITY: ICD-10-CM

## 2022-05-27 RX ORDER — MELOXICAM 15 MG/1
TABLET ORAL
Qty: 30 TABLET | Refills: 0 | Status: SHIPPED | OUTPATIENT
Start: 2022-05-27 | End: 2022-06-28

## 2022-05-31 ENCOUNTER — APPOINTMENT (OUTPATIENT)
Dept: PHYSICAL THERAPY | Age: 67
End: 2022-05-31
Payer: MEDICARE

## 2022-05-31 NOTE — PROGRESS NOTES
The cholesterol is higher than it was 8 months ago  We will repeat in 3 months.  It will likely improve with the weight management changes

## 2022-06-01 ENCOUNTER — OFFICE VISIT (OUTPATIENT)
Dept: SURGERY | Age: 67
End: 2022-06-01

## 2022-06-01 DIAGNOSIS — E66.01 OBESITY, MORBID, BMI 40.0-49.9 (HCC): Primary | ICD-10-CM

## 2022-06-01 NOTE — PROGRESS NOTES
Nurse note from patient's weekly  / LCD / Maintenance class was reviewed. Pertinent medical concerns were:   bp better but still high     BP Readings from Last 3 Encounters:   05/23/22 (!) 142/90   05/10/22 (!) 186/93   04/14/22 (!) 164/78       Weight Metrics 5/23/2022 5/10/2022 5/10/2022 4/14/2022 9/13/2021 7/2/2020 3/9/2020   Weight 234 lb 3.2 oz - 237 lb 14.4 oz 236 lb 233 lb 240 lb 230 lb   Neck Circ (inches) - 15.25 - - - - -   Waist Measure Inches - 46.75 - - - - -   Body Fat % - 48.9 - - - - -   BMI 44.25 kg/m2 - 44.95 kg/m2 44.59 kg/m2 45.5 kg/m2 46.87 kg/m2 44.92 kg/m2       Current Outpatient Medications   Medication Sig Dispense Refill    acetaminophen (Tylenol Arthritis Pain) 650 mg TbER Take 1,300 mg by mouth daily.  hydroCHLOROthiazide (HYDRODIURIL) 12.5 mg tablet TAKE 1 TABLET BY MOUTH EVERY DAY 90 Tablet 2    YUVAFEM 10 mcg tab vaginal tablet Insert 10 mcg into vagina two (2) times a week.  Comp. Stocking,Knee,Regular,Lrg misc 20-30mmHG 1 Units 2    multivitamin (ONE A DAY) tablet Take 1 tablet by mouth daily.       meloxicam (MOBIC) 15 mg tablet TAKE 1 TABLET BY MOUTH EVERY DAY AS NEEDED FOR PAIN 30 Tablet 0    irbesartan (AVAPRO) 300 mg tablet TAKE 1 TABLET BY MOUTH EVERY DAY AT NIGHT 90 Tablet 1    furosemide (LASIX) 20 mg tablet TAKE 1 TABLET BY MOUTH DAILY AS NEEDED (Patient not taking: Reported on 5/23/2022) 30 Tablet 1

## 2022-06-02 NOTE — PROGRESS NOTES
[facility]  Metabolic Weight Loss Program        Patient's Name: Mya Gautam  : 1955    This patient is enrolled in [address] [place] [address] Weight Loss Program and attended the required weekly virtual nutrition class hosted via [employer] today.       Carson Barrios RD

## 2022-06-06 ENCOUNTER — CLINICAL SUPPORT (OUTPATIENT)
Dept: SURGERY | Age: 67
End: 2022-06-06

## 2022-06-06 VITALS
HEART RATE: 73 BPM | WEIGHT: 234.1 LBS | BODY MASS INDEX: 44.2 KG/M2 | RESPIRATION RATE: 18 BRPM | OXYGEN SATURATION: 96 % | SYSTOLIC BLOOD PRESSURE: 168 MMHG | TEMPERATURE: 98 F | DIASTOLIC BLOOD PRESSURE: 91 MMHG | HEIGHT: 61 IN

## 2022-06-06 DIAGNOSIS — I10 ESSENTIAL HYPERTENSION: ICD-10-CM

## 2022-06-06 DIAGNOSIS — G47.33 SLEEP APNEA, OBSTRUCTIVE: ICD-10-CM

## 2022-06-06 DIAGNOSIS — E66.01 OBESITY, MORBID, BMI 40.0-49.9 (HCC): Primary | ICD-10-CM

## 2022-06-06 RX ORDER — ZINC GLUCONATE 10 MG
1 LOZENGE ORAL
COMMUNITY

## 2022-06-06 NOTE — PROGRESS NOTES
Week # 1    Progress Note: Weekly Education Class in the [facility] Weight Loss Program         Patient is on Very Low Calorie Diet [] (4 meal replacements per day, 800 kcal/day)      Low Calorie Diet [x] (2-3 meal replacements per day, 3202-2357 kcal/day)    1) Did patient have any new symptoms or physical problems? Yes [x]    No []    If yes, check & comment: weakness [], fatigue [], lightheadedness [], headache [x], cramps [x], cold intolerance [x], hair loss [], diarrhea [], constipation [],  NA [] other:                                 2) Has patient had any medical attention from other providers, urgent care or the emergency room this week? Yes []  No [x]       NA [], If yes, why:                                       3) Any other sugar sweetened beverages consumed this week? Yes []  No []NOT ANSWERED      4) Did patient have any problems adhering to the diet? Yes []  No [x] NA []    If yes, Vacation [], Celebrations [], Conferences [], Family Reunions [] other:                                                5) How many hours of sleep this week? 7-8    (range)  NA []    Number of meal replacements consumed daily? 2  (range)  NA []    Average ounces of water patient consumed daily this week (not including shakes)? 72     (divide the weekly total by 7)    Did you eat any food outside of the program? Yes [x] No []    Physical Activity Over the Past Week:    Cardio exercise: 60 min  Strength exercise: 0 workouts / week  Number of steps walked per day: 0    How has patient mood overall been this week? Sad [], Happy [], Stressed [], Tired [], Content [x], NA [], other            Medications reconciled by nurse Yes [x]  No[]    Patient was given therapeutic recommendations for any noted side effects of their dietary approach based upon [facility] patient manual per providers recommendation.      Week # 2    Progress Note: Weekly Education Class in the [facility] Weight Loss Program         Patient is on Very Low Calorie Diet [] (4 meal replacements per day, 800 kcal/day)      Low Calorie Diet [x] (2-3 meal replacements per day, 9465-4240 kcal/day)    1) Did patient have any new symptoms or physical problems? Yes [x]    No []    If yes, check & comment: weakness [], fatigue [], lightheadedness [], headache [], cramps [], cold intolerance [x], hair loss [], diarrhea [], constipation [],  NA [] other:                                 2) Has patient had any medical attention from other providers, urgent care or the emergency room this week? Yes []  No [x]       NA [], If yes, why:                                       3) Any other sugar sweetened beverages consumed this week? Yes []  No [x]    4) Did patient have any problems adhering to the diet? Yes []  No [x] NA []    If yes, Vacation [], Celebrations [], Conferences [], Family Reunions [] other:                                                5) How many hours of sleep this week? 6.5-8.5    (range)  NA []    Number of meal replacements consumed daily? 2  (range)  NA []    Average ounces of water patient consumed daily this week (not including shakes)? 66     (divide the weekly total by 7)    Did you eat any food outside of the program? Yes [x] No []    Physical Activity Over the Past Week:    Cardio exercise: 45 min  Strength exercise: 1 workouts / week  Number of steps walked per day: 0    How has patient mood overall been this week? Sad [], Happy [], Stressed [], Tired [], Content [x], NA [], other            Medications reconciled by nurse Yes [x]  No[]    Patient was given therapeutic recommendations for any noted side effects of their dietary approach based upon [facility] patient manual per providers recommendation.

## 2022-06-06 NOTE — PROGRESS NOTES
Weight Management. 1. Have you been to the ER, urgent care clinic since your last visit? Hospitalized since your last visit? No    2. Have you seen or consulted any other health care providers outside of the [address] [place] [address] since your last visit? Include any pap smears or colon screening.  No

## 2022-06-08 ENCOUNTER — OFFICE VISIT (OUTPATIENT)
Dept: SURGERY | Age: 67
End: 2022-06-08

## 2022-06-08 DIAGNOSIS — E66.01 OBESITY, MORBID, BMI 40.0-49.9 (HCC): Primary | ICD-10-CM

## 2022-06-09 NOTE — PROGRESS NOTES
[facility]  Metabolic Weight Loss Program        Patient's Name: Jeane Magana  : 1955    This patient is enrolled in [address] [place] [address] Weight Loss Program and attended the required weekly virtual nutrition class hosted via [address] [place] [address] today.       Timoteo Conner RD

## 2022-06-15 ENCOUNTER — OFFICE VISIT (OUTPATIENT)
Dept: SURGERY | Age: 67
End: 2022-06-15

## 2022-06-15 DIAGNOSIS — E66.01 OBESITY, MORBID, BMI 40.0-49.9 (HCC): Primary | ICD-10-CM

## 2022-06-16 NOTE — PROGRESS NOTES
[facility]  Metabolic Weight Loss Program        Patient's Name: Minor Sparks  : 1955    This patient is enrolled in [address] [place] [address] Weight Loss Program and attended the required weekly virtual nutrition class hosted via [address] [place] [address] today.       María Rao, MS, RD, LDN

## 2022-06-22 ENCOUNTER — OFFICE VISIT (OUTPATIENT)
Dept: SURGERY | Age: 67
End: 2022-06-22
Payer: MEDICARE

## 2022-06-22 VITALS
OXYGEN SATURATION: 96 % | HEART RATE: 80 BPM | HEIGHT: 61 IN | RESPIRATION RATE: 18 BRPM | SYSTOLIC BLOOD PRESSURE: 165 MMHG | WEIGHT: 227.1 LBS | BODY MASS INDEX: 42.88 KG/M2 | TEMPERATURE: 98 F | DIASTOLIC BLOOD PRESSURE: 92 MMHG

## 2022-06-22 DIAGNOSIS — E66.01 OBESITY, MORBID, BMI 40.0-49.9 (HCC): Primary | ICD-10-CM

## 2022-06-22 DIAGNOSIS — G47.33 SLEEP APNEA, OBSTRUCTIVE: ICD-10-CM

## 2022-06-22 DIAGNOSIS — I10 ESSENTIAL HYPERTENSION: ICD-10-CM

## 2022-06-22 DIAGNOSIS — R11.0 POSTPRANDIAL NAUSEA: ICD-10-CM

## 2022-06-22 DIAGNOSIS — E78.00 HYPERCHOLESTEROLEMIA: ICD-10-CM

## 2022-06-22 PROCEDURE — G8399 PT W/DXA RESULTS DOCUMENT: HCPCS | Performed by: FAMILY MEDICINE

## 2022-06-22 PROCEDURE — G8536 NO DOC ELDER MAL SCRN: HCPCS | Performed by: FAMILY MEDICINE

## 2022-06-22 PROCEDURE — G9899 SCRN MAM PERF RSLTS DOC: HCPCS | Performed by: FAMILY MEDICINE

## 2022-06-22 PROCEDURE — G8427 DOCREV CUR MEDS BY ELIG CLIN: HCPCS | Performed by: FAMILY MEDICINE

## 2022-06-22 PROCEDURE — 99214 OFFICE O/P EST MOD 30 MIN: CPT | Performed by: FAMILY MEDICINE

## 2022-06-22 PROCEDURE — 3017F COLORECTAL CA SCREEN DOC REV: CPT | Performed by: FAMILY MEDICINE

## 2022-06-22 PROCEDURE — 1090F PRES/ABSN URINE INCON ASSESS: CPT | Performed by: FAMILY MEDICINE

## 2022-06-22 PROCEDURE — 1123F ACP DISCUSS/DSCN MKR DOCD: CPT | Performed by: FAMILY MEDICINE

## 2022-06-22 PROCEDURE — 1101F PT FALLS ASSESS-DOCD LE1/YR: CPT | Performed by: FAMILY MEDICINE

## 2022-06-22 PROCEDURE — G8753 SYS BP > OR = 140: HCPCS | Performed by: FAMILY MEDICINE

## 2022-06-22 PROCEDURE — G8417 CALC BMI ABV UP PARAM F/U: HCPCS | Performed by: FAMILY MEDICINE

## 2022-06-22 PROCEDURE — G8432 DEP SCR NOT DOC, RNG: HCPCS | Performed by: FAMILY MEDICINE

## 2022-06-22 PROCEDURE — G8755 DIAS BP > OR = 90: HCPCS | Performed by: FAMILY MEDICINE

## 2022-06-22 NOTE — PROGRESS NOTES
Week # 1    Progress Note: Weekly Education Class in the [facility] Weight Loss Program         Patient is on Very Low Calorie Diet [] (4 meal replacements per day, 800 kcal/day)      Low Calorie Diet [x] (2-3 meal replacements per day, 8387-9113 kcal/day)    1) Did patient have any new symptoms or physical problems? Yes []    No []    If yes, check & comment: weakness [], fatigue [], lightheadedness [], headache [], cramps [], cold intolerance [x], hair loss [], diarrhea [], constipation [],  NA [] other:                                 2) Has patient had any medical attention from other providers, urgent care or the emergency room this week? Yes []  No [x]       NA [], If yes, why:                                      3) Any other sugar sweetened beverages consumed this week? Yes []  No [x]    4) Did patient have any problems adhering to the diet? Yes [x]  No [] NA []    If yes, Vacation [], Celebrations [], Conferences [], Family Reunions [] other:                                                5) How many hours of sleep this week? 7-8     (range)  NA []    Number of meal replacements consumed daily? 2  (range)  NA []    Average ounces of water patient consumed daily this week (not including shakes)? 63     (divide the weekly total by 7)    Did you eat any food outside of the program? Yes [x] No []    Physical Activity Over the Past Week:    Cardio exercise: 70 min  Strength exercise: 1 workouts / week  Number of steps walked per day: 0    How has patient mood overall been this week? Sad [], Happy [], Stressed [], Tired [], Content [x], NA [], other            Medications reconciled by nurse Yes [x]  No[]    Patient was given therapeutic recommendations for any noted side effects of their dietary approach based upon [facility] patient manual per providers recommendation.      Week #2    Progress Note: Weekly Education Class in the [facility] Weight Loss Program         Patient is on Very Low Calorie Diet [] (4 meal replacements per day, 800 kcal/day)      Low Calorie Diet [x] (2-3 meal replacements per day, 9173-3888 kcal/day)    1) Did patient have any new symptoms or physical problems? Yes [x]    No []    If yes, check & comment: weakness [], fatigue [], lightheadedness [], headache [], cramps [], cold intolerance [x], hair loss [], diarrhea [], constipation [],  NA [] other:                                 2) Has patient had any medical attention from other providers, urgent care or the emergency room this week? Yes []  No [x]       NA [], If yes, why:                                      3) Any other sugar sweetened beverages consumed this week? Yes [x]  No []    4) Did patient have any problems adhering to the diet? Yes [x]  No [] NA []    If yes, Vacation [], Celebrations [], Conferences [], Family Reunions [] other:                                                5) How many hours of sleep this week? 7-9.5    (range)  NA []    Number of meal replacements consumed daily? 2 (range)  NA []    Average ounces of water patient consumed daily this week (not including shakes)? 64     (divide the weekly total by 7)    Did you eat any food outside of the program? Yes [x] No []    Physical Activity Over the Past Week:    Cardio exercise: 0 min  Strength exercise: 0 workouts / week  Number of steps walked per day: 0    How has patient mood overall been this week? Sad [], Happy [], Stressed [], Tired [], Content [x], NA [], other            Medications reconciled by nurse Yes [x]  No[]    Patient was given therapeutic recommendations for any noted side effects of their dietary approach based upon [facility] patient manual per providers recommendation.

## 2022-06-22 NOTE — PROGRESS NOTES
[facility] Weight Loss Program Progress Note:   F/up Physician Visit    CC: Weight Management      Kamala Devine is a 78 y.o. female who is here for her  f/up physician visit for the  LCD Program.    She c/o feeling nauseated after having her first shake and then feels nauseated the rest of the day  She says today she feels nauseated and has only had water    She had pain in the midepigastric area in the past and had an upper GI that revealed an early ulcer per the patient   she has many BM each day , usually 3-4 times a day  And is formed  She has no pain just nausea    So far she has lost 10 lbs this month on the new [facility] products  She has one meal a day. She denies any pain when she eats the regular food or the replacements                              Weight Metrics 6/22/2022 6/22/2022 6/6/2022 5/23/2022 5/10/2022 5/10/2022 4/14/2022   Weight - 227 lb 1.6 oz 234 lb 1.6 oz 234 lb 3.2 oz - 237 lb 14.4 oz 236 lb   Neck Circ (inches) 15.5 - - - 15.25 - -   Waist Measure Inches 44.75 - - - 46.75 - -   Body Fat % 47.9 - - - 48.9 - -   BMI - 42.91 kg/m2 44.23 kg/m2 44.25 kg/m2 - 44.95 kg/m2 44.59 kg/m2         Outpatient Medications Marked as Taking for the 6/22/22 encounter (Office Visit) with Alta Cuevas MD   Medication Sig Dispense Refill    magnesium 250 mg tab Take 1 Tablet by mouth nightly.  meloxicam (MOBIC) 15 mg tablet TAKE 1 TABLET BY MOUTH EVERY DAY AS NEEDED FOR PAIN 30 Tablet 0    irbesartan (AVAPRO) 300 mg tablet TAKE 1 TABLET BY MOUTH EVERY DAY AT NIGHT 90 Tablet 1    acetaminophen (Tylenol Arthritis Pain) 650 mg TbER Take 1,300 mg by mouth daily.  hydroCHLOROthiazide (HYDRODIURIL) 12.5 mg tablet TAKE 1 TABLET BY MOUTH EVERY DAY 90 Tablet 2    furosemide (LASIX) 20 mg tablet TAKE 1 TABLET BY MOUTH DAILY AS NEEDED 30 Tablet 1    YUVAFEM 10 mcg tab vaginal tablet Insert 10 mcg into vagina two (2) times a week.  Comp. Stocking,Knee,Regular,Lrg misc 20-30mmHG 1 Units 2    multivitamin (ONE A DAY) tablet Take 1 tablet by mouth daily. Participation   Did you attend clinic and class last week? yes    Review of Systems  Since your last visit, have you experienced any complications? no  If yes, please list:       Are you taking an appetite suppressant? no  If so, is there any Chest Pain, Palpitations or Dizziness? HUNGER CONTROL: good    BP Readings from Last 3 Encounters:   06/22/22 (!) 165/92   06/06/22 (!) 168/91   05/23/22 (!) 142/90       SLEEP:3-4 hrs on cpap    Have you received any other medical care this week? no  If yes, where and for what? Have you discontinued or changed any medicine or dose of your medicine since your last visit with Dr Mat Nava? no  If yes, where and for what? Diet  How many ounces of calorie-free liquids did you consume each day?   72oz    How many meal replacements did you take each day? 2 and a meal    Did you have any problems adhering to the program?  no If yes, please explain:      Exercise  Aerobic exercise: 60 min once a week  Resistance exercise:  workouts / week  Any discomfort?  no     If yes, where? Objective  Visit Vitals  BP (!) 165/92 (BP 1 Location: Right arm, BP Patient Position: Sitting, BP Cuff Size: Adult long)   Pulse 80   Temp 98 °F (36.7 °C) (Oral)   Resp 18   Ht 5' 1\" (1.549 m)   Wt 227 lb 1.6 oz (103 kg)   SpO2 96%   BMI 42.91 kg/m²     No LMP recorded. Patient has had a hysterectomy. Physical Exam  Appearance: well,   Mental:A&O x 3, NAD  H:NC/AT,  EENT:   EOMI, PERRL, No scleral icterus  Neck: no bruit or JVD  Lung: clear, No W/R  ABD: soft, active, nontender  Ext:  no Edema  Neuro: nonfocal  Assessment / Plan    Encounter Diagnoses   Name Primary?  Obesity, morbid, BMI 40.0-49.9 (HCC) Yes    Postprandial nausea     Essential hypertension     Sleep apnea, obstructive     Hypercholesterolemia      Diagnoses and all orders for this visit:    1.  Obesity, morbid, BMI 40.0-49.9 (HCC)  Cont the program and cont working with the dietitian to sort out an assocition w foods and the nausea  2. Postprandial nausea  -     LIPASE; Future  -     METABOLIC PANEL, COMPREHENSIVE; Future    3. Essential hypertension   Cont the irbesartan 300 mg ea day and hctz 12.5 ea day  And lasix 20 mg daily as needed for edema  4. Sleep apnea, obstructive  Cont the cpap  5. Hypercholesterolemia  On no meds  The exercise and diet changes will help lower the lipids  Other orders  -     METABOLIC PANEL, COMPREHENSIVE  -     LIPASE      1. Weight management improved   Progress was reviewed with patient    2. Labs    Latest results reviewed with patient       face to face time with Paula consisted of counseling & coordinating and/or discussing treatment plans in reference to her obesity The primary encounter diagnosis was Obesity, morbid, BMI 40.0-49.9 ([address] Diagnoses of Postprandial nausea, Essential hypertension, Sleep apnea, obstructive, and Hypercholesterolemia were also pertinent to this visit.

## 2022-06-22 NOTE — PROGRESS NOTES
Weight Management. 1 month follow up. 1. Have you been to the ER, urgent care clinic since your last visit? Hospitalized since your last visit? No    2. Have you seen or consulted any other health care providers outside of the [address] [place] [address] since your last visit? Include any pap smears or colon screening.  No     BMI - 43

## 2022-06-24 LAB
ALBUMIN SERPL-MCNC: 4.5 G/DL (ref 3.8–4.8)
ALBUMIN/GLOB SERPL: 1.8 {RATIO} (ref 1.2–2.2)
ALP SERPL-CCNC: 91 IU/L (ref 44–121)
ALT SERPL-CCNC: 16 IU/L (ref 0–32)
AST SERPL-CCNC: 18 IU/L (ref 0–40)
BILIRUB SERPL-MCNC: 0.4 MG/DL (ref 0–1.2)
BUN SERPL-MCNC: 15 MG/DL (ref 8–27)
BUN/CREAT SERPL: 17 (ref 12–28)
CALCIUM SERPL-MCNC: 9.6 MG/DL (ref 8.7–10.3)
CHLORIDE SERPL-SCNC: 100 MMOL/L (ref 96–106)
CO2 SERPL-SCNC: 26 MMOL/L (ref 20–29)
CREAT SERPL-MCNC: 0.89 MG/DL (ref 0.57–1)
EGFR: 71 ML/MIN/1.73
GLOBULIN SER CALC-MCNC: 2.5 G/DL (ref 1.5–4.5)
GLUCOSE SERPL-MCNC: 84 MG/DL (ref 65–99)
LIPASE SERPL-CCNC: 14 U/L (ref 14–72)
POTASSIUM SERPL-SCNC: 4.1 MMOL/L (ref 3.5–5.2)
PROT SERPL-MCNC: 7 G/DL (ref 6–8.5)
SODIUM SERPL-SCNC: 142 MMOL/L (ref 134–144)

## 2022-06-28 DIAGNOSIS — M17.10 PRIMARY OSTEOARTHRITIS OF KNEE, UNSPECIFIED LATERALITY: ICD-10-CM

## 2022-06-28 RX ORDER — MELOXICAM 15 MG/1
TABLET ORAL
Qty: 30 TABLET | Refills: 0 | Status: SHIPPED | OUTPATIENT
Start: 2022-06-28 | End: 2022-07-27

## 2022-06-29 ENCOUNTER — OFFICE VISIT (OUTPATIENT)
Dept: SURGERY | Age: 67
End: 2022-06-29

## 2022-06-29 DIAGNOSIS — E66.01 OBESITY, MORBID, BMI 40.0-49.9 (HCC): Primary | ICD-10-CM

## 2022-06-29 NOTE — PROGRESS NOTES
Nurse note from patient's weekly  LCD  class was reviewed. Pertinent medicaldoing well concerns were:        BP Readings from Last 3 Encounters:   06/22/22 (!) 165/92   06/06/22 (!) 168/91   05/23/22 (!) 142/90       Weight Metrics 6/22/2022 6/22/2022 6/6/2022 5/23/2022 5/10/2022 5/10/2022 4/14/2022   Weight - 227 lb 1.6 oz 234 lb 1.6 oz 234 lb 3.2 oz - 237 lb 14.4 oz 236 lb   Neck Circ (inches) 15.5 - - - 15.25 - -   Waist Measure Inches 44.75 - - - 46.75 - -   Body Fat % 47.9 - - - 48.9 - -   BMI - 42.91 kg/m2 44.23 kg/m2 44.25 kg/m2 - 44.95 kg/m2 44.59 kg/m2       Current Outpatient Medications   Medication Sig Dispense Refill    magnesium 250 mg tab Take 1 Tablet by mouth nightly.  irbesartan (AVAPRO) 300 mg tablet TAKE 1 TABLET BY MOUTH EVERY DAY AT NIGHT 90 Tablet 1    acetaminophen (Tylenol Arthritis Pain) 650 mg TbER Take 1,300 mg by mouth daily.  hydroCHLOROthiazide (HYDRODIURIL) 12.5 mg tablet TAKE 1 TABLET BY MOUTH EVERY DAY 90 Tablet 2    furosemide (LASIX) 20 mg tablet TAKE 1 TABLET BY MOUTH DAILY AS NEEDED 30 Tablet 1    YUVAFEM 10 mcg tab vaginal tablet Insert 10 mcg into vagina two (2) times a week.  Comp. Stocking,Knee,Regular,Lrg misc 20-30mmHG 1 Units 2    multivitamin (ONE A DAY) tablet Take 1 tablet by mouth daily.       meloxicam (MOBIC) 15 mg tablet TAKE 1 TABLET BY MOUTH EVERY DAY AS NEEDED FOR PAIN 30 Tablet 0

## 2022-06-30 NOTE — PROGRESS NOTES
[facility] Center  Metabolic Weight Loss Program        Patient's Name: Edgar Obrien  : 1955    This patient is enrolled in [address] [place] [address] Weight Loss Program and attended the required weekly virtual nutrition class hosted via [employer].       Courtney Pearl MS, RD, LDN

## 2022-07-08 ENCOUNTER — CLINICAL SUPPORT (OUTPATIENT)
Dept: SURGERY | Age: 67
End: 2022-07-08

## 2022-07-08 VITALS
RESPIRATION RATE: 20 BRPM | OXYGEN SATURATION: 96 % | SYSTOLIC BLOOD PRESSURE: 133 MMHG | WEIGHT: 225.9 LBS | DIASTOLIC BLOOD PRESSURE: 80 MMHG | TEMPERATURE: 98.5 F | HEIGHT: 61 IN | BODY MASS INDEX: 42.65 KG/M2 | HEART RATE: 77 BPM

## 2022-07-08 DIAGNOSIS — I10 ESSENTIAL HYPERTENSION: ICD-10-CM

## 2022-07-08 DIAGNOSIS — E66.01 OBESITY, MORBID, BMI 40.0-49.9 (HCC): Primary | ICD-10-CM

## 2022-07-08 DIAGNOSIS — G47.33 SLEEP APNEA, OBSTRUCTIVE: ICD-10-CM

## 2022-07-08 RX ORDER — NAPROXEN 500 MG/1
500 TABLET ORAL 2 TIMES DAILY WITH MEALS
COMMUNITY
Start: 2022-07-01 | End: 2022-07-27 | Stop reason: ALTCHOICE

## 2022-07-13 ENCOUNTER — OFFICE VISIT (OUTPATIENT)
Dept: SURGERY | Age: 67
End: 2022-07-13

## 2022-07-13 DIAGNOSIS — E66.01 OBESITY, MORBID, BMI 40.0-49.9 (HCC): Primary | ICD-10-CM

## 2022-07-14 NOTE — PROGRESS NOTES
[facility] Weight Management Center  Metabolic Weight Loss Program        Patient's Name: Jeniffer Archibald  : 1955    This patient is enrolled in [address] [place] [address] Weight Loss Program and attended the required weekly virtual nutrition class hosted via [employer].       Yanira Little, MS, RD, LDN

## 2022-07-19 ENCOUNTER — OFFICE VISIT (OUTPATIENT)
Dept: SURGERY | Age: 67
End: 2022-07-19
Payer: MEDICARE

## 2022-07-19 VITALS
OXYGEN SATURATION: 98 % | RESPIRATION RATE: 20 BRPM | SYSTOLIC BLOOD PRESSURE: 153 MMHG | HEART RATE: 92 BPM | BODY MASS INDEX: 42.91 KG/M2 | HEIGHT: 61 IN | DIASTOLIC BLOOD PRESSURE: 82 MMHG | WEIGHT: 227.3 LBS

## 2022-07-19 DIAGNOSIS — E66.01 OBESITY, MORBID, BMI 40.0-49.9 (HCC): Primary | ICD-10-CM

## 2022-07-19 DIAGNOSIS — E78.00 HYPERCHOLESTEROLEMIA: ICD-10-CM

## 2022-07-19 DIAGNOSIS — I10 ESSENTIAL HYPERTENSION: ICD-10-CM

## 2022-07-19 DIAGNOSIS — G47.33 SLEEP APNEA, OBSTRUCTIVE: ICD-10-CM

## 2022-07-19 PROCEDURE — 1090F PRES/ABSN URINE INCON ASSESS: CPT | Performed by: FAMILY MEDICINE

## 2022-07-19 PROCEDURE — G8754 DIAS BP LESS 90: HCPCS | Performed by: FAMILY MEDICINE

## 2022-07-19 PROCEDURE — G8399 PT W/DXA RESULTS DOCUMENT: HCPCS | Performed by: FAMILY MEDICINE

## 2022-07-19 PROCEDURE — G8753 SYS BP > OR = 140: HCPCS | Performed by: FAMILY MEDICINE

## 2022-07-19 PROCEDURE — 1123F ACP DISCUSS/DSCN MKR DOCD: CPT | Performed by: FAMILY MEDICINE

## 2022-07-19 PROCEDURE — G8427 DOCREV CUR MEDS BY ELIG CLIN: HCPCS | Performed by: FAMILY MEDICINE

## 2022-07-19 PROCEDURE — 3017F COLORECTAL CA SCREEN DOC REV: CPT | Performed by: FAMILY MEDICINE

## 2022-07-19 PROCEDURE — G9899 SCRN MAM PERF RSLTS DOC: HCPCS | Performed by: FAMILY MEDICINE

## 2022-07-19 PROCEDURE — G8417 CALC BMI ABV UP PARAM F/U: HCPCS | Performed by: FAMILY MEDICINE

## 2022-07-19 PROCEDURE — 99213 OFFICE O/P EST LOW 20 MIN: CPT | Performed by: FAMILY MEDICINE

## 2022-07-19 PROCEDURE — G8510 SCR DEP NEG, NO PLAN REQD: HCPCS | Performed by: FAMILY MEDICINE

## 2022-07-19 PROCEDURE — G8536 NO DOC ELDER MAL SCRN: HCPCS | Performed by: FAMILY MEDICINE

## 2022-07-19 PROCEDURE — 1101F PT FALLS ASSESS-DOCD LE1/YR: CPT | Performed by: FAMILY MEDICINE

## 2022-07-19 NOTE — PROGRESS NOTES
[facility] Progress Note:   F/up Physician Visit    CC: Weight Management      Sheryle Helm is a 75 y.o. female who is here for her  f/up physician visit for the LCD Program.    Weight Metrics 7/19/2022 7/19/2022 7/8/2022 6/22/2022 6/22/2022 6/6/2022 5/23/2022   Weight - 227 lb 4.8 oz 225 lb 14.4 oz - 227 lb 1.6 oz 234 lb 1.6 oz 234 lb 3.2 oz   Neck Circ (inches) 15 - - 15.5 - - -   Waist Measure Inches 45 - - 44.75 - - -   Body Fat % 48.2 - - 47.9 - - -   BMI - 42.95 kg/m2 42.68 kg/m2 - 42.91 kg/m2 44.23 kg/m2 44.25 kg/m2         Outpatient Medications Marked as Taking for the 7/19/22 encounter (Office Visit) with Debi Ramos MD   Medication Sig Dispense Refill    naproxen (NAPROSYN) 500 mg tablet Take 500 mg by mouth two (2) times daily (with meals). magnesium 250 mg tab Take 1 Tablet by mouth nightly. irbesartan (AVAPRO) 300 mg tablet TAKE 1 TABLET BY MOUTH EVERY DAY AT NIGHT 90 Tablet 1    acetaminophen (Tylenol Arthritis Pain) 650 mg TbER Take 1,300 mg by mouth daily. hydroCHLOROthiazide (HYDRODIURIL) 12.5 mg tablet TAKE 1 TABLET BY MOUTH EVERY DAY 90 Tablet 2    furosemide (LASIX) 20 mg tablet TAKE 1 TABLET BY MOUTH DAILY AS NEEDED 30 Tablet 1    YUVAFEM 10 mcg tab vaginal tablet Insert 10 mcg into vagina two (2) times a week. Comp. Stocking,Knee,Regular,Lrg misc 20-30mmHG 1 Units 2    multivitamin (ONE A DAY) tablet Take 1 Tablet by mouth daily. Participation   Did you attend clinic and class last week? yes    Review of Systems  Since your last visit, have you experienced any complications? no  If yes, please list:       Are you taking an appetite suppressant? no  If so, is there any Chest Pain, Palpitations or Dizziness? HUNGER CONTROL: good    BP Readings from Last 3 Encounters:   07/19/22 (!) 153/82   07/08/22 133/80   06/22/22 (!) 165/92       SLEEP: 6.5    Have you received any other medical care this week? no  If yes, where and for what? Have you discontinued or changed any medicine or dose of your medicine since your last visit with Dr Beryle Dear? no  If yes, where and for what? Diet  How many ounces of calorie-free liquids did you consume each day?  40oz    How many meal replacements did you take each day? 1-2 and a meal    Did you have any problems adhering to the program?  no If yes, please explain:      Exercise  Aerobic exercise: nothing consistent min  Resistance exercise: 0 workouts / week  Any discomfort?  no     If yes, where? Objective  Visit Vitals  BP (!) 153/82   Pulse 92   Resp 20   Ht 5' 1\" (1.549 m)   Wt 227 lb 4.8 oz (103.1 kg)   SpO2 98%   BMI 42.95 kg/m²     No LMP recorded. Patient has had a hysterectomy. Physical Exam  Appearance: well,   Mental:A&O x 3, NAD  H:NC/AT,  EENT:   EOMI, PERRL, No scleral icterus  Neck: no bruit or JVD  Lung: clear, No W/R  ABD: soft, active, nontender  Ext:  n Edema  Neuro: nonfocal  Assessment / Plan    Encounter Diagnoses   Name Primary? Obesity, morbid, BMI 40.0-49.9 ([facility] Yes    Essential hypertension     Sleep apnea, obstructive     Hypercholesterolemia      Diagnoses and all orders for this visit:    1. Obesity, morbid, BMI 40.0-49.9 ([facility])  Early morning walk in the pool while on cruise  Recheck in 1 month  No new meds today  2. Essential hypertension  No change in bp meds dose  Bp a littl high but as the work continues here it will come down. It was much higher here 2 months ago    Cont hctz 12.5  3. Sleep apnea, obstructive  Using cpap  4. Hypercholesterolemia  The changes associated here will help lower the cholesreol in most cases    1. Weight management reasonably well controlled   Progress was reviewed with patient    2.   Labs    Latest results reviewed with patient       face to face time with Paula consisted of counseling & coordinating and/or discussing treatment plans in reference to her obesity The primary encounter diagnosis was Obesity, morbid, BMI 40.0-49.9 ([facility] Diagnoses of Essential hypertension, Sleep apnea, obstructive, and Hypercholesterolemia were also pertinent to this visit.

## 2022-07-19 NOTE — PROGRESS NOTES
1. Have you been to the ER, urgent care clinic since your last visit? Hospitalized since your last visit? No    2. Have you seen or consulted any other health care providers outside of the [address] [place] [address] since your last visit? Include any pap smears or colon screening. Yes Where: [facility], for [facility] issues     Pt's blood pressure is high. Pt states she takes her blood pressure medication at night. Pt denies headache, dizziness, blurry vision, chest pain, arm pain, weakness or swelling legs.  Reported pt's blood pressure to Dr. Francisco Galarza

## 2022-07-27 DIAGNOSIS — M17.10 PRIMARY OSTEOARTHRITIS OF KNEE, UNSPECIFIED LATERALITY: ICD-10-CM

## 2022-07-27 RX ORDER — MELOXICAM 15 MG/1
TABLET ORAL
Qty: 30 TABLET | Refills: 0 | Status: SHIPPED | OUTPATIENT
Start: 2022-07-27

## 2022-08-03 ENCOUNTER — OFFICE VISIT (OUTPATIENT)
Dept: SURGERY | Age: 67
End: 2022-08-03

## 2022-08-03 DIAGNOSIS — E66.01 OBESITY, MORBID, BMI 40.0-49.9 (HCC): Primary | ICD-10-CM

## 2022-08-10 ENCOUNTER — OFFICE VISIT (OUTPATIENT)
Dept: SURGERY | Age: 67
End: 2022-08-10

## 2022-08-10 DIAGNOSIS — E66.01 OBESITY, MORBID, BMI 40.0-49.9 (HCC): Primary | ICD-10-CM

## 2022-08-11 NOTE — PROGRESS NOTES
[facility] Weight Management Center  Metabolic Weight Loss Program        Patient's Name: Mickey Rodgers  : 1955    This patient is enrolled in [address] [place] [place] [address] Weight Loss Program and attended the required weekly virtual nutrition class hosted via [address] [place] [address] today.       Lee Persaud, MS, RD, LDN

## 2022-08-17 ENCOUNTER — OFFICE VISIT (OUTPATIENT)
Dept: SURGERY | Age: 67
End: 2022-08-17

## 2022-08-17 DIAGNOSIS — E66.01 OBESITY, MORBID, BMI 40.0-49.9 (HCC): Primary | ICD-10-CM

## 2022-08-17 DIAGNOSIS — I10 ESSENTIAL HYPERTENSION: ICD-10-CM

## 2022-08-17 RX ORDER — FUROSEMIDE 20 MG/1
20 TABLET ORAL DAILY
Qty: 90 TABLET | Refills: 0 | Status: SHIPPED | OUTPATIENT
Start: 2022-08-17 | End: 2022-08-19 | Stop reason: SDUPTHER

## 2022-08-17 NOTE — TELEPHONE ENCOUNTER
[facility] is requesting a 90 day supply per patient insurance. Requested Prescriptions     Pending Prescriptions Disp Refills    furosemide (LASIX) 20 mg tablet 90 Tablet 0     Sig: Take 1 Tablet by mouth daily.

## 2022-08-18 ENCOUNTER — TELEPHONE (OUTPATIENT)
Dept: INTERNAL MEDICINE CLINIC | Age: 67
End: 2022-08-18

## 2022-08-18 NOTE — PROGRESS NOTES
[facility]  Metabolic Weight Loss Program        Patient's Name: Delvis Azar  : 1955    This patient is enrolled in [address] [place] [address] Weight Loss Program and attended the required weekly virtual nutrition class hosted via [employer] today.       Milena Umana RD

## 2022-08-18 NOTE — TELEPHONE ENCOUNTER
----- Message from Sadi Herminio sent at 8/18/2022 10:53 AM EDT -----  Subject: Message to Provider    QUESTIONS  Information for Provider? will be attending appt - [employer] Call.  ---------------------------------------------------------------------------  --------------  [id number]  [phone]; OK to leave message on voicemail  ---------------------------------------------------------------------------  --------------  SCRIPT ANSWERS  undefined

## 2022-08-19 DIAGNOSIS — I10 ESSENTIAL HYPERTENSION: ICD-10-CM

## 2022-08-19 RX ORDER — FUROSEMIDE 20 MG/1
20 TABLET ORAL DAILY
Qty: 90 TABLET | Refills: 0 | Status: SHIPPED | OUTPATIENT
Start: 2022-08-19

## 2022-08-19 NOTE — TELEPHONE ENCOUNTER
Requested Prescriptions     Pending Prescriptions Disp Refills    furosemide (LASIX) 20 mg tablet 90 Tablet 0     Sig: Take 1 Tablet by mouth daily.

## 2022-08-23 ENCOUNTER — OFFICE VISIT (OUTPATIENT)
Dept: SURGERY | Age: 67
End: 2022-08-23
Payer: MEDICARE

## 2022-08-23 VITALS
HEART RATE: 72 BPM | SYSTOLIC BLOOD PRESSURE: 180 MMHG | TEMPERATURE: 98.1 F | BODY MASS INDEX: 44.92 KG/M2 | DIASTOLIC BLOOD PRESSURE: 89 MMHG | HEIGHT: 60 IN | RESPIRATION RATE: 20 BRPM | WEIGHT: 228.8 LBS | OXYGEN SATURATION: 97 %

## 2022-08-23 DIAGNOSIS — G47.33 SLEEP APNEA, OBSTRUCTIVE: ICD-10-CM

## 2022-08-23 DIAGNOSIS — E66.01 OBESITY, MORBID, BMI 40.0-49.9 (HCC): Primary | ICD-10-CM

## 2022-08-23 DIAGNOSIS — I10 ESSENTIAL HYPERTENSION: ICD-10-CM

## 2022-08-23 PROCEDURE — G8417 CALC BMI ABV UP PARAM F/U: HCPCS | Performed by: FAMILY MEDICINE

## 2022-08-23 PROCEDURE — G8427 DOCREV CUR MEDS BY ELIG CLIN: HCPCS | Performed by: FAMILY MEDICINE

## 2022-08-23 PROCEDURE — G8510 SCR DEP NEG, NO PLAN REQD: HCPCS | Performed by: FAMILY MEDICINE

## 2022-08-23 PROCEDURE — 99213 OFFICE O/P EST LOW 20 MIN: CPT | Performed by: FAMILY MEDICINE

## 2022-08-23 PROCEDURE — 1123F ACP DISCUSS/DSCN MKR DOCD: CPT | Performed by: FAMILY MEDICINE

## 2022-08-23 PROCEDURE — 3017F COLORECTAL CA SCREEN DOC REV: CPT | Performed by: FAMILY MEDICINE

## 2022-08-23 PROCEDURE — G8399 PT W/DXA RESULTS DOCUMENT: HCPCS | Performed by: FAMILY MEDICINE

## 2022-08-23 PROCEDURE — G8536 NO DOC ELDER MAL SCRN: HCPCS | Performed by: FAMILY MEDICINE

## 2022-08-23 PROCEDURE — 1101F PT FALLS ASSESS-DOCD LE1/YR: CPT | Performed by: FAMILY MEDICINE

## 2022-08-23 PROCEDURE — 1090F PRES/ABSN URINE INCON ASSESS: CPT | Performed by: FAMILY MEDICINE

## 2022-08-23 PROCEDURE — G8754 DIAS BP LESS 90: HCPCS | Performed by: FAMILY MEDICINE

## 2022-08-23 PROCEDURE — G8753 SYS BP > OR = 140: HCPCS | Performed by: FAMILY MEDICINE

## 2022-08-23 PROCEDURE — G9899 SCRN MAM PERF RSLTS DOC: HCPCS | Performed by: FAMILY MEDICINE

## 2022-08-23 RX ORDER — LANOLIN ALCOHOL/MO/W.PET/CERES
1000 CREAM (GRAM) TOPICAL DAILY
COMMUNITY
End: 2022-10-07

## 2022-08-23 NOTE — PROGRESS NOTES
1. Have you been to the ER, urgent care clinic since your last visit? Hospitalized since your last visit? No    2. Have you seen or consulted any other health care providers outside of the [address] [place] [address] since your last visit? Include any pap smears or colon screening. No    Pt blood pressure is high. Pt states taking blood pressure  medication at night. Pt is complaining of intermittent weakness and swelling in legs denied chest pain, arm pain blurry vision. Notified Dr. Heladio Carrizales of pt blood pressure        Progress Note: Weekly Education Class in the [facility] Loss Program         Patient is on Very Low Calorie Diet [] (4 meal replacements per day, 800 kcal/day)      Low Calorie Diet [x] (2-3 meal replacements per day, 3681-7917 kcal/day)    1) Did patient have any new symptoms or physical problems? Yes []    No [x]    If yes, check & comment: weakness [], fatigue [], lightheadedness [], headache [], cramps [], cold intolerance [], hair loss [], diarrhea [], constipation [],  NA [] other:                                 2) Has patient had any medical attention from other providers, urgent care or the emergency room this week? Yes []  No [x]       NA [], If yes, why:                                       3) Any other sugar sweetened beverages consumed this week? Yes [x]  No []    4) Did patient have any problems adhering to the diet? Yes [x]  No [] NA []    If yes, Vacation [], Celebrations [], Conferences [], Family Reunions [] other:   Vacation                                             5) How many hours of sleep this week? 8    (range)  NA []    Number of meal replacements consumed daily? 0 (range)  NA []    Average ounces of water patient consumed daily this week (not including shakes)?  64     (divide the weekly total by 7)    Did you eat any food outside of the program? Yes [x] No []    Physical Activity Over the Past Week:    Cardio exercise: 0 min  Strength exercise: 0 workouts / week  Number of steps walked per day: 0    How has patient mood overall been this week? Sad [], Happy [x], Stressed [], Tired [], Content [], NA [], other            Medications reconciled by nurse Yes [x]  No[]    Patient was given therapeutic recommendations for any noted side effects of their dietary approach based upon [facility] patient manual per providers recommendation. Progress Note: Weekly Education Class in the [facility] Loss Program         Patient is on Very Low Calorie Diet [] (4 meal replacements per day, 800 kcal/day)      Low Calorie Diet [x] (2-3 meal replacements per day, 2940-8077 kcal/day)    1) Did patient have any new symptoms or physical problems? Yes [x]    No []    If yes, check & comment: weakness [], fatigue [], lightheadedness [], headache [], cramps [], cold intolerance [], hair loss [], diarrhea [], constipation [],  NA [] other:   COVID                              2) Has patient had any medical attention from other providers, urgent care or the emergency room this week? Yes []  No [x]       NA [], If yes, why:                                       3) Any other sugar sweetened beverages consumed this week? Yes []  No [x]    4) Did patient have any problems adhering to the diet? Yes      If yes, Vacation [], Celebrations [], Conferences [], Family Reunions [] other:            COVID                                    5) How many hours of sleep this week? 7-7.5    (range)  NA []    Number of meal replacements consumed daily? 0 (range)  NA []    Average ounces of water patient consumed daily this week (not including shakes)? 64     (divide the weekly total by 7)    Did you eat any food outside of the program? Yes [x] No []    Physical Activity Over the Past Week:    Cardio exercise: 0 min  Strength exercise: 0 workouts / week  Number of steps walked per day: 0    How has patient mood overall been this week?  Sad [], Happy [], Stressed [], Tired [x], Content [], NA [], other            Medications reconciled by nurse Yes [x]  No[]    Patient was given therapeutic recommendations for any noted side effects of their dietary approach based upon [facility] patient manual per providers recommendation.

## 2022-08-23 NOTE — PROGRESS NOTES
[facility] Progress Note:   F/up Physician Visit    CC: Weight Management      Gris Holley is a 75 y.o. female who is here for her  f/up physician visit for the  [facility] Program.  Had covid aug 1  She is still sob  She was not on the meal replacements for 2 weeks  Prior to the covid she went on vacation for 2 weeks and was off of the products then also    Weight Metrics 8/23/2022 8/23/2022 7/19/2022 7/19/2022 7/8/2022 6/22/2022 6/22/2022   Weight - 228 lb 12.8 oz - 227 lb 4.8 oz 225 lb 14.4 oz - 227 lb 1.6 oz   Neck Circ (inches) 14.75 - 15 - - 15.5 -   Waist Measure Inches 45 - 45 - - 44.75 -   Body Fat % 48.8 - 48.2 - - 47.9 -   BMI - 44.68 kg/m2 - 42.95 kg/m2 42.68 kg/m2 - 42.91 kg/m2         Outpatient Medications Marked as Taking for the 8/23/22 encounter (Office Visit) with Marianne Carreon MD   Medication Sig Dispense Refill    cyanocobalamin (Vitamin B-12) 1,000 mcg tablet Take 1,000 mcg by mouth daily. furosemide (LASIX) 20 mg tablet Take 1 Tablet by mouth daily. 90 Tablet 0    meloxicam (MOBIC) 15 mg tablet TAKE 1 TABLET BY MOUTH EVERY DAY AS NEEDED FOR PAIN 30 Tablet 0    magnesium 250 mg tab Take 1 Tablet by mouth nightly. irbesartan (AVAPRO) 300 mg tablet TAKE 1 TABLET BY MOUTH EVERY DAY AT NIGHT 90 Tablet 1    acetaminophen (TYLENOL) 650 mg TbER Take 1,300 mg by mouth daily. hydroCHLOROthiazide (HYDRODIURIL) 12.5 mg tablet TAKE 1 TABLET BY MOUTH EVERY DAY 90 Tablet 2    YUVAFEM 10 mcg tab vaginal tablet Insert 10 mcg into vagina two (2) times a week. Participation   Did you attend clinic and class last week? yes    Review of Systems  Since your last visit, have you experienced any complications? If yes, please list:     Are you taking an appetite suppressant? no  If so, is there any Chest Pain, Palpitations or Dizziness?       HUNGER CONTROL: good    BP Readings from Last 3 Encounters:   08/23/22 (!) 180/89   07/19/22 (!) 153/82   07/08/22 133/80     Taking her bp meds    SLEEP:6-7, using cpap    Have you received any other medical care this week? yes  If yes, where and for what? Have you discontinued or changed any medicine or dose of your medicine since your last visit with Dr Elayne Richard? no  If yes, where and for what? Diet  How many ounces of calorie-free liquids did you consume each day?  3-4 sixteen oz bottles oz    How many meal replacements did you take each day? 0    Did you have any problems adhering to the program?  yes If yes, please explain:  While sick     Exercise  Aerobic exercise: 0 min  Resistance exercise: 0 workouts / week  Any discomfort?  no     If yes, where? Objective  Visit Vitals  BP (!) 180/89 (BP 1 Location: Right upper arm, BP Patient Position: Sitting, BP Cuff Size: Large adult)   Pulse 72   Temp 98.1 °F (36.7 °C)   Resp 20   Ht 5' (1.524 m)   Wt 228 lb 12.8 oz (103.8 kg)   SpO2 97%   BMI 44.68 kg/m²     No LMP recorded. Patient has had a hysterectomy. Physical Exam  Appearance: well,   Mental:A&O x 3, NAD  H:NC/AT,  EENT:   EOMI, PERRL, No scleral icterus  Neck: no bruit or JVD  Lung: clear, No W/R  ABD: soft, active, nontender  Ext:  2+ Edema bilaterally  Neuro: nonfocal  Assessment / Plan    Encounter Diagnoses   Name Primary? Obesity, morbid, BMI 40.0-49.9 ([facility] [address] Yes    Essential hypertension     Sleep apnea, obstructive      Diagnoses and all orders for this visit:    1. Obesity, morbid, BMI 40.0-49.9 ([facility] [facility]  On no appetite meds   Cont the LCD  Move a little more each day  2. Essential hypertension  Cont losartan 300 mg  Hctz 12.5  3. Sleep apnea, obstructive    1. Weight management poorly controlled   Progress was reviewed with patient    2. Labs    Latest results reviewed with patient       face to face time with Paula consisted of counseling & coordinating and/or discussing treatment plans in reference to her obesity  The primary encounter diagnosis was Obesity, morbid, BMI 40.0-49.9 ([facility] [address]  Diagnoses of Essential hypertension and Sleep apnea, obstructive were also pertinent to this visit.

## 2022-08-24 ENCOUNTER — OFFICE VISIT (OUTPATIENT)
Dept: SURGERY | Age: 67
End: 2022-08-24

## 2022-08-24 DIAGNOSIS — E66.01 OBESITY, MORBID, BMI 40.0-49.9 (HCC): Primary | ICD-10-CM

## 2022-08-25 ENCOUNTER — OFFICE VISIT (OUTPATIENT)
Dept: INTERNAL MEDICINE CLINIC | Age: 67
End: 2022-08-25
Payer: MEDICARE

## 2022-08-25 ENCOUNTER — HOSPITAL ENCOUNTER (OUTPATIENT)
Dept: GENERAL RADIOLOGY | Age: 67
Discharge: HOME | End: 2022-08-25
Payer: MEDICARE

## 2022-08-25 VITALS
BODY MASS INDEX: 44.37 KG/M2 | WEIGHT: 226 LBS | SYSTOLIC BLOOD PRESSURE: 144 MMHG | RESPIRATION RATE: 18 BRPM | HEART RATE: 58 BPM | DIASTOLIC BLOOD PRESSURE: 87 MMHG | OXYGEN SATURATION: 98 % | TEMPERATURE: 96.8 F | HEIGHT: 60 IN

## 2022-08-25 DIAGNOSIS — R55 SYNCOPE, UNSPECIFIED SYNCOPE TYPE: ICD-10-CM

## 2022-08-25 DIAGNOSIS — I50.22 CHRONIC SYSTOLIC CONGESTIVE HEART FAILURE (HCC): ICD-10-CM

## 2022-08-25 DIAGNOSIS — R06.2 WHEEZING: Primary | ICD-10-CM

## 2022-08-25 DIAGNOSIS — M17.10 PRIMARY OSTEOARTHRITIS OF KNEE, UNSPECIFIED LATERALITY: ICD-10-CM

## 2022-08-25 DIAGNOSIS — I10 ESSENTIAL HYPERTENSION: ICD-10-CM

## 2022-08-25 DIAGNOSIS — R06.2 WHEEZING: ICD-10-CM

## 2022-08-25 DIAGNOSIS — E66.01 MORBID OBESITY WITH BMI OF 40.0-44.9, ADULT (HCC): ICD-10-CM

## 2022-08-25 DIAGNOSIS — N18.31 STAGE 3A CHRONIC KIDNEY DISEASE (HCC): ICD-10-CM

## 2022-08-25 LAB
ALBUMIN SERPL-MCNC: 3.7 G/DL (ref 3.5–5)
ALBUMIN/GLOB SERPL: 1.1 {RATIO} (ref 1.1–2.2)
ALP SERPL-CCNC: 87 U/L (ref 45–117)
ALT SERPL-CCNC: 20 U/L (ref 12–78)
ANION GAP SERPL CALC-SCNC: 6 MMOL/L (ref 5–15)
AST SERPL-CCNC: 20 U/L (ref 15–37)
BASOPHILS # BLD: 0 K/UL (ref 0–0.1)
BASOPHILS NFR BLD: 0 % (ref 0–1)
BILIRUB SERPL-MCNC: 0.4 MG/DL (ref 0.2–1)
BNP SERPL-MCNC: 130 PG/ML
BUN SERPL-MCNC: 20 MG/DL (ref 6–20)
BUN/CREAT SERPL: 21 (ref 12–20)
CALCIUM SERPL-MCNC: 9.7 MG/DL (ref 8.5–10.1)
CHLORIDE SERPL-SCNC: 101 MMOL/L (ref 97–108)
CO2 SERPL-SCNC: 33 MMOL/L (ref 21–32)
CREAT SERPL-MCNC: 0.96 MG/DL (ref 0.55–1.02)
DIFFERENTIAL METHOD BLD: NORMAL
EOSINOPHIL # BLD: 0.1 K/UL (ref 0–0.4)
EOSINOPHIL NFR BLD: 1 % (ref 0–7)
ERYTHROCYTE [DISTWIDTH] IN BLOOD BY AUTOMATED COUNT: 13.2 % (ref 11.5–14.5)
GLOBULIN SER CALC-MCNC: 3.4 G/DL (ref 2–4)
GLUCOSE SERPL-MCNC: 75 MG/DL (ref 65–100)
HCT VFR BLD AUTO: 42.4 % (ref 35–47)
HGB BLD-MCNC: 13.4 G/DL (ref 11.5–16)
IMM GRANULOCYTES # BLD AUTO: 0 K/UL (ref 0–0.04)
IMM GRANULOCYTES NFR BLD AUTO: 0 % (ref 0–0.5)
LYMPHOCYTES # BLD: 1.7 K/UL (ref 0.8–3.5)
LYMPHOCYTES NFR BLD: 22 % (ref 12–49)
MCH RBC QN AUTO: 28.8 PG (ref 26–34)
MCHC RBC AUTO-ENTMCNC: 31.6 G/DL (ref 30–36.5)
MCV RBC AUTO: 91 FL (ref 80–99)
MONOCYTES # BLD: 0.4 K/UL (ref 0–1)
MONOCYTES NFR BLD: 5 % (ref 5–13)
NEUTS SEG # BLD: 5.4 K/UL (ref 1.8–8)
NEUTS SEG NFR BLD: 72 % (ref 32–75)
NRBC # BLD: 0 K/UL (ref 0–0.01)
NRBC BLD-RTO: 0 PER 100 WBC
PLATELET # BLD AUTO: 364 K/UL (ref 150–400)
PMV BLD AUTO: 9.8 FL (ref 8.9–12.9)
POTASSIUM SERPL-SCNC: 3.9 MMOL/L (ref 3.5–5.1)
PROT SERPL-MCNC: 7.1 G/DL (ref 6.4–8.2)
RBC # BLD AUTO: 4.66 M/UL (ref 3.8–5.2)
SODIUM SERPL-SCNC: 140 MMOL/L (ref 136–145)
WBC # BLD AUTO: 7.6 K/UL (ref 3.6–11)

## 2022-08-25 PROCEDURE — 99214 OFFICE O/P EST MOD 30 MIN: CPT | Performed by: INTERNAL MEDICINE

## 2022-08-25 PROCEDURE — G8399 PT W/DXA RESULTS DOCUMENT: HCPCS | Performed by: INTERNAL MEDICINE

## 2022-08-25 PROCEDURE — 3017F COLORECTAL CA SCREEN DOC REV: CPT | Performed by: INTERNAL MEDICINE

## 2022-08-25 PROCEDURE — 71046 X-RAY EXAM CHEST 2 VIEWS: CPT

## 2022-08-25 PROCEDURE — G8417 CALC BMI ABV UP PARAM F/U: HCPCS | Performed by: INTERNAL MEDICINE

## 2022-08-25 PROCEDURE — G9899 SCRN MAM PERF RSLTS DOC: HCPCS | Performed by: INTERNAL MEDICINE

## 2022-08-25 PROCEDURE — G8427 DOCREV CUR MEDS BY ELIG CLIN: HCPCS | Performed by: INTERNAL MEDICINE

## 2022-08-25 PROCEDURE — 93000 ELECTROCARDIOGRAM COMPLETE: CPT | Performed by: INTERNAL MEDICINE

## 2022-08-25 PROCEDURE — 1101F PT FALLS ASSESS-DOCD LE1/YR: CPT | Performed by: INTERNAL MEDICINE

## 2022-08-25 PROCEDURE — G8753 SYS BP > OR = 140: HCPCS | Performed by: INTERNAL MEDICINE

## 2022-08-25 PROCEDURE — G8754 DIAS BP LESS 90: HCPCS | Performed by: INTERNAL MEDICINE

## 2022-08-25 PROCEDURE — G8432 DEP SCR NOT DOC, RNG: HCPCS | Performed by: INTERNAL MEDICINE

## 2022-08-25 PROCEDURE — G8536 NO DOC ELDER MAL SCRN: HCPCS | Performed by: INTERNAL MEDICINE

## 2022-08-25 PROCEDURE — 1090F PRES/ABSN URINE INCON ASSESS: CPT | Performed by: INTERNAL MEDICINE

## 2022-08-25 NOTE — PROGRESS NOTES
Ms. Luis F Haji is presenting to follow up     CC:  Follow-up  HTN       HPI:    76 yo woman with a hx of morbid obesity, HTN and SUDEEP presenting to follow up   In the interval August 1st after cruise she had COVID 19     Increased avapro due to higher BP increased to 300mg daily and taking HCTZ 12.5mg daily. BP is still elevated today but better . Patient denies CP , headache  However she reports she is having episodes of possible syncope - she is unsure    She reports she was on a zoom call in her chair and she believes s he passed out, no witness no loss of urine. She reports since then having episodes that she is unsure if having episodes of passing out. She was told at weight loss center that she is wheezing  All of this onset after COVID 19 - August 1st     Review of systems:  Constitutional: negative for fever, chills, weight loss, night sweats   10 systems reviewed and negative other than HPI    Past Medical History:   Diagnosis Date    HTN (hypertension) 1/15/2010    Sleep apnea 1/20/2015    Sleep apnea, obstructive 1/5/2016        Past Surgical History:   Procedure Laterality Date    HX APPENDECTOMY      HX HYSTERECTOMY  approx 1987    2/2 uterine fibroids    HX OTHER SURGICAL  2002    hiatal hernia repair        No Known Allergies    Current Outpatient Medications on File Prior to Visit   Medication Sig Dispense Refill    cyanocobalamin 1,000 mcg tablet Take 1,000 mcg by mouth daily. furosemide (LASIX) 20 mg tablet Take 1 Tablet by mouth daily. (Patient taking differently: Take 20 mg by mouth daily. Per patient, she take PRN) 90 Tablet 0    meloxicam (MOBIC) 15 mg tablet TAKE 1 TABLET BY MOUTH EVERY DAY AS NEEDED FOR PAIN 30 Tablet 0    magnesium 250 mg tab Take 1 Tablet by mouth nightly. irbesartan (AVAPRO) 300 mg tablet TAKE 1 TABLET BY MOUTH EVERY DAY AT NIGHT 90 Tablet 1    acetaminophen (TYLENOL) 650 mg TbER Take 1,300 mg by mouth daily.       hydroCHLOROthiazide (HYDRODIURIL) 12.5 mg tablet TAKE 1 TABLET BY MOUTH EVERY DAY 90 Tablet 2    YUVAFEM 10 mcg tab vaginal tablet Insert 10 mcg into vagina two (2) times a week. Comp. Stocking,Knee,Regular,Lrg misc 20-30mmHG 1 Units 2    multivitamin (ONE A DAY) tablet Take 1 Tablet by mouth daily. (Patient not taking: Reported on 8/25/2022)       No current facility-administered medications on file prior to visit. family history includes Cancer in her mother; Hypertension in her brother, father, and sister; Prostate Cancer in her father. Social History     Socioeconomic History    Marital status: SINGLE     Spouse name: Not on file    Number of children: Not on file    Years of education: Not on file    Highest education level: Not on file   Occupational History    Not on file   Tobacco Use    Smoking status: Never    Smokeless tobacco: Never   Vaping Use    Vaping Use: Never used   Substance and Sexual Activity    Alcohol use: Yes     Alcohol/week: 4.2 standard drinks     Types: 5 Glasses of wine per week     Comment: 1 glass per night    Drug use: No    Sexual activity: Yes     Partners: Male     Birth control/protection: Condom   Other Topics Concern    Not on file   Social History Narrative    Not on file     Social Determinants of Health     Financial Resource Strain: Low Risk     Difficulty of Paying Living Expenses: Not very hard   Food Insecurity: No Food Insecurity    Worried About Running Out of Food in the Last Year: Never true    Ran Out of Food in the Last Year: Never true   Transportation Needs: Not on file   Physical Activity: Not on file   Stress: Not on file   Social Connections: Not on file   Intimate Partner Violence: Not on file   Housing Stability: Not on file       Visit Vitals  BP (!) 144/87   Pulse (!) 58   Temp 96.8 °F (36 °C) (Temporal)   Resp 18   Ht 5' (1.524 m)   Wt 226 lb (102.5 kg)   SpO2 98%   BMI 44.14 kg/m²     General:  Well appearing female no acute distress  HEENT:   PERRL,normal conjunctiva.  External ear and canals normal, TMs normal.  Hearing normal to voice. Neck:  Supple. Respiratory: no respiratory distress,  + wheezing , no rhonchi, no rales. No chest wall tenderness. Cardiovascular:  RRR, normal S1S2, no murmur. Gastrointestinal: normal bowel sounds, soft, nontender, without masses. No hepatosplenomegaly. Extremities +2 pulses, no edema, normal sensation   Musculoskeletal:  Normal gait. Normal digits and nails. Normal neck exam normal strength in hands and arms  Skin:  No rash, no lesions, no ulcers. Skin warm, normal turgor, without induration or nodules. Neuro:  A and OX4, fluent speech, cranial nerves normal 2-12.  Psych:  Normal affect      Lab Results   Component Value Date/Time    WBC 7.7 04/14/2022 11:44 AM    HGB 13.2 04/14/2022 11:44 AM    HCT 43.8 04/14/2022 11:44 AM    PLATELET 946 80/07/3937 11:44 AM    MCV 94.2 04/14/2022 11:44 AM     Lab Results   Component Value Date/Time    Sodium 142 06/23/2022 08:47 AM    Potassium 4.1 06/23/2022 08:47 AM    Chloride 100 06/23/2022 08:47 AM    CO2 26 06/23/2022 08:47 AM    Anion gap 5 04/14/2022 11:44 AM    Glucose 84 06/23/2022 08:47 AM    BUN 15 06/23/2022 08:47 AM    Creatinine 0.89 06/23/2022 08:47 AM    BUN/Creatinine ratio 17 06/23/2022 08:47 AM    GFR est AA >60 04/14/2022 11:44 AM    GFR est non-AA >60 04/14/2022 11:44 AM    Calcium 9.6 06/23/2022 08:47 AM     Lab Results   Component Value Date/Time    Cholesterol, total 221 (H) 05/11/2022 09:23 AM    HDL Cholesterol 75 05/11/2022 09:23 AM    LDL, calculated 130 (H) 05/11/2022 09:23 AM    LDL, calculated 110 (H) 09/03/2021 08:50 AM    VLDL, calculated 16 05/11/2022 09:23 AM    VLDL, calculated 14 09/03/2021 08:50 AM    Triglyceride 93 05/11/2022 09:23 AM    CHOL/HDL Ratio 2.4 09/03/2021 08:50 AM     Lab Results   Component Value Date/Time    TSH 1.780 05/11/2022 09:23 AM     Lab Results   Component Value Date/Time    Hemoglobin A1c 5.4 05/11/2022 09:23 AM     Lab Results   Component Value Date/Time    Vitamin D 25-Hydroxy 32.3 04/14/2022 11:44 AM               EKG unchanged from previous     Assessment and Plan:      Wheezing  - NT-PRO BNP; Future  - XR CHEST PA LAT; Future  - AMB POC EKG ROUTINE W/ 12 LEADS, SCREEN ()  - NT-PRO BNP      Syncope, unspecified syncope type - patient unsure if true syncope, given recurrence of episodes will check the following  Also discussed that if occurs again to seek immediate care  - METABOLIC PANEL, COMPREHENSIVE; Future  - CBC WITH AUTOMATED DIFF; Future  - CARDIAC HOLTER MONITOR; Future  - ECHO ADULT COMPLETE; Future  - DUPLEX CAROTID BILATERAL; Future  - NT-PRO BNP; Future  - XR CHEST PA LAT; Future  - AMB POC EKG ROUTINE W/ 12 LEADS, SCREEN ()  - NT-PRO BNP  - METABOLIC PANEL, COMPREHENSIVE  - CBC WITH AUTOMATED DIFF      Essential hypertension  Close to goal 144/87 current regimen of avapro 300 and HCTZ 12.5mg   - patient is hesitant to increase dose of HCTZ or add third agent  -recommend checking blood pressure with goal of less than  130/85 on average. Follow lo sodium diet. Given DASH diet guidelines. Recommend cardiovascular exercise regularly. Work on weight reduction. Call with blood pressure readings. - furosemide (LASIX) 20 mg tablet; Take 1 Tablet by mouth daily as needed (edema). Dispense: 30 Tablet; Refill: 1  - irbesartan (AVAPRO) 300 mg tablet; Take 1 Tablet by mouth nightly. Dispense: 90 Tablet; Refill: 1    Morbid obesity with BMI of 40.0-44.9, adult ([facility] [address]  Counseled on weight loss and healthy diet  Currently at weight loss center     Primary osteoarthritis of knee, unspecified laterality  Takes occasional Mobic for severe pain discussed to avoid this as much as possible as it can increase blood pressure. Advised to take Tylenol for pain     Bursitis of left hip: saw ortho     Obstructive sleep apnea reports compliance with CPAP        No orders of the defined types were placed in this encounter.

## 2022-08-25 NOTE — PROGRESS NOTES
1. \"Have you been to the ER, urgent care clinic since your last visit? Hospitalized since your last visit? \" No    2. \"Have you seen or consulted any other health care providers outside of the [address] [place] [address] since your last visit? \" No     3. For patients aged 39-70: Has the patient had a colonoscopy / FIT/ Cologuard? Yes - no Care Gap present      If the patient is female:    4. For patients aged 41-77: Has the patient had a mammogram within the past 2 years? Yes - no Care Gap present      5. For patients aged 21-65: Has the patient had a pap smear?  NA - based on age or sex

## 2022-08-25 NOTE — PROGRESS NOTES
[facility]  Metabolic Weight Loss Program        Patient's Name: Mary Gonzales  : 1955    This patient is enrolled in [address] [place] [address] Weight Loss Program and attended the required weekly virtual nutrition class hosted via [address] [place] [address] today.       Amna Benjamin, MS, RD, LDN

## 2022-08-25 NOTE — PATIENT INSTRUCTIONS
Doing EKG, ECHO of heat, carotid US and holter and chest x ray and labs   But if another episode of passing out happens please go to ER

## 2022-09-01 ENCOUNTER — OFFICE VISIT (OUTPATIENT)
Dept: SURGERY | Age: 67
End: 2022-09-01

## 2022-09-01 DIAGNOSIS — E66.01 OBESITY, MORBID, BMI 40.0-49.9 (HCC): Primary | ICD-10-CM

## 2022-09-01 NOTE — PROGRESS NOTES
Waiting on the ECHO of the heart.  EKG is unchanged from previous  Labs shows CO2 retention which can happen if not using CPAP consistently   - it is important to use it every night  Normal blood count

## 2022-09-01 NOTE — PROGRESS NOTES
[facility]  Metabolic Weight Loss Program        Patient's Name: Salima Haines  : 1955    This patient is enrolled in [address] [place] [address] Weight Loss Program and attended the required weekly virtual nutrition class hosted via [address] [place] [address] today.       Josefina Lee, MS, RD, LDN

## 2022-09-02 NOTE — PROGRESS NOTES
[facility]  Metabolic Weight Loss Program        Patient's Name: Laury Jean  : 1955    This patient is enrolled in [address] [place] [address] Weight Loss Program and attended the required weekly virtual nutrition class hosted via [employer] today.       Nati Walden, MS, RD, LDN

## 2022-09-07 ENCOUNTER — OFFICE VISIT (OUTPATIENT)
Dept: SURGERY | Age: 67
End: 2022-09-07

## 2022-09-07 ENCOUNTER — CLINICAL SUPPORT (OUTPATIENT)
Dept: SURGERY | Age: 67
End: 2022-09-07

## 2022-09-07 VITALS
RESPIRATION RATE: 18 BRPM | HEART RATE: 77 BPM | SYSTOLIC BLOOD PRESSURE: 144 MMHG | DIASTOLIC BLOOD PRESSURE: 79 MMHG | WEIGHT: 224.5 LBS | OXYGEN SATURATION: 95 % | HEIGHT: 60 IN | BODY MASS INDEX: 44.07 KG/M2 | TEMPERATURE: 98.2 F

## 2022-09-07 DIAGNOSIS — E66.01 OBESITY, MORBID, BMI 40.0-49.9 (HCC): Primary | ICD-10-CM

## 2022-09-07 DIAGNOSIS — I10 ESSENTIAL HYPERTENSION: ICD-10-CM

## 2022-09-07 DIAGNOSIS — E78.00 HYPERCHOLESTEROLEMIA: ICD-10-CM

## 2022-09-07 NOTE — PROGRESS NOTES
Week # 1    Progress Note: Weekly Education Class in the [facility] Weight Loss Program         Patient is on Very Low Calorie Diet [] (4 meal replacements per day, 800 kcal/day)      Low Calorie Diet [x] (2-3 meal replacements per day, 0473-3538 kcal/day)    1) Did patient have any new symptoms or physical problems? Yes []    No [x]    If yes, check & comment: weakness [], fatigue [], lightheadedness [], headache [], cramps [], cold intolerance [], hair loss [], diarrhea [], constipation [],  NA [] other:                                 2) Has patient had any medical attention from other providers, urgent care or the emergency room this week? Yes [x]  No []       NA [], If yes, why:                                       3) Any other sugar sweetened beverages consumed this week? Yes [x]  No []    4) Did patient have any problems adhering to the diet? Yes [x]  No [] NA []    If yes, Vacation [], Celebrations [], Conferences [], Family Reunions [] other:                                                5) How many hours of sleep this week? 6-10    (range)  NA []    Number of meal replacements consumed daily? 1-2  (range)  NA []    Average ounces of water patient consumed daily this week (not including shakes)? 64     (divide the weekly total by 7)    Did you eat any food outside of the program? Yes [] No [x]    Physical Activity Over the Past Week:    Cardio exercise: 30 min  Strength exercise: 1 workouts / week  Number of steps walked per day: 0    How has patient mood overall been this week? Sad [], Happy [], Stressed [], Tired [], Content [x], NA [], other            Medications reconciled by nurse Yes [x]  No[]    Patient was given therapeutic recommendations for any noted side effects of their dietary approach based upon [facility] patient manual per providers recommendation.      Week # 2    Progress Note: Weekly Education Class in the [facility] Weight Loss Program         Patient is on Very Low Calorie Diet [] (4 meal replacements per day, 800 kcal/day)      Low Calorie Diet [x] (2-3 meal replacements per day, 6484-2522 kcal/day)    1) Did patient have any new symptoms or physical problems? Yes []    No [x]    If yes, check & comment: weakness [], fatigue [], lightheadedness [], headache [], cramps [], cold intolerance [], hair loss [], diarrhea [], constipation [],  NA [] other:                                 2) Has patient had any medical attention from other providers, urgent care or the emergency room this week? Yes []  No [x]       NA [], If yes, why:                                       3) Any other sugar sweetened beverages consumed this week? Yes [x]  No []    4) Did patient have any problems adhering to the diet? Yes [x]  No [] NA []    If yes, Vacation [], Celebrations [x], Conferences [], Family Reunions [] other:                                                5) How many hours of sleep this week? 6-8    (range)  NA []    Number of meal replacements consumed daily? 1-2  (range)  NA []    Average ounces of water patient consumed daily this week (not including shakes)? 64     (divide the weekly total by 7)    Did you eat any food outside of the program? Yes [x] No []    Physical Activity Over the Past Week:    Cardio exercise: 30 min  Strength exercise: 1 workouts / week  Number of steps walked per day: 0    How has patient mood overall been this week? Sad [], Happy [], Stressed [], Tired [], Content [x], NA [], other            Medications reconciled by nurse Yes [x]  No[]    Patient was given therapeutic recommendations for any noted side effects of their dietary approach based upon [facility] patient manual per providers recommendation.

## 2022-09-14 ENCOUNTER — OFFICE VISIT (OUTPATIENT)
Dept: SURGERY | Age: 67
End: 2022-09-14

## 2022-09-14 DIAGNOSIS — E66.01 OBESITY, MORBID, BMI 40.0-49.9 (HCC): Primary | ICD-10-CM

## 2022-09-15 ENCOUNTER — HOSPITAL ENCOUNTER (OUTPATIENT)
Dept: VASCULAR SURGERY | Age: 67
Discharge: HOME | End: 2022-09-15
Attending: INTERNAL MEDICINE
Payer: MEDICARE

## 2022-09-15 ENCOUNTER — HOSPITAL ENCOUNTER (OUTPATIENT)
Dept: NON INVASIVE DIAGNOSTICS | Age: 67
Discharge: HOME | End: 2022-09-15
Attending: INTERNAL MEDICINE
Payer: MEDICARE

## 2022-09-15 VITALS
DIASTOLIC BLOOD PRESSURE: 79 MMHG | WEIGHT: 224.43 LBS | HEIGHT: 60 IN | SYSTOLIC BLOOD PRESSURE: 144 MMHG | BODY MASS INDEX: 44.06 KG/M2

## 2022-09-15 DIAGNOSIS — R55 SYNCOPE, UNSPECIFIED SYNCOPE TYPE: ICD-10-CM

## 2022-09-15 LAB
ECHO AV AREA PEAK VELOCITY: 1.8 CM2
ECHO AV AREA VTI: 2 CM2
ECHO AV AREA/BSA PEAK VELOCITY: 0.9 CM2/M2
ECHO AV AREA/BSA VTI: 1 CM2/M2
ECHO AV MEAN GRADIENT: 4 MMHG
ECHO AV MEAN VELOCITY: 0.9 M/S
ECHO AV PEAK GRADIENT: 6 MMHG
ECHO AV PEAK VELOCITY: 1.2 M/S
ECHO AV VELOCITY RATIO: 0.92
ECHO AV VTI: 25.9 CM
ECHO LA DIAMETER INDEX: 1.99 CM/M2
ECHO LA DIAMETER: 3.9 CM
ECHO LA VOL 4C: 45 ML (ref 22–52)
ECHO LA VOLUME INDEX A4C: 23 ML/M2 (ref 16–34)
ECHO LV E' LATERAL VELOCITY: 8 CM/S
ECHO LV E' SEPTAL VELOCITY: 9 CM/S
ECHO LV FRACTIONAL SHORTENING: 20 % (ref 28–44)
ECHO LV INTERNAL DIMENSION DIASTOLE INDEX: 2.09 CM/M2
ECHO LV INTERNAL DIMENSION DIASTOLIC: 4.1 CM (ref 3.9–5.3)
ECHO LV INTERNAL DIMENSION SYSTOLIC INDEX: 1.68 CM/M2
ECHO LV INTERNAL DIMENSION SYSTOLIC: 3.3 CM
ECHO LV IVSD: 1.1 CM (ref 0.6–0.9)
ECHO LV MASS 2D: 151.3 G (ref 67–162)
ECHO LV MASS INDEX 2D: 77.2 G/M2 (ref 43–95)
ECHO LV POSTERIOR WALL DIASTOLIC: 1.1 CM (ref 0.6–0.9)
ECHO LV RELATIVE WALL THICKNESS RATIO: 0.54
ECHO LVOT AREA: 2 CM2
ECHO LVOT AV VTI INDEX: 1.02
ECHO LVOT DIAM: 1.6 CM
ECHO LVOT MEAN GRADIENT: 3 MMHG
ECHO LVOT PEAK GRADIENT: 5 MMHG
ECHO LVOT PEAK VELOCITY: 1.1 M/S
ECHO LVOT STROKE VOLUME INDEX: 27.1 ML/M2
ECHO LVOT SV: 53.1 ML
ECHO LVOT VTI: 26.4 CM
ECHO MV A VELOCITY: 0.96 M/S
ECHO MV AREA PHT: 4.1 CM2
ECHO MV AREA VTI: 1.9 CM2
ECHO MV E DECELERATION TIME (DT): 236.2 MS
ECHO MV E VELOCITY: 1 M/S
ECHO MV E/A RATIO: 1.04
ECHO MV E/E' LATERAL: 12.5
ECHO MV E/E' RATIO (AVERAGED): 11.81
ECHO MV E/E' SEPTAL: 11.11
ECHO MV LVOT VTI INDEX: 1.03
ECHO MV MAX VELOCITY: 1.1 M/S
ECHO MV MEAN GRADIENT: 2 MMHG
ECHO MV MEAN VELOCITY: 0.6 M/S
ECHO MV PEAK GRADIENT: 5 MMHG
ECHO MV PRESSURE HALF TIME (PHT): 53.6 MS
ECHO MV REGURGITANT PEAK GRADIENT: 169 MMHG
ECHO MV REGURGITANT PEAK VELOCITY: 6.5 M/S
ECHO MV VTI: 27.3 CM
ECHO PV MAX VELOCITY: 0.7 M/S
ECHO PV PEAK GRADIENT: 2 MMHG
ECHO RV FREE WALL PEAK S': 13 CM/S
LEFT CCA DIST DIAS: 10 CM/S
LEFT CCA DIST SYS: 64.9 CM/S
LEFT CCA PROX DIAS: 7.9 CM/S
LEFT CCA PROX SYS: 49.9 CM/S
LEFT ECA DIAS: 4.29 CM/S
LEFT ECA SYS: 45 CM/S
LEFT ICA DIST DIAS: 15.4 CM/S
LEFT ICA DIST SYS: 47.2 CM/S
LEFT ICA MID DIAS: 19.7 CM/S
LEFT ICA MID SYS: 68.2 CM/S
LEFT ICA PROX DIAS: 14.9 CM/S
LEFT ICA PROX SYS: 55.2 CM/S
LEFT ICA/CCA SYS: 1.05 NO UNITS
LEFT VERTEBRAL DIAS: 11.32 CM/S
LEFT VERTEBRAL SYS: 41.2 CM/S
RIGHT CCA DIST DIAS: 13.1 CM/S
RIGHT CCA DIST SYS: 62.5 CM/S
RIGHT CCA PROX DIAS: 7.6 CM/S
RIGHT CCA PROX SYS: 62.5 CM/S
RIGHT ECA DIAS: 0 CM/S
RIGHT ECA SYS: 51.9 CM/S
RIGHT ICA DIST DIAS: 21.1 CM/S
RIGHT ICA DIST SYS: 71.7 CM/S
RIGHT ICA MID DIAS: 18.9 CM/S
RIGHT ICA MID SYS: 55.2 CM/S
RIGHT ICA PROX DIAS: 9 CM/S
RIGHT ICA PROX SYS: 35.4 CM/S
RIGHT ICA/CCA SYS: 1.2 NO UNITS
RIGHT VERTEBRAL DIAS: 10.11 CM/S
RIGHT VERTEBRAL SYS: 59.6 CM/S
VAS LEFT SUBCLAVIAN PROX EDV: 0 CM/S
VAS LEFT SUBCLAVIAN PROX PSV: 190 CM/S
VAS RIGHT SUBCLAVIAN PROX EDV: 0 CM/S
VAS RIGHT SUBCLAVIAN PROX PSV: 111.3 CM/S

## 2022-09-15 PROCEDURE — 74011250636 HC RX REV CODE- 250/636: Performed by: INTERNAL MEDICINE

## 2022-09-15 PROCEDURE — C8929 TTE W OR WO FOL WCON,DOPPLER: HCPCS

## 2022-09-15 PROCEDURE — 93225 XTRNL ECG REC<48 HRS REC: CPT

## 2022-09-15 PROCEDURE — 93880 EXTRACRANIAL BILAT STUDY: CPT

## 2022-09-15 RX ADMIN — PERFLUTREN 2 ML: 6.52 INJECTION, SUSPENSION INTRAVENOUS at 10:00

## 2022-09-16 NOTE — PROGRESS NOTES
[facility]  Metabolic Weight Loss Program        Patient's Name: Christiane Lyons  : 1955    This patient is enrolled in [address] [place] [address] Weight Loss Program and attended the required weekly virtual nutrition class hosted via [address] [place] [address] today.       Jim Ramirez, MS, RD, LDN

## 2022-09-18 PROBLEM — N18.30 CHRONIC RENAL DISEASE, STAGE III (HCC): Status: ACTIVE | Noted: 2022-09-18

## 2022-09-18 NOTE — PROGRESS NOTES
Heart ECHO shows mild decrease in heart pump function - early heart failure, Recommend that patient sees cardiologist   Most likely due to hypertension   Referral done please give patient information   Can schedule virtual visit to discuss with patient

## 2022-09-21 ENCOUNTER — OFFICE VISIT (OUTPATIENT)
Dept: SURGERY | Age: 67
End: 2022-09-21

## 2022-09-21 DIAGNOSIS — E66.01 OBESITY, MORBID, BMI 40.0-49.9 (HCC): Primary | ICD-10-CM

## 2022-09-27 ENCOUNTER — OFFICE VISIT (OUTPATIENT)
Dept: SURGERY | Age: 67
End: 2022-09-27
Payer: MEDICARE

## 2022-09-27 VITALS
SYSTOLIC BLOOD PRESSURE: 158 MMHG | RESPIRATION RATE: 18 BRPM | TEMPERATURE: 98.1 F | WEIGHT: 224.3 LBS | OXYGEN SATURATION: 96 % | BODY MASS INDEX: 44.04 KG/M2 | DIASTOLIC BLOOD PRESSURE: 84 MMHG | HEIGHT: 60 IN | HEART RATE: 88 BPM

## 2022-09-27 DIAGNOSIS — G47.33 SLEEP APNEA, OBSTRUCTIVE: ICD-10-CM

## 2022-09-27 DIAGNOSIS — E66.01 OBESITY, MORBID, BMI 40.0-49.9 (HCC): Primary | ICD-10-CM

## 2022-09-27 DIAGNOSIS — I10 ESSENTIAL HYPERTENSION: ICD-10-CM

## 2022-09-27 DIAGNOSIS — E78.00 HYPERCHOLESTEROLEMIA: ICD-10-CM

## 2022-09-27 PROCEDURE — 99214 OFFICE O/P EST MOD 30 MIN: CPT | Performed by: FAMILY MEDICINE

## 2022-09-27 PROCEDURE — 1101F PT FALLS ASSESS-DOCD LE1/YR: CPT | Performed by: FAMILY MEDICINE

## 2022-09-27 PROCEDURE — G8536 NO DOC ELDER MAL SCRN: HCPCS | Performed by: FAMILY MEDICINE

## 2022-09-27 PROCEDURE — G8753 SYS BP > OR = 140: HCPCS | Performed by: FAMILY MEDICINE

## 2022-09-27 PROCEDURE — G8432 DEP SCR NOT DOC, RNG: HCPCS | Performed by: FAMILY MEDICINE

## 2022-09-27 PROCEDURE — G8399 PT W/DXA RESULTS DOCUMENT: HCPCS | Performed by: FAMILY MEDICINE

## 2022-09-27 PROCEDURE — G8427 DOCREV CUR MEDS BY ELIG CLIN: HCPCS | Performed by: FAMILY MEDICINE

## 2022-09-27 PROCEDURE — G8417 CALC BMI ABV UP PARAM F/U: HCPCS | Performed by: FAMILY MEDICINE

## 2022-09-27 PROCEDURE — G8754 DIAS BP LESS 90: HCPCS | Performed by: FAMILY MEDICINE

## 2022-09-27 PROCEDURE — 1090F PRES/ABSN URINE INCON ASSESS: CPT | Performed by: FAMILY MEDICINE

## 2022-09-27 PROCEDURE — G9899 SCRN MAM PERF RSLTS DOC: HCPCS | Performed by: FAMILY MEDICINE

## 2022-09-27 PROCEDURE — 1123F ACP DISCUSS/DSCN MKR DOCD: CPT | Performed by: FAMILY MEDICINE

## 2022-09-27 PROCEDURE — 3017F COLORECTAL CA SCREEN DOC REV: CPT | Performed by: FAMILY MEDICINE

## 2022-09-27 NOTE — PROGRESS NOTES
[facility] Progress Note:   F/up Physician Visit    CC: Weight Management      Gris Holley is a 75 y.o. female who is here for her  f/up physician visit for the  [facility].    She says she is tired of the shakes, feeling frustrated  She wants hot oatmeal in the am during the winter months  She usually eats the oatmeal with no sugar and no milk        Weight Metrics 9/27/2022 9/27/2022 9/15/2022 9/7/2022 8/25/2022 8/23/2022 8/23/2022   Weight - 224 lb 4.8 oz 224 lb 6.9 oz 224 lb 8 oz 226 lb - 228 lb 12.8 oz   Neck Circ (inches) 15.25 - - - - 14.75 -   Waist Measure Inches 44.5 - - - - 45 -   Body Fat % 48.4 - - - - 48.8 -   BMI - 43.81 kg/m2 43.83 kg/m2 43.84 kg/m2 44.14 kg/m2 - 44.68 kg/m2         Outpatient Medications Marked as Taking for the 9/27/22 encounter (Office Visit) with Marianne Carreon MD   Medication Sig Dispense Refill    furosemide (LASIX) 20 mg tablet Take 1 Tablet by mouth daily. (Patient taking differently: Take 20 mg by mouth daily. Per patient, she take PRN) 90 Tablet 0    meloxicam (MOBIC) 15 mg tablet TAKE 1 TABLET BY MOUTH EVERY DAY AS NEEDED FOR PAIN 30 Tablet 0    magnesium 250 mg tab Take 1 Tablet by mouth nightly. irbesartan (AVAPRO) 300 mg tablet TAKE 1 TABLET BY MOUTH EVERY DAY AT NIGHT 90 Tablet 1    acetaminophen (TYLENOL) 650 mg TbER Take 1,300 mg by mouth daily. hydroCHLOROthiazide (HYDRODIURIL) 12.5 mg tablet TAKE 1 TABLET BY MOUTH EVERY DAY 90 Tablet 2    YUVAFEM 10 mcg tab vaginal tablet Insert 10 mcg into vagina two (2) times a week. Comp. Stocking,Knee,Regular,Lrg misc 20-30mmHG 1 Units 2         Participation   Did you attend clinic and class last week? yes    Review of Systems  Since your last visit, have you experienced any complications? no  If yes, please list:       Are you taking an appetite suppressant? No, she does not want to try any meds yet  If so, is there any Chest Pain, Palpitations or Dizziness?         HUNGER CONTROL: fair    BP Readings from Last 3 Encounters:   09/27/22 (!) 158/84   09/15/22 (!) 144/79   09/07/22 (!) 144/79       SLEEP:6    Have you received any other medical care this week? no  If yes, where and for what? Have you discontinued or changed any medicine or dose of your medicine since your last visit with Dr Dilip Alvarez? no  If yes, where and for what? Diet  How many ounces of calorie-free liquids did you consume each day?   oz    How many meal replacements did you take each day? 1-2    Did you have any problems adhering to the program?  no If yes, please explain:      Exercise  Aerobic exercise: 3-4 days in pool 60 min  Resistance exercise: 3-4 workouts / week  Any discomfort?  no     If yes, where? Objective  Visit Vitals  BP (!) 158/84 (BP 1 Location: Left upper arm, BP Patient Position: Sitting, BP Cuff Size: Large adult)   Pulse 88   Temp 98.1 °F (36.7 °C) (Oral)   Resp 18   Ht 5' (1.524 m)   Wt 224 lb 4.8 oz (101.7 kg)   SpO2 96%   BMI 43.81 kg/m²     No LMP recorded. Patient has had a hysterectomy. Physical Exam  Appearance: well,   Mental:A&O x 3, NAD  H:NC/AT,  EENT:   EOMI, PERRL, No scleral icterus  Neck: no bruit or JVD  Lung: clear, No W/R  ABD: soft, active, nontender  Ext:  no Edema  Neuro: nonfocal  Assessment / Plan    Encounter Diagnoses   Name Primary? Obesity, morbid, BMI 40.0-49.9 ([address] Yes    Essential hypertension     Sleep apnea, obstructive     Hypercholesterolemia      Diagnoses and all orders for this visit:    1. Obesity, morbid, BMI 40.0-49.9 ([address]  I told her the oats are ok. Work with the dietitian to make sure it fits in the [address] max  Increase activity with gal eventually of 60 mins 5 days a week  She has not lot any weight since sept 7  I think she is eating more cals than she is mindful of    2. Essential hypertension  Bp high today.  Again I think there is probably junk food or restaurant food leading to this high bp and lack of weight loss  No change in irbesartan 300 mg a day  Hctz 12.5 mg a day  Lasix 20 mg a day  3. Sleep apnea, obstructive  Use cpap daily, work w sleep medicine to find the right setting or mask  4. Hypercholesterolemia  On no meds, using lifestyle change  1. Weight management well controlled   Progress was reviewed with patient  Make an appt with the dietitian  2. Labs    Latest results reviewed with patient       face to face time with Paula consisted of counseling & coordinating and/or discussing treatment plans in reference to her obesity The primary encounter diagnosis was Obesity, morbid, BMI 40.0-49.9 ([facility] Diagnoses of Essential hypertension, Sleep apnea, obstructive, and Hypercholesterolemia were also pertinent to this visit.

## 2022-09-27 NOTE — PROGRESS NOTES
Week # 1    Progress Note: Weekly Education Class in the [facility]         Patient is on Very Low Calorie Diet [] (4 meal replacements per day, 800 kcal/day)      Low Calorie Diet [x] (2-3 meal replacements per day, 4765-1528 kcal/day)    1) Did patient have any new symptoms or physical problems? Yes [x]    No []    If yes, check & comment: weakness [], fatigue [], lightheadedness [x], headache [], cramps [], cold intolerance [x], hair loss [], diarrhea [], constipation [],  NA [] other:                                 2) Has patient had any medical attention from other providers, urgent care or the emergency room this week? Yes []  No [x]       NA [], If yes, why:                                       3) Any other sugar sweetened beverages consumed this week? Yes [x]  No []    4) Did patient have any problems adhering to the diet? Yes []  No [] NA []NOT ANSWERED      If yes, Vacation [], Celebrations [], Conferences [], Family Reunions [] other:                                                5) How many hours of sleep this week? 6-7    (range)  NA []    Number of meal replacements consumed daily? 2  (range)  NA []    Average ounces of water patient consumed daily this week (not including shakes)? 64     (divide the weekly total by 7)    Did you eat any food outside of the program? Yes [x] No []    Physical Activity Over the Past Week:    Cardio exercise: 15 min  Strength exercise: 1 workouts / week  Number of steps walked per day: 0    How has patient mood overall been this week? Sad [], Happy [], Stressed [], Tired [], Content [x], NA [], other            Medications reconciled by nurse Yes [x]  No[]    Patient was given therapeutic recommendations for any noted side effects of their dietary approach based upon [facility] patient manual per providers recommendation.      Week # 2    Progress Note: Weekly Education Class in the [facility]         Patient is on Very Low Calorie Diet [] (4 meal replacements per day, 800 kcal/day)      Low Calorie Diet [x] (2-3 meal replacements per day, 7088-3510 kcal/day)    1) Did patient have any new symptoms or physical problems? Yes [x]    No []    If yes, check & comment: weakness [], fatigue [], lightheadedness [x], headache [], cramps [], cold intolerance [x], hair loss [], diarrhea [], constipation [],  NA [] other:                                 2) Has patient had any medical attention from other providers, urgent care or the emergency room this week? Yes []  No []NOT ANSWERED         NA [], If yes, why:                                       3) Any other sugar sweetened beverages consumed this week? Yes []  No []NOT ANSWERED      4) Did patient have any problems adhering to the diet? Yes []  No [] NA []NOT ANSWERED      If yes, Vacation [], Celebrations [], Conferences [], Family Reunions [] other:                                                5) How many hours of sleep this week? 6-7    (range)  NA []    Number of meal replacements consumed daily? 1-2  (range)  NA []    Average ounces of water patient consumed daily this week (not including shakes)? 64     (divide the weekly total by 7)    Did you eat any food outside of the program? Yes [x] No []    Physical Activity Over the Past Week:    Cardio exercise: 180 min  Strength exercise: 0 workouts / week  Number of steps walked per day: 0    How has patient mood overall been this week? Sad [], Happy [], Stressed [], Tired [], Content [x], NA [], other            Medications reconciled by nurse Yes [x]  No[]    Patient was given therapeutic recommendations for any noted side effects of their dietary approach based upon [facility] patient manual per providers recommendation.

## 2022-09-27 NOTE — PROGRESS NOTES
Weight Management. 1 month follow up. 1. Have you been to the ER, urgent care clinic since your last visit? Hospitalized since your last visit? No    2. Have you seen or consulted any other health care providers outside of the [address] [place] [address] since your last visit? Include any pap smears or colon screening.  No    BMI - 43.6

## 2022-09-28 ENCOUNTER — OFFICE VISIT (OUTPATIENT)
Dept: SURGERY | Age: 67
End: 2022-09-28

## 2022-09-28 DIAGNOSIS — E66.01 OBESITY, MORBID, BMI 40.0-49.9 (HCC): Primary | ICD-10-CM

## 2022-09-29 ENCOUNTER — VIRTUAL VISIT (OUTPATIENT)
Dept: INTERNAL MEDICINE CLINIC | Age: 67
End: 2022-09-29
Payer: MEDICARE

## 2022-09-29 DIAGNOSIS — I10 ESSENTIAL HYPERTENSION: ICD-10-CM

## 2022-09-29 DIAGNOSIS — I50.22 CHRONIC SYSTOLIC CONGESTIVE HEART FAILURE (HCC): Primary | ICD-10-CM

## 2022-09-29 PROCEDURE — G8427 DOCREV CUR MEDS BY ELIG CLIN: HCPCS | Performed by: INTERNAL MEDICINE

## 2022-09-29 PROCEDURE — 3017F COLORECTAL CA SCREEN DOC REV: CPT | Performed by: INTERNAL MEDICINE

## 2022-09-29 PROCEDURE — 1101F PT FALLS ASSESS-DOCD LE1/YR: CPT | Performed by: INTERNAL MEDICINE

## 2022-09-29 PROCEDURE — G8432 DEP SCR NOT DOC, RNG: HCPCS | Performed by: INTERNAL MEDICINE

## 2022-09-29 PROCEDURE — G8536 NO DOC ELDER MAL SCRN: HCPCS | Performed by: INTERNAL MEDICINE

## 2022-09-29 PROCEDURE — G8756 NO BP MEASURE DOC: HCPCS | Performed by: INTERNAL MEDICINE

## 2022-09-29 PROCEDURE — G9899 SCRN MAM PERF RSLTS DOC: HCPCS | Performed by: INTERNAL MEDICINE

## 2022-09-29 PROCEDURE — 1090F PRES/ABSN URINE INCON ASSESS: CPT | Performed by: INTERNAL MEDICINE

## 2022-09-29 PROCEDURE — G8417 CALC BMI ABV UP PARAM F/U: HCPCS | Performed by: INTERNAL MEDICINE

## 2022-09-29 PROCEDURE — G8399 PT W/DXA RESULTS DOCUMENT: HCPCS | Performed by: INTERNAL MEDICINE

## 2022-09-29 PROCEDURE — 99213 OFFICE O/P EST LOW 20 MIN: CPT | Performed by: INTERNAL MEDICINE

## 2022-09-29 NOTE — PROGRESS NOTES
CC: Medication Evaluation and Heart Problem      HPI:    She is a 75 y.o. female who presents for evaluation of heart failure   She has a long history of hypertension and her blood pressure has been higher than goal for some time. She is currently on Avapro 300 and hydrochlorothiazide 12.5 mg I have recommended to add a third agent but patient has refused due to concern of taking too many medicines. She complained of an episode of possibly having loss of consciousness therefore I did a work-up including labs carotid ultrasound and a heart echo  Today she is presenting to discuss the echo findings. I reviewed with her that she is got a decreased ejection fraction of 45 to 50% her previous echo her EF was 65. I suspect she has heart failure from longstanding hypertension its not well controlled. However I discussed with patient that she is to see a cardiologist for further evaluation and consideration of a stress test.  She denies chest pain shortness of breath she reports doing pool exercises and not experiencing any chest pain. Obstructive sleep apnea reports compliance with CPAP          This is an established visit conducted via telemedicine with video. The patient has been instructed that this meets HIPAA criteria and acknowledges and agrees to this method of visitation. Pursuant to the emergency declaration under the [facility], [facility] waiver authority and the [employer] Act, this Virtual Visit was conducted, with patient's consent, to reduce the patient's risk of exposure to COVID-19 and provide continuity of care for an established patient. Services were provided through a video synchronous discussion virtually to substitute for in-person clinic visit.        ROS:  Constitutional: negative for fevers, chills, anorexia and weight loss  10 systems reviewed and negative other than HPI    Past Medical History: Diagnosis Date    HTN (hypertension) 1/15/2010    Sleep apnea 1/20/2015    Sleep apnea, obstructive 1/5/2016       Current Outpatient Medications on File Prior to Visit   Medication Sig Dispense Refill    furosemide (LASIX) 20 mg tablet Take 1 Tablet by mouth daily. (Patient taking differently: Take 20 mg by mouth daily. Per patient, she take PRN) 90 Tablet 0    meloxicam (MOBIC) 15 mg tablet TAKE 1 TABLET BY MOUTH EVERY DAY AS NEEDED FOR PAIN 30 Tablet 0    magnesium 250 mg tab Take 1 Tablet by mouth nightly. irbesartan (AVAPRO) 300 mg tablet TAKE 1 TABLET BY MOUTH EVERY DAY AT NIGHT 90 Tablet 1    acetaminophen (TYLENOL) 650 mg TbER Take 1,300 mg by mouth daily. hydroCHLOROthiazide (HYDRODIURIL) 12.5 mg tablet TAKE 1 TABLET BY MOUTH EVERY DAY 90 Tablet 2    YUVAFEM 10 mcg tab vaginal tablet Insert 10 mcg into vagina two (2) times a week. Comp. Stocking,Knee,Regular,Lrg misc 20-30mmHG 1 Units 2    cyanocobalamin 1,000 mcg tablet Take 1,000 mcg by mouth daily. No current facility-administered medications on file prior to visit. Past Surgical History:   Procedure Laterality Date    HX APPENDECTOMY      HX HYSTERECTOMY  approx 1987 2/2 uterine fibroids    HX OTHER SURGICAL  2002    hiatal hernia repair        Family History   Problem Relation Age of Onset    Cancer Mother         oral    Hypertension Father     Prostate Cancer Father     Hypertension Sister     Hypertension Brother      Reviewed and no changes     Social History     Socioeconomic History    Marital status: SINGLE     Spouse name: Not on file    Number of children: Not on file    Years of education: Not on file    Highest education level: Not on file   Occupational History    Not on file   Tobacco Use    Smoking status: Never    Smokeless tobacco: Never   Vaping Use    Vaping Use: Never used   Substance and Sexual Activity    Alcohol use:  Yes     Alcohol/week: 4.2 standard drinks     Types: 5 Glasses of wine per week Comment: 1 glass per night    Drug use: No    Sexual activity: Yes     Partners: Male     Birth control/protection: Condom   Other Topics Concern    Not on file   Social History Narrative    Not on file     Social Determinants of Health     Financial Resource Strain: Low Risk     Difficulty of Paying Living Expenses: Not very hard   Food Insecurity: No Food Insecurity    Worried About Running Out of Food in the Last Year: Never true    Ran Out of Food in the Last Year: Never true   Transportation Needs: Not on file   Physical Activity: Not on file   Stress: Not on file   Social Connections: Not on file   Intimate Partner Violence: Not on file   Housing Stability: Not on file          There were no vitals taken for this visit. Physical Examination:   Gen: well appearing female  HEENT: normal conjunctiva, no audible congestion, patient does not see oral erythema, has MMM  Neck: patient does not feel enlarged or tender LAD or masses  Resp: normal respiratory effort, no audible wheezing. CV: patient does not feel palpitations or heart irregularity  Abd: patient does not feel abdominal tenderness or mass, patient does not notice distension  Extrem: patient does not see swelling in ankles or joints.    Neuro: Alert and oriented, able to answer questions without difficulty, able to move all extremities and walk normally          Lab Results   Component Value Date/Time    WBC 7.6 08/25/2022 01:01 PM    HGB 13.4 08/25/2022 01:01 PM    HCT 42.4 08/25/2022 01:01 PM    PLATELET 342 92/77/5266 01:01 PM    MCV 91.0 08/25/2022 01:01 PM     Lab Results   Component Value Date/Time    Sodium 140 08/25/2022 01:01 PM    Potassium 3.9 08/25/2022 01:01 PM    Chloride 101 08/25/2022 01:01 PM    CO2 33 (H) 08/25/2022 01:01 PM    Anion gap 6 08/25/2022 01:01 PM    Glucose 75 08/25/2022 01:01 PM    BUN 20 08/25/2022 01:01 PM    Creatinine 0.96 08/25/2022 01:01 PM    BUN/Creatinine ratio 21 (H) 08/25/2022 01:01 PM    GFR est AA >60 08/25/2022 01:01 PM    GFR est non-AA 58 (L) 08/25/2022 01:01 PM    Calcium 9.7 08/25/2022 01:01 PM     Lab Results   Component Value Date/Time    Cholesterol, total 221 (H) 05/11/2022 09:23 AM    HDL Cholesterol 75 05/11/2022 09:23 AM    LDL, calculated 130 (H) 05/11/2022 09:23 AM    LDL, calculated 110 (H) 09/03/2021 08:50 AM    VLDL, calculated 16 05/11/2022 09:23 AM    VLDL, calculated 14 09/03/2021 08:50 AM    Triglyceride 93 05/11/2022 09:23 AM    CHOL/HDL Ratio 2.4 09/03/2021 08:50 AM     Lab Results   Component Value Date/Time    TSH 1.780 05/11/2022 09:23 AM     No results found for: PSA, Dwight Barker, [id number], [id number]  Lab Results   Component Value Date/Time    Hemoglobin A1c 5.4 05/11/2022 09:23 AM     Lab Results   Component Value Date/Time    Vitamin D 25-Hydroxy 32.3 04/14/2022 11:44 AM       Lab Results   Component Value Date/Time    ALT (SGPT) 20 08/25/2022 01:01 PM    Alk. phosphatase 87 08/25/2022 01:01 PM    Bilirubin, total 0.4 08/25/2022 01:01 PM           Assessment/Plan:  1. Chronic systolic congestive heart failure (HCC)  -New mildly decreased ejection fraction. This is likely due to longstanding hypertension however referral to cardiologist done. Consider stress test.  Since she has a diagnosis of heart failure I will defer to cardiology-she is currently on Avapro. She would benefit from a beta-blocker and possibly Entresto. I did not make any changes to her medication regimen since she will follow-up with cardiology. Raul Viramontes MD    This is an established visit conducted via real time video and audio telemedicine. The patient has been instructed that this meets HIPAA criteria and acknowledges and agrees to this method of visitation.

## 2022-09-29 NOTE — PROGRESS NOTES
[facility]  Metabolic Weight Loss Program        Patient's Name: Jeniffer Archibald  : 1955    This patient is enrolled in [address] [place] [address] Weight Loss Program and attended the required weekly virtual nutrition class hosted via [facility].       Yanira Little, MS, RD, LDN

## 2022-09-29 NOTE — PROGRESS NOTES
1. \"Have you been to the ER, urgent care clinic since your last visit? Hospitalized since your last visit? \" No    2. \"Have you seen or consulted any other health care providers outside of the [address] [place] [address] since your last visit? \" No     3. For patients aged 39-70: Has the patient had a colonoscopy / FIT/ Cologuard? Yes - no Care Gap present      If the patient is female:    4. For patients aged 41-77: Has the patient had a mammogram within the past 2 years? Yes - no Care Gap present      5. For patients aged 21-65: Has the patient had a pap smear?  NA - based on age or sex

## 2022-09-30 NOTE — PROGRESS NOTES
[facility]  Metabolic Weight Loss Program        Patient's Name: Sheryle Helm  : 1955    This patient is enrolled in [address] [place] [address] Weight Loss Program and attended the required weekly virtual nutrition class hosted via [employer] today.       Dallie Lanes, MS, RD, LDN

## 2022-10-03 NOTE — PROGRESS NOTES
Nurse note from patient's weekly / LCD / Maintenance class was reviewed. Pertinent medical concerns were:   reviewed     BP Readings from Last 3 Encounters:   09/27/22 (!) 158/84   09/15/22 (!) 144/79   09/07/22 (!) 144/79       Weight Metrics 9/27/2022 9/27/2022 9/15/2022 9/7/2022 8/25/2022 8/23/2022 8/23/2022   Weight - 224 lb 4.8 oz 224 lb 6.9 oz 224 lb 8 oz 226 lb - 228 lb 12.8 oz   Neck Circ (inches) 15.25 - - - - 14.75 -   Waist Measure Inches 44.5 - - - - 45 -   Body Fat % 48.4 - - - - 48.8 -   BMI - 43.81 kg/m2 43.83 kg/m2 43.84 kg/m2 44.14 kg/m2 - 44.68 kg/m2       Current Outpatient Medications   Medication Sig Dispense Refill    cyanocobalamin 1,000 mcg tablet Take 1,000 mcg by mouth daily. furosemide (LASIX) 20 mg tablet Take 1 Tablet by mouth daily. (Patient taking differently: Take 20 mg by mouth daily. Per patient, she take PRN) 90 Tablet 0    meloxicam (MOBIC) 15 mg tablet TAKE 1 TABLET BY MOUTH EVERY DAY AS NEEDED FOR PAIN 30 Tablet 0    magnesium 250 mg tab Take 1 Tablet by mouth nightly. irbesartan (AVAPRO) 300 mg tablet TAKE 1 TABLET BY MOUTH EVERY DAY AT NIGHT 90 Tablet 1    acetaminophen (TYLENOL) 650 mg TbER Take 1,300 mg by mouth daily. hydroCHLOROthiazide (HYDRODIURIL) 12.5 mg tablet TAKE 1 TABLET BY MOUTH EVERY DAY 90 Tablet 2    YUVAFEM 10 mcg tab vaginal tablet Insert 10 mcg into vagina two (2) times a week. Comp. Stocking,Knee,Regular,Lrg misc 20-30mmHG 1 Units 2

## 2022-10-03 NOTE — PROGRESS NOTES
The holter monitor showed a short burst of fast heart rhythm that did not show symptoms .  Overall normal heart rhythm can discuss further with cardiologist at your appointment October 10th with Dr Lisa Jackson

## 2022-10-05 ENCOUNTER — OFFICE VISIT (OUTPATIENT)
Dept: SURGERY | Age: 67
End: 2022-10-05

## 2022-10-05 DIAGNOSIS — E66.01 OBESITY, MORBID, BMI 40.0-49.9 (HCC): Primary | ICD-10-CM

## 2022-10-07 ENCOUNTER — OFFICE VISIT (OUTPATIENT)
Dept: CARDIOLOGY CLINIC | Age: 67
End: 2022-10-07
Payer: MEDICARE

## 2022-10-07 VITALS
OXYGEN SATURATION: 98 % | RESPIRATION RATE: 16 BRPM | SYSTOLIC BLOOD PRESSURE: 140 MMHG | BODY MASS INDEX: 43.78 KG/M2 | DIASTOLIC BLOOD PRESSURE: 80 MMHG | HEIGHT: 60 IN | HEART RATE: 77 BPM | WEIGHT: 223 LBS

## 2022-10-07 DIAGNOSIS — R55 SYNCOPE, UNSPECIFIED SYNCOPE TYPE: Primary | ICD-10-CM

## 2022-10-07 DIAGNOSIS — I10 ESSENTIAL HYPERTENSION: ICD-10-CM

## 2022-10-07 PROCEDURE — G8753 SYS BP > OR = 140: HCPCS | Performed by: SPECIALIST

## 2022-10-07 PROCEDURE — 93000 ELECTROCARDIOGRAM COMPLETE: CPT | Performed by: SPECIALIST

## 2022-10-07 PROCEDURE — 1123F ACP DISCUSS/DSCN MKR DOCD: CPT | Performed by: SPECIALIST

## 2022-10-07 PROCEDURE — G8754 DIAS BP LESS 90: HCPCS | Performed by: SPECIALIST

## 2022-10-07 PROCEDURE — G8510 SCR DEP NEG, NO PLAN REQD: HCPCS | Performed by: SPECIALIST

## 2022-10-07 PROCEDURE — 3017F COLORECTAL CA SCREEN DOC REV: CPT | Performed by: SPECIALIST

## 2022-10-07 PROCEDURE — G8427 DOCREV CUR MEDS BY ELIG CLIN: HCPCS | Performed by: SPECIALIST

## 2022-10-07 PROCEDURE — G8536 NO DOC ELDER MAL SCRN: HCPCS | Performed by: SPECIALIST

## 2022-10-07 PROCEDURE — G8399 PT W/DXA RESULTS DOCUMENT: HCPCS | Performed by: SPECIALIST

## 2022-10-07 PROCEDURE — 99204 OFFICE O/P NEW MOD 45 MIN: CPT | Performed by: SPECIALIST

## 2022-10-07 PROCEDURE — 1090F PRES/ABSN URINE INCON ASSESS: CPT | Performed by: SPECIALIST

## 2022-10-07 PROCEDURE — G9899 SCRN MAM PERF RSLTS DOC: HCPCS | Performed by: SPECIALIST

## 2022-10-07 PROCEDURE — 1101F PT FALLS ASSESS-DOCD LE1/YR: CPT | Performed by: SPECIALIST

## 2022-10-07 PROCEDURE — G8417 CALC BMI ABV UP PARAM F/U: HCPCS | Performed by: SPECIALIST

## 2022-10-07 RX ORDER — METOPROLOL SUCCINATE 25 MG/1
25 TABLET, EXTENDED RELEASE ORAL DAILY
Qty: 90 TABLET | Refills: 2 | Status: SHIPPED | OUTPATIENT
Start: 2022-10-07

## 2022-10-07 NOTE — PROGRESS NOTES
Visit Vitals  BP (!) 140/80   Pulse 77   Resp 16   Ht 5' (1.524 m)   Wt 223 lb (101.2 kg)   SpO2 98%   BMI 43.55 kg/m²

## 2022-10-07 NOTE — PROGRESS NOTES
[facility]                                                            OFFICE NOTE        Ailin Stearns M.D.,FREDDY Wanda Cornea   1955  [id number]    Date/Time:  10/7/87892:22 AM            SUBJECTIVE:  She does have some lower extremity edema although much better than in August 2022. Shortness of breath at baseline. She is able to do water aerobics twice a week with no particular issues and if she does kickboxing also with no issues she tells me. No chest pain reported. No palpitations. Questionable episode of \"syncope\" in August 2022 at which time she did not seek for medical attention. Assessment/Plan    1. Cardiomyopathy: She does seem to have mild cardiomyopathy with ejection fraction of 45 to 50%. Etiology of this is unclear. Seemingly relatively asymptomatic at this time. Continue same dose of Avapro and hydrochlorothiazide. Start Toprol-XL 25 mg nightly. She will take Avapro in the morning. Proceed with nuclear stress test.    Her electrocardiogram reveals a normal sinus rhythm with borderline left atrial enlargement. We have discussed the significance implication of mild cardiomyopathy. Continue with a TSH in May 2022 was normal.  proBNP in August thousand 22 was essentially normal as well    2. Hypertension: Upper limits of normal.  Starting Toprol. 3.  Hyperlipidemia: Closely followed by her primary care physician on no statin at this point. 4.  Obstructive sleep apnea: On CPAP. 5.  PSVT: Short episodes noted on Holter monitor. Starting Toprol as well.   See her back after nuclear stress              HPI   Very pleasant 69-year-old female with past medical history most remarkable for hypertension and sleep apnea on CPAP who presents today for cardiac evaluation of the abnormal echocardiogram.    To be noted the patient experience COVID in August 2022. She has some issues with dyspnea at that time. She tells me she feels better at this point. Past medical past medical history: As above. Past Surgical History:   Procedure Laterality Date    HX APPENDECTOMY      HX HYSTERECTOMY  approx 1987    2/2 uterine fibroids    HX OTHER SURGICAL  2002    hiatal hernia repair      Social history: She does not smoke she drinks occasionally. She is retired from [employer]. Family history: On significant coronary events. CARDIAC STUDIES      Holter monitor on September thousand and 22 with short burst of PSVT of 46 beats in duration. 09/15/22    ECHO ADULT COMPLETE 09/15/2022 9/15/2022    Interpretation Summary    Left Ventricle: Unable to assess left ventricular systolic function with a visually estimated EF of 45 - 50%. Left ventricle size is normal. Normal wall thickness. Findings consistent with mild eccentric hypertrophy. Unable to assess wall motion. Right Ventricle: Not assessed due to poor image quality. Mitral Valve: Mild regurgitation. Technical qualifiers: Echo study was technically difficult, technically difficult due to patient's body habitus and a technically difficult Doppler study. Contrast used: Definity. Signed by: Claire Brooks MD on 9/15/2022  5:48 PM                              EKG Results       None                IMAGING      MRI Results (most recent):  Results from Hospital Encounter encounter on 03/31/16    MRI SHOULDER RT WO CONT    Narrative  **Final Report**      ICD Codes / Adm. Diagnosis: 715.91  726.2 / Osteoarthrosis, unspecified wh  Other affections of shoulder  Examination:   SHOULDER BENJY CON RT  - [id number] - Mar 31 2016  7:49AM  Accession No:  [id number]  Reason:  degenerative joint disease of rt shoulder      REPORT:  EXAM:  MR SHOULDER WO CON RT    INDICATION: Right shoulder pain.  No injury    COMPARISON: None    TECHNIQUE: Axial proton density fat-saturated; oblique coronal T1, T2  fat-saturated, and proton density fat-saturated; and oblique sagittal T2  fat-saturated MRI of the right shoulder. CONTRAST: None. FINDINGS: A.C. joint: There is moderate degeneration of the  acromioclavicular joint. Minimal subacromial spurring Anterior acromion  process type: 2    Bone marrow: There is irregularity of the marrow of the inferior medial  humeral head however this is interspersed with fat of uncertain but doubtful  clinical significance. Joint fluid: There is trace fluid subdeltoid bursa    Rotator cuff tendons: There is tendinopathy of the supraspinatus and  anterior infraspinatus with diffuse shallow partial-thickness undersurface  tearing. Question small high-grade partial-thickness interstitial versus  undersurface tear posterior supraspinatus insertion. No full-thickness  component to the tear. Subscapularis and teres minor are intact    Biceps tendon: Intact and located within the bicipital groove. There is an 8  mm loose body within the biceps long head tendon sheath    Muscles: There is minimal atrophy of the infraspinatus    Glenoid labrum: Intact. Joint capsule: within normal limits. Glenohumeral articular cartilage: Intact. No focal osteochondral lesion. Soft tissue mass: None. Impression  :  1. Tendinopathy of the supraspinatus and anterior infraspinatus with diffuse  shallow partial-thickness undersurface tearing. Question small high-grade  partial-thickness undersurface/interstitial tear of the posterior  supraspinatus. No full-thickness tear  2. 8 mm loose body in the biceps long head tendon sheath. A donor site is  not identified          Signing/Reading Doctor: Ny Bell ([id number])  ApprovedLevy Lefort ([id number])  Mar 31 2016  8:11AM      CT Results (most recent):  Results from [place] encounter on 10/01/14    CT ABD PELV W CONT    Narrative  **Final Report**      ICD Codes / Adm. Diagnosis: 782.3   / Edema  Examination:  CT ABD PELVIS W CON  - [id number] - Oct  1 2014  8:00AM  Accession No:  [id number]  Reason:  bilateral LE edema      REPORT:  Clinical indication: Bilateral lower extremity edema. Axial CT scan of the abdomen and pelvis obtained after intravenous injection  of 100 cc Optiray-350 and after oral contrast. No prior. Lung bases appear unremarkable. Liver and spleen are normal in size with no  discrete mass or duct dilatation. The gallbladder is not distended, the  adrenals and pancreas appear unremarkable. There has been prior anterior  abdominal wall surgery and hysterectomy. There is no free air or free fluid. There is no bowel wall thickening or obstruction. The kidneys show small  cyst, no obstruction, calcification or solid masses. The bladder is midline. Impression  : No significant abnormalities. Signing/Reading Doctor: Maria Luisa Baltazar ([id number])  Approved: Maria Luisa Baltazar ([id number])  Oct  1 2014  9:32AM      XR Results (most recent):  Results from Hospital Encounter encounter on 08/25/22    XR CHEST PA LAT    Narrative  Indication:  Wheezing, syncope. Exam: PA and lateral views of the chest.    Direct comparison is made to prior CXR dated January 2013. Findings: Cardiomediastinal silhouette is within normal limits. Lungs are clear  bilaterally. Pleural spaces are normal. Osseous structures are intact. Impression  No acute cardiopulmonary disease. Past Medical History:   Diagnosis Date    HTN (hypertension) 1/15/2010    Sleep apnea 1/20/2015    Sleep apnea, obstructive 1/5/2016     Past Surgical History:   Procedure Laterality Date    HX APPENDECTOMY      HX HYSTERECTOMY  approx 1987    2/2 uterine fibroids    HX OTHER SURGICAL  2002    hiatal hernia repair      Social History     Tobacco Use    Smoking status: Never    Smokeless tobacco: Never   Vaping Use    Vaping Use: Never used   Substance Use Topics    Alcohol use:  Yes     Alcohol/week: 4.2 standard drinks     Types: 5 Glasses of wine per week     Comment: 1 glass per night    Drug use: No     Family History   Problem Relation Age of Onset    Cancer Mother         oral    Hypertension Father     Prostate Cancer Father     Hypertension Sister     Hypertension Brother      No Known Allergies      There were no vitals taken for this visit. There were no vitals filed for this visit. Review of Systems:   Pertinent items are noted in the History of Present Illness. Neck: no JVD  Heart: regular rate and rhythm  Lungs: clear to auscultation bilaterally  Abdomen: soft, non-tender. Bowel sounds normal. No masses,  no organomegaly  Extremities: edema 1-2+ b/l      Current Outpatient Medications on File Prior to Visit   Medication Sig Dispense Refill    cyanocobalamin 1,000 mcg tablet Take 1,000 mcg by mouth daily. furosemide (LASIX) 20 mg tablet Take 1 Tablet by mouth daily. (Patient taking differently: Take 20 mg by mouth daily. Per patient, she take PRN) 90 Tablet 0    meloxicam (MOBIC) 15 mg tablet TAKE 1 TABLET BY MOUTH EVERY DAY AS NEEDED FOR PAIN 30 Tablet 0    magnesium 250 mg tab Take 1 Tablet by mouth nightly. irbesartan (AVAPRO) 300 mg tablet TAKE 1 TABLET BY MOUTH EVERY DAY AT NIGHT 90 Tablet 1    acetaminophen (TYLENOL) 650 mg TbER Take 1,300 mg by mouth daily. hydroCHLOROthiazide (HYDRODIURIL) 12.5 mg tablet TAKE 1 TABLET BY MOUTH EVERY DAY 90 Tablet 2    YUVAFEM 10 mcg tab vaginal tablet Insert 10 mcg into vagina two (2) times a week. Comp. Stocking,Knee,Regular,Lrg misc 20-30mmHG 1 Units 2     No current facility-administered medications on file prior to visit. Carmen Housedden had no medications administered during this visit. Current Outpatient Medications   Medication Sig    cyanocobalamin 1,000 mcg tablet Take 1,000 mcg by mouth daily. furosemide (LASIX) 20 mg tablet Take 1 Tablet by mouth daily. (Patient taking differently: Take 20 mg by mouth daily.  Per patient, she take PRN) meloxicam (MOBIC) 15 mg tablet TAKE 1 TABLET BY MOUTH EVERY DAY AS NEEDED FOR PAIN    magnesium 250 mg tab Take 1 Tablet by mouth nightly. irbesartan (AVAPRO) 300 mg tablet TAKE 1 TABLET BY MOUTH EVERY DAY AT NIGHT    acetaminophen (TYLENOL) 650 mg TbER Take 1,300 mg by mouth daily. hydroCHLOROthiazide (HYDRODIURIL) 12.5 mg tablet TAKE 1 TABLET BY MOUTH EVERY DAY    YUVAFEM 10 mcg tab vaginal tablet Insert 10 mcg into vagina two (2) times a week. Comp. Stocking,Knee,Regular,Lrg misc 20-30mmHG     No current facility-administered medications for this visit. Lab Results   Component Value Date/Time    Cholesterol, total 221 (H) 05/11/2022 09:23 AM    HDL Cholesterol 75 05/11/2022 09:23 AM    LDL, calculated 130 (H) 05/11/2022 09:23 AM    LDL, calculated 110 (H) 09/03/2021 08:50 AM    VLDL, calculated 16 05/11/2022 09:23 AM    VLDL, calculated 14 09/03/2021 08:50 AM    Triglyceride 93 05/11/2022 09:23 AM    CHOL/HDL Ratio 2.4 09/03/2021 08:50 AM       Lab Results   Component Value Date/Time    Sodium 140 08/25/2022 01:01 PM    Potassium 3.9 08/25/2022 01:01 PM    Chloride 101 08/25/2022 01:01 PM    CO2 33 (H) 08/25/2022 01:01 PM    Anion gap 6 08/25/2022 01:01 PM    Glucose 75 08/25/2022 01:01 PM    BUN 20 08/25/2022 01:01 PM    Creatinine 0.96 08/25/2022 01:01 PM    BUN/Creatinine ratio 21 (H) 08/25/2022 01:01 PM    GFR est AA >60 08/25/2022 01:01 PM    GFR est non-AA 58 (L) 08/25/2022 01:01 PM    Calcium 9.7 08/25/2022 01:01 PM       Lab Results   Component Value Date/Time    ALT (SGPT) 20 08/25/2022 01:01 PM    Alk.  phosphatase 87 08/25/2022 01:01 PM    Bilirubin, total 0.4 08/25/2022 01:01 PM       Lab Results   Component Value Date/Time    WBC 7.6 08/25/2022 01:01 PM    HGB 13.4 08/25/2022 01:01 PM    HCT 42.4 08/25/2022 01:01 PM    PLATELET 936 43/39/0953 01:01 PM    MCV 91.0 08/25/2022 01:01 PM       Lab Results   Component Value Date/Time    TSH 1.780 05/11/2022 09:23 AM         Lab Results Component Value Date/Time    Cholesterol, total 221 (H) 05/11/2022 09:23 AM    Cholesterol, total 210 (H) 09/03/2021 08:50 AM    Cholesterol, total 230 (H) 07/02/2020 11:22 AM    Cholesterol, total 215 (H) 03/26/2018 10:59 AM    Cholesterol, total 220 (H) 01/31/2017 11:33 AM    HDL Cholesterol 75 05/11/2022 09:23 AM    HDL Cholesterol 86 09/03/2021 08:50 AM    HDL Cholesterol 64 07/02/2020 11:22 AM    HDL Cholesterol 56 03/26/2018 10:59 AM    HDL Cholesterol 59 01/31/2017 11:33 AM    LDL, calculated 130 (H) 05/11/2022 09:23 AM    LDL, calculated 110 (H) 09/03/2021 08:50 AM    LDL, calculated 145 (H) 07/02/2020 11:22 AM    LDL, calculated 133 (H) 03/26/2018 10:59 AM    LDL, calculated 133 (H) 01/31/2017 11:33 AM    LDL, calculated 135 (H) 06/05/2015 08:38 AM    Triglyceride 93 05/11/2022 09:23 AM    Triglyceride 70 09/03/2021 08:50 AM    Triglyceride 107 07/02/2020 11:22 AM    Triglyceride 129 03/26/2018 10:59 AM    Triglyceride 142 01/31/2017 11:33 AM    CHOL/HDL Ratio 2.4 09/03/2021 08:50 AM    CHOL/HDL Ratio 4.0 09/08/2010 09:44 AM    CHOL/HDL Ratio 3.6 02/19/2010 10:41 AM    CHOL/HDL Ratio 3.4 02/16/2009 09:30 AM                Please note that this dictation was completed with [employer], the [employer] voice recognition software. Quite often unanticipated grammatical, syntax, homophones, and other interpretative errors are inadvertently transcribed by the computer software. Please disregard these errors. Please excuse any errors that have escaped final proofreading.

## 2022-10-10 NOTE — PROGRESS NOTES
[facility]  Metabolic Weight Loss Program        Patient's Name: Ruby Webb  : 1955    This patient is enrolled in [address] [place] [address] Weight Loss Program and attended the required weekly virtual nutrition class hosted via [employer].       Carolynne Oppenheim, MS, RD, LDN

## 2022-10-12 ENCOUNTER — OFFICE VISIT (OUTPATIENT)
Dept: SURGERY | Age: 67
End: 2022-10-12

## 2022-10-12 DIAGNOSIS — E66.01 OBESITY, MORBID, BMI 40.0-49.9 (HCC): Primary | ICD-10-CM

## 2022-10-13 NOTE — PROGRESS NOTES
[facility]  Metabolic Weight Loss Program        Patient's Name: Ellen Bowling  : 1955    This patient is enrolled in [address] [place] [address] Weight Loss Program and attended the required weekly virtual nutrition class hosted via [address] [place] [address] today.       Jose Borrego, MS, RD, LDN

## 2022-10-15 DIAGNOSIS — I10 ESSENTIAL HYPERTENSION: ICD-10-CM

## 2022-10-16 RX ORDER — HYDROCHLOROTHIAZIDE 12.5 MG/1
TABLET ORAL
Qty: 90 TABLET | Refills: 2 | Status: SHIPPED | OUTPATIENT
Start: 2022-10-16

## 2022-10-19 ENCOUNTER — OFFICE VISIT (OUTPATIENT)
Dept: SURGERY | Age: 67
End: 2022-10-19

## 2022-10-19 DIAGNOSIS — E66.01 OBESITY, MORBID, BMI 40.0-49.9 (HCC): Primary | ICD-10-CM

## 2022-10-21 NOTE — PROGRESS NOTES
[facility]  Metabolic Weight Loss Program        Patient's Name: Jaxson Chavez  : 1955    This patient is enrolled in [address] [place] [address] Weight Loss Program and attended the required weekly virtual nutrition class hosted via [employer].       Jerry Guido, MS, RD, LDN

## 2022-10-25 ENCOUNTER — DOCUMENTATION ONLY (OUTPATIENT)
Dept: SURGERY | Age: 67
End: 2022-10-25

## 2022-10-25 ENCOUNTER — OFFICE VISIT (OUTPATIENT)
Dept: SURGERY | Age: 67
End: 2022-10-25
Payer: MEDICARE

## 2022-10-25 VITALS
HEIGHT: 60 IN | BODY MASS INDEX: 43.92 KG/M2 | RESPIRATION RATE: 18 BRPM | WEIGHT: 223.7 LBS | HEART RATE: 75 BPM | OXYGEN SATURATION: 96 % | SYSTOLIC BLOOD PRESSURE: 154 MMHG | TEMPERATURE: 98.7 F | DIASTOLIC BLOOD PRESSURE: 80 MMHG

## 2022-10-25 DIAGNOSIS — G47.33 SLEEP APNEA, OBSTRUCTIVE: ICD-10-CM

## 2022-10-25 DIAGNOSIS — E78.00 HYPERCHOLESTEROLEMIA: ICD-10-CM

## 2022-10-25 DIAGNOSIS — E66.01 OBESITY, MORBID, BMI 40.0-49.9 (HCC): Primary | ICD-10-CM

## 2022-10-25 DIAGNOSIS — I10 ESSENTIAL HYPERTENSION: ICD-10-CM

## 2022-10-25 PROCEDURE — 1090F PRES/ABSN URINE INCON ASSESS: CPT | Performed by: FAMILY MEDICINE

## 2022-10-25 PROCEDURE — 1101F PT FALLS ASSESS-DOCD LE1/YR: CPT | Performed by: FAMILY MEDICINE

## 2022-10-25 PROCEDURE — G8754 DIAS BP LESS 90: HCPCS | Performed by: FAMILY MEDICINE

## 2022-10-25 PROCEDURE — 3078F DIAST BP <80 MM HG: CPT | Performed by: FAMILY MEDICINE

## 2022-10-25 PROCEDURE — G8417 CALC BMI ABV UP PARAM F/U: HCPCS | Performed by: FAMILY MEDICINE

## 2022-10-25 PROCEDURE — G8399 PT W/DXA RESULTS DOCUMENT: HCPCS | Performed by: FAMILY MEDICINE

## 2022-10-25 PROCEDURE — G8432 DEP SCR NOT DOC, RNG: HCPCS | Performed by: FAMILY MEDICINE

## 2022-10-25 PROCEDURE — 3074F SYST BP LT 130 MM HG: CPT | Performed by: FAMILY MEDICINE

## 2022-10-25 PROCEDURE — G8536 NO DOC ELDER MAL SCRN: HCPCS | Performed by: FAMILY MEDICINE

## 2022-10-25 PROCEDURE — G9899 SCRN MAM PERF RSLTS DOC: HCPCS | Performed by: FAMILY MEDICINE

## 2022-10-25 PROCEDURE — G8753 SYS BP > OR = 140: HCPCS | Performed by: FAMILY MEDICINE

## 2022-10-25 PROCEDURE — 99214 OFFICE O/P EST MOD 30 MIN: CPT | Performed by: FAMILY MEDICINE

## 2022-10-25 PROCEDURE — G8427 DOCREV CUR MEDS BY ELIG CLIN: HCPCS | Performed by: FAMILY MEDICINE

## 2022-10-25 PROCEDURE — 3017F COLORECTAL CA SCREEN DOC REV: CPT | Performed by: FAMILY MEDICINE

## 2022-10-25 PROCEDURE — 1123F ACP DISCUSS/DSCN MKR DOCD: CPT | Performed by: FAMILY MEDICINE

## 2022-10-25 RX ORDER — TOPIRAMATE 25 MG/1
25 TABLET ORAL
Qty: 30 TABLET | Refills: 6 | Status: SHIPPED | OUTPATIENT
Start: 2022-10-25 | End: 2022-11-10 | Stop reason: ALTCHOICE

## 2022-10-25 NOTE — PROGRESS NOTES
Weight Management. 1 month follow up. 1. Have you been to the ER, urgent care clinic since your last visit? Hospitalized since your last visit? No    2. Have you seen or consulted any other health care providers outside of the [address] [place] [address] since your last visit? Include any pap smears or colon screening.  No    BMI - 43.5

## 2022-10-25 NOTE — PROGRESS NOTES
Prior Authorization started for Topiramate 25 mg via [employer].       Key # [id number]    [facility]    ID # [id number]

## 2022-10-25 NOTE — PROGRESS NOTES
[facility] Progress Note:   F/up Physician Visit    CC: Weight Management      López Sandra is a 75 y.o. female who is here for her  f/up physician visit for the LCD Program.    She does 3 days a week exercise in pool  She has started eating oatmeal for breakfast  A meal for lunch  and a replacement for dinner      She feels frustrated as if she is being punished to be trying to lose weight  She has heart disease and she is being told be cardiology that she needs to lose weight  The pressure of it all is bothering her today    Weight Metrics 10/25/2022 10/25/2022 10/7/2022 9/27/2022 9/27/2022 9/15/2022 9/7/2022   Weight - 223 lb 11.2 oz 223 lb - 224 lb 4.8 oz 224 lb 6.9 oz 224 lb 8 oz   Neck Circ (inches) 15 - - 15.25 - - -   Waist Measure Inches 46.25 - - 44.5 - - -   Body Fat % 48.4 - - 48.4 - - -   BMI - 43.69 kg/m2 43.55 kg/m2 - 43.81 kg/m2 43.83 kg/m2 43.84 kg/m2         Outpatient Medications Marked as Taking for the 10/25/22 encounter (Office Visit) with Chau Pino MD   Medication Sig Dispense Refill    topiramate (Topamax) 25 mg tablet Take 1 Tablet by mouth daily (with dinner). 30 Tablet 6    hydroCHLOROthiazide (HYDRODIURIL) 12.5 mg tablet TAKE 1 TABLET BY MOUTH EVERY DAY 90 Tablet 2    metoprolol succinate (TOPROL-XL) 25 mg XL tablet Take 1 Tablet by mouth daily. 90 Tablet 2    furosemide (LASIX) 20 mg tablet Take 1 Tablet by mouth daily. (Patient taking differently: Take 20 mg by mouth daily. Per patient, she take PRN) 90 Tablet 0    meloxicam (MOBIC) 15 mg tablet TAKE 1 TABLET BY MOUTH EVERY DAY AS NEEDED FOR PAIN 30 Tablet 0    magnesium 250 mg tab Take 1 Tablet by mouth nightly. irbesartan (AVAPRO) 300 mg tablet TAKE 1 TABLET BY MOUTH EVERY DAY AT NIGHT 90 Tablet 1    acetaminophen (TYLENOL) 650 mg TbER Take 1,300 mg by mouth daily. YUVAFEM 10 mcg tab vaginal tablet Insert 10 mcg into vagina two (2) times a week. Comp. Stocking,Knee,Regular,Lrg misc 20-30mmHG 1 Units 2         Participation   Did you attend clinic and class last week? yes    Review of Systems  Since your last visit, have you experienced any complications? no  If yes, please list:       Are you taking an appetite suppressant? no  If so, is there any Chest Pain, Palpitations or Dizziness? HUNGER CONTROL: poor    BP Readings from Last 3 Encounters:   10/25/22 (!) 154/80   10/07/22 (!) 140/80   09/27/22 (!) 158/84       SLEEP: insomnia    Have you received any other medical care this week? no  If yes, where and for what? Have you discontinued or changed any medicine or dose of your medicine since your last visit with Dr Aguilar Thompson? no  If yes, where and for what? Diet  How many ounces of calorie-free liquids did you consume each day? Not able to report oz    How many meal replacements did you take each day? ?    Did you have any problems adhering to the program?  yes If yes, please explain:      Exercise  Aerobic exercise:   min  Resistance exercise:  workouts / week  Any discomfort? If yes, where? Objective  Visit Vitals  BP (!) 154/80 (BP 1 Location: Right arm, BP Patient Position: Sitting, BP Cuff Size: Large adult)   Pulse 75   Temp 98.7 °F (37.1 °C)   Resp 18   Ht 5' (1.524 m)   Wt 223 lb 11.2 oz (101.5 kg)   SpO2 96%   BMI 43.69 kg/m²     No LMP recorded. Patient has had a hysterectomy. Physical Exam  Appearance: well,   Mental:A&O x 3, NAD  H:NC/AT,  EENT:   EOMI, PERRL, No scleral icterus  Neck: no bruit or JVD  Lung: clear, No W/R  ABD: soft, active, nontender  Ext:  no Edema  Neuro: nonfocal  Assessment / Plan    Encounter Diagnoses   Name Primary? Essential hypertension     Obesity, morbid, BMI 40.0-49.9 ([facility] [address] Yes    Hypercholesterolemia     Sleep apnea, obstructive      Diagnoses and all orders for this visit:    1. Obesity, morbid, BMI 40.0-49.9 ([facility])  -     topiramate (Topamax) 25 mg tablet; Take 1 Tablet by mouth daily (with dinner).   She cannot take phentermine or other stimulants due to heart disease  Medicare does not cover obesity meds so saxenda or wegocey are not options  Trying topamax   2. Essential hypertension  Metoprolol xl 25 mg a day  Lasix  Avapro 300 mg a day  Hctz 12.5 a day  She is very upset-tearful   Bp likely high due to her emotional upset  3. Hypercholesterolemia  The lifestyle changes are traetment  4. Sleep apnea, obstructive  Encouraging use of cpap  1. Weight management control uncertain   Progress was reviewed with patient    2. Labs    Latest results reviewed with patient       face to face time with Paula consisted of counseling & coordinating and/or discussing treatment plans in reference to her obesity The primary encounter diagnosis was Obesity, morbid, BMI 40.0-49.9 ([facility] [address] Diagnoses of Essential hypertension, Hypercholesterolemia, and Sleep apnea, obstructive were also pertinent to this visit.

## 2022-10-26 ENCOUNTER — OFFICE VISIT (OUTPATIENT)
Dept: SURGERY | Age: 67
End: 2022-10-26

## 2022-10-26 DIAGNOSIS — E66.01 OBESITY, MORBID, BMI 40.0-49.9 (HCC): Primary | ICD-10-CM

## 2022-10-26 NOTE — PROGRESS NOTES
Prior Auth for Topiramate denied. Case ID # [id number]    Denied under Medicare Part D coverage. Drug excluded from coverage under the Medicare Part D benefit when used for smoking cessation of weight loss. Patient advised of denial.  Patient also advised medication very inexpensive for out-of-pocket cost.  Advised to use [employer] or [employer] bri at her pharmacy.   Patient verbalized understanding of all

## 2022-11-02 ENCOUNTER — OFFICE VISIT (OUTPATIENT)
Dept: SURGERY | Age: 67
End: 2022-11-02

## 2022-11-02 DIAGNOSIS — E66.01 OBESITY, MORBID, BMI 40.0-49.9 (HCC): Primary | ICD-10-CM

## 2022-11-07 ENCOUNTER — TELEPHONE (OUTPATIENT)
Dept: INTERNAL MEDICINE CLINIC | Age: 67
End: 2022-11-07

## 2022-11-07 NOTE — TELEPHONE ENCOUNTER
Two pt identifiers confirmed. I advised the patient per , she is to go to the ED, the patient refused to go, she stated all I need is an appointment.  was made aware the patient would not go to the ED. I asked her if she was a cardiologist, she avoided the question and did not answer. I asked her if she has another PCP. She reported, she is not seeing a new PCP, it is to see a nutritionist. The patient stated again, she was not going to the ED, she wanted an appointment with . A VV was scheduled with . I advised the patient to come to the office for a nurse visit to get her VS. The patient stated she has a BP cuff at home. Pt verbalized understanding of information discussed w/ no further questions at this time.   Signed By: Lorie Ellis LPN     November 7, 9382

## 2022-11-07 NOTE — TELEPHONE ENCOUNTER
Patient states she believes she is having a medication reaction    States she believes its:  irbesartan (AVAPRO) 300 mg tablet      States thinks does needs to be adjusted or alternative    Symptoms when taking:  Confusion, black out  Chill comes over her within an hour of taking med and that starts it, if she sits down she blacks out    Onset 2 weeks

## 2022-11-10 ENCOUNTER — OFFICE VISIT (OUTPATIENT)
Dept: INTERNAL MEDICINE CLINIC | Age: 67
End: 2022-11-10
Payer: MEDICARE

## 2022-11-10 VITALS
HEIGHT: 60 IN | RESPIRATION RATE: 18 BRPM | WEIGHT: 223 LBS | SYSTOLIC BLOOD PRESSURE: 197 MMHG | OXYGEN SATURATION: 99 % | BODY MASS INDEX: 43.78 KG/M2 | HEART RATE: 72 BPM | DIASTOLIC BLOOD PRESSURE: 77 MMHG | TEMPERATURE: 98.2 F

## 2022-11-10 DIAGNOSIS — I10 ESSENTIAL HYPERTENSION: ICD-10-CM

## 2022-11-10 DIAGNOSIS — I50.22 CHRONIC SYSTOLIC CONGESTIVE HEART FAILURE (HCC): Primary | ICD-10-CM

## 2022-11-10 DIAGNOSIS — R41.0 CONFUSION: ICD-10-CM

## 2022-11-10 PROCEDURE — G8510 SCR DEP NEG, NO PLAN REQD: HCPCS | Performed by: INTERNAL MEDICINE

## 2022-11-10 PROCEDURE — G8417 CALC BMI ABV UP PARAM F/U: HCPCS | Performed by: INTERNAL MEDICINE

## 2022-11-10 PROCEDURE — 3017F COLORECTAL CA SCREEN DOC REV: CPT | Performed by: INTERNAL MEDICINE

## 2022-11-10 PROCEDURE — G8399 PT W/DXA RESULTS DOCUMENT: HCPCS | Performed by: INTERNAL MEDICINE

## 2022-11-10 PROCEDURE — G9899 SCRN MAM PERF RSLTS DOC: HCPCS | Performed by: INTERNAL MEDICINE

## 2022-11-10 PROCEDURE — 99214 OFFICE O/P EST MOD 30 MIN: CPT | Performed by: INTERNAL MEDICINE

## 2022-11-10 PROCEDURE — G8753 SYS BP > OR = 140: HCPCS | Performed by: INTERNAL MEDICINE

## 2022-11-10 PROCEDURE — G8754 DIAS BP LESS 90: HCPCS | Performed by: INTERNAL MEDICINE

## 2022-11-10 PROCEDURE — 1101F PT FALLS ASSESS-DOCD LE1/YR: CPT | Performed by: INTERNAL MEDICINE

## 2022-11-10 PROCEDURE — G8427 DOCREV CUR MEDS BY ELIG CLIN: HCPCS | Performed by: INTERNAL MEDICINE

## 2022-11-10 PROCEDURE — G8536 NO DOC ELDER MAL SCRN: HCPCS | Performed by: INTERNAL MEDICINE

## 2022-11-10 PROCEDURE — 1090F PRES/ABSN URINE INCON ASSESS: CPT | Performed by: INTERNAL MEDICINE

## 2022-11-10 RX ORDER — IRBESARTAN 150 MG/1
150 TABLET ORAL
Qty: 90 TABLET | Refills: 1 | Status: SHIPPED | OUTPATIENT
Start: 2022-11-10

## 2022-11-10 NOTE — PROGRESS NOTES
1. \"Have you been to the ER, urgent care clinic since your last visit? Hospitalized since your last visit? \" No    2. \"Have you seen or consulted any other health care providers outside of the [address] [place] [address] since your last visit? \" No     3. For patients aged 39-70: Has the patient had a colonoscopy / FIT/ Cologuard? Yes - Care Gap present. Most recent result on file      If the patient is female:    4. For patients aged 41-77: Has the patient had a mammogram within the past 2 years? Yes - Care Gap present. Most recent result on file      5. For patients aged 21-65: Has the patient had a pap smear? Yes - Care Gap present.  Most recent result on file

## 2022-11-10 NOTE — PROGRESS NOTES
Ms. Gaetano Drummond is presenting to follow up     CC:  Medication Problem and Dizziness (Delusional, sweats, And chills.)       HPI:    She is a 75 y.o. female who presents for evaluation of HTN, heart failure and possible med side effects    She has a long history of hypertension and her blood pressure has been higher than goal for some time. She is currently on Avapro 300 and hydrochlorothiazide 12.5 mg . She complained of an episode of possibly having loss of consciousness therefore I did a work-up including labs carotid ultrasound and a heart echo   I reviewed with her that she has ejection fraction of 45 to 50% her previous echo her EF was 65. I referred her to cards     She saw cards Dr Reed Smith on 10/07      Cardiomyopathy: She does seem to have mild cardiomyopathy with ejection fraction of 45 to 50%. Etiology of this is unclear. Seemingly relatively asymptomatic at this time. Continue same dose of Avapro and hydrochlorothiazide. Start Toprol-XL 25 mg nightly. She will take Avapro in the morning. Proceed with nuclear stress test.     Patient started this regimen and initially was going well   The November 1st started to feel dizzy, lightheaded, and reports possible syncope  ( no head trauma) patient was sitting at the table and felt that a coldness and sleepy sensation came over her.    She did not have bowel or bladder incontinence or arm movements   This happened 1 hours after taking irbesartan  She attributed this to medication   Patient stopped irbesartan 300 mg on the 11/06    She is not taking HCTZ consistently     Since then she is having episodes of confusion /calling family members she cannot recall later     Carotid US was normal in the fall     She is seen nutritionist on 11/22 weight loss program and exercising frequently and eating healthy     Today she has no neurological complaints and no chest pain but her blood pressure is very elevated   Review of systems:  Constitutional: negative for fever, chills, weight loss, night sweats   10 systems reviewed and negative other then HPI       Past Medical History:   Diagnosis Date    HTN (hypertension) 1/15/2010    Sleep apnea 1/20/2015    Sleep apnea, obstructive 1/5/2016        Past Surgical History:   Procedure Laterality Date    HX APPENDECTOMY      HX HYSTERECTOMY  approx 1987    2/2 uterine fibroids    HX OTHER SURGICAL  2002    hiatal hernia repair        No Known Allergies    Current Outpatient Medications on File Prior to Visit   Medication Sig Dispense Refill    hydroCHLOROthiazide (HYDRODIURIL) 12.5 mg tablet TAKE 1 TABLET BY MOUTH EVERY DAY 90 Tablet 2    metoprolol succinate (TOPROL-XL) 25 mg XL tablet Take 1 Tablet by mouth daily. 90 Tablet 2    meloxicam (MOBIC) 15 mg tablet TAKE 1 TABLET BY MOUTH EVERY DAY AS NEEDED FOR PAIN 30 Tablet 0    acetaminophen (TYLENOL) 650 mg TbER Take 1,300 mg by mouth daily. YUVAFEM 10 mcg tab vaginal tablet Insert 10 mcg into vagina two (2) times a week. Comp. Stocking,Knee,Regular,Lrg misc 20-30mmHG 1 Units 2    topiramate (Topamax) 25 mg tablet Take 1 Tablet by mouth daily (with dinner). (Patient not taking: Reported on 11/10/2022) 30 Tablet 6    furosemide (LASIX) 20 mg tablet Take 1 Tablet by mouth daily. (Patient not taking: Reported on 11/10/2022) 90 Tablet 0    magnesium 250 mg tab Take 1 Tablet by mouth nightly. irbesartan (AVAPRO) 300 mg tablet TAKE 1 TABLET BY MOUTH EVERY DAY AT NIGHT (Patient not taking: Reported on 11/10/2022) 90 Tablet 1     No current facility-administered medications on file prior to visit. family history includes Cancer in her mother; Hypertension in her brother, father, and sister; Prostate Cancer in her father.     Social History     Socioeconomic History    Marital status: SINGLE     Spouse name: Not on file    Number of children: Not on file    Years of education: Not on file    Highest education level: Not on file   Occupational History    Not on file   Tobacco Use    Smoking status: Never    Smokeless tobacco: Never   Vaping Use    Vaping Use: Never used   Substance and Sexual Activity    Alcohol use: Yes     Alcohol/week: 4.2 standard drinks     Types: 5 Glasses of wine per week     Comment: 1 glass per night    Drug use: No    Sexual activity: Yes     Partners: Male     Birth control/protection: Condom   Other Topics Concern    Not on file   Social History Narrative    Not on file     Social Determinants of Health     Financial Resource Strain: Low Risk     Difficulty of Paying Living Expenses: Not very hard   Food Insecurity: No Food Insecurity    Worried About Running Out of Food in the Last Year: Never true    Ran Out of Food in the Last Year: Never true   Transportation Needs: Not on file   Physical Activity: Not on file   Stress: Not on file   Social Connections: Not on file   Intimate Partner Violence: Not on file   Housing Stability: Not on file       Visit Vitals  BP (!) 197/77   Pulse 72   Temp 98.2 °F (36.8 °C) (Temporal)   Resp 18   Ht 5' (1.524 m)   Wt 223 lb (101.2 kg)   SpO2 99%   BMI 43.55 kg/m²     General:  Well appearing female no acute distress  HEENT:   PERRL,normal conjunctiva. External ear and canals normal, TMs normal.  Hearing normal to voice. Nose without edema or discharge, normal septum. Lips, teeth, gums normal.  Oropharynx: no erythema, no exudates, no lesions, normal tongue. Neck:  Supple. Thyroid normal size, nontender, without nodules. No carotid bruit. No masses or lymphadenopathy  Respiratory: no respiratory distress,  no wheezing, no rhonchi, no rales. No chest wall tenderness. Cardiovascular:  RRR, normal S1S2, no murmur. Gastrointestinal: normal bowel sounds, soft, nontender, without masses. No hepatosplenomegaly. Extremities +2 pulses, no edema, normal sensation   Musculoskeletal:  Normal gait. Normal digits and nails. Normal strength and tone, no atrophy, and no abnormal movement.   Skin:  No rash, no lesions, no ulcers. Skin warm, normal turgor, without induration or nodules. Neuro:  A and OX4, fluent speech, cranial nerves normal 2-12. Sensation normal to light touch. DTR symmetrical  Psych:  Normal affect      Lab Results   Component Value Date/Time    WBC 7.6 08/25/2022 01:01 PM    HGB 13.4 08/25/2022 01:01 PM    HCT 42.4 08/25/2022 01:01 PM    PLATELET 782 56/78/5878 01:01 PM    MCV 91.0 08/25/2022 01:01 PM     Lab Results   Component Value Date/Time    Sodium 140 08/25/2022 01:01 PM    Potassium 3.9 08/25/2022 01:01 PM    Chloride 101 08/25/2022 01:01 PM    CO2 33 (H) 08/25/2022 01:01 PM    Anion gap 6 08/25/2022 01:01 PM    Glucose 75 08/25/2022 01:01 PM    BUN 20 08/25/2022 01:01 PM    Creatinine 0.96 08/25/2022 01:01 PM    BUN/Creatinine ratio 21 (H) 08/25/2022 01:01 PM    GFR est AA >60 08/25/2022 01:01 PM    GFR est non-AA 58 (L) 08/25/2022 01:01 PM    Calcium 9.7 08/25/2022 01:01 PM     Lab Results   Component Value Date/Time    Cholesterol, total 221 (H) 05/11/2022 09:23 AM    HDL Cholesterol 75 05/11/2022 09:23 AM    LDL, calculated 130 (H) 05/11/2022 09:23 AM    LDL, calculated 110 (H) 09/03/2021 08:50 AM    VLDL, calculated 16 05/11/2022 09:23 AM    VLDL, calculated 14 09/03/2021 08:50 AM    Triglyceride 93 05/11/2022 09:23 AM    CHOL/HDL Ratio 2.4 09/03/2021 08:50 AM     Lab Results   Component Value Date/Time    TSH 1.780 05/11/2022 09:23 AM     Lab Results   Component Value Date/Time    Hemoglobin A1c 5.4 05/11/2022 09:23 AM     Lab Results   Component Value Date/Time    Vitamin D 25-Hydroxy 32.3 04/14/2022 11:44 AM                   Assessment and Plan:     1.  Episode of Confusion  Reported confusion possible syncope she associates with avapro metoprolol and HCTZ combination but notes symmptoms 1 hours after avapro  She stopped avapro   Discussed her BP is dangerously high   She agrees to resume avapro but wants lower dose 150mg   She is to continue metoprolol at bedtime ( her pulse is fine today ) sinus on exam  She is asymptomatic today  and non focal exal  She had carotid US done two months ago which was normal and ECHO with new mild heart failure that is being evaluated by cards     2. Chronic systolic congestive heart failure ([address]  She saw cards and has stress test scheduled  - irbesartan (AVAPRO) 150 mg tablet; Take 1 Tablet by mouth nightly. Dispense: 90 Tablet; Refill: 1  - CBC WITH AUTOMATED DIFF; Future  - TSH 3RD GENERATION; Future  - METABOLIC PANEL, COMPREHENSIVE; Future  - METABOLIC PANEL, COMPREHENSIVE  - TSH 3RD GENERATION  - CBC WITH AUTOMATED DIFF    3. Essential hypertension - BP is high only taking metoprolol   Continue metoprolol and HCTZ( she wants to hold off on HCTZ   - irbesartan (AVAPRO) 150 mg tablet; Take 1 Tablet by mouth nightly. Dispense: 90 Tablet; Refill: 1  - CBC WITH AUTOMATED DIFF; Future  - TSH 3RD GENERATION; Future  - METABOLIC PANEL, COMPREHENSIVE; Future  - URINALYSIS W/ RFLX MICROSCOPIC;  Future  - URINALYSIS W/ RFLX MICROSCOPIC  - METABOLIC PANEL, COMPREHENSIVE  - TSH 3RD GENERATION  - CBC WITH AUTOMATED DIFF      Monitor home BP   Jo Lopez MD

## 2022-11-10 NOTE — PATIENT INSTRUCTIONS
Decrease dose of avapro 150mg daily   Continue the toprol  Monitor blood pressure   Return  for blood pressure next Tuesday or Wednesday    If another episode of confusion happens please go to the emergency room

## 2022-11-11 ENCOUNTER — APPOINTMENT (OUTPATIENT)
Dept: CT IMAGING | Age: 67
End: 2022-11-11
Attending: EMERGENCY MEDICINE
Payer: MEDICARE

## 2022-11-11 ENCOUNTER — HOSPITAL ENCOUNTER (EMERGENCY)
Age: 67
Discharge: HOME | End: 2022-11-12
Attending: EMERGENCY MEDICINE
Payer: MEDICARE

## 2022-11-11 VITALS
TEMPERATURE: 97.6 F | OXYGEN SATURATION: 99 % | HEIGHT: 60 IN | DIASTOLIC BLOOD PRESSURE: 77 MMHG | RESPIRATION RATE: 19 BRPM | BODY MASS INDEX: 43.67 KG/M2 | SYSTOLIC BLOOD PRESSURE: 194 MMHG | HEART RATE: 58 BPM | WEIGHT: 222.44 LBS

## 2022-11-11 DIAGNOSIS — I16.0 HYPERTENSIVE URGENCY: Primary | ICD-10-CM

## 2022-11-11 LAB
ALBUMIN SERPL-MCNC: 3.6 G/DL (ref 3.5–5)
ALBUMIN SERPL-MCNC: 3.9 G/DL (ref 3.5–5)
ALBUMIN/GLOB SERPL: 0.9 {RATIO} (ref 1.1–2.2)
ALBUMIN/GLOB SERPL: 1.1 {RATIO} (ref 1.1–2.2)
ALP SERPL-CCNC: 83 U/L (ref 45–117)
ALP SERPL-CCNC: 95 U/L (ref 45–117)
ALT SERPL-CCNC: 23 U/L (ref 12–78)
ALT SERPL-CCNC: 26 U/L (ref 12–78)
ANION GAP SERPL CALC-SCNC: 5 MMOL/L (ref 5–15)
ANION GAP SERPL CALC-SCNC: 5 MMOL/L (ref 5–15)
APPEARANCE UR: CLEAR
AST SERPL-CCNC: 25 U/L (ref 15–37)
AST SERPL-CCNC: 31 U/L (ref 15–37)
BASOPHILS # BLD: 0 K/UL (ref 0–0.1)
BASOPHILS # BLD: 0 K/UL (ref 0–0.1)
BASOPHILS NFR BLD: 0 % (ref 0–1)
BASOPHILS NFR BLD: 0 % (ref 0–1)
BILIRUB SERPL-MCNC: 0.2 MG/DL (ref 0.2–1)
BILIRUB SERPL-MCNC: 0.4 MG/DL (ref 0.2–1)
BILIRUB UR QL: NEGATIVE
BNP SERPL-MCNC: 79 PG/ML
BUN SERPL-MCNC: 15 MG/DL (ref 6–20)
BUN SERPL-MCNC: 22 MG/DL (ref 6–20)
BUN/CREAT SERPL: 17 (ref 12–20)
BUN/CREAT SERPL: 23 (ref 12–20)
CALCIUM SERPL-MCNC: 9.3 MG/DL (ref 8.5–10.1)
CALCIUM SERPL-MCNC: 9.7 MG/DL (ref 8.5–10.1)
CHLORIDE SERPL-SCNC: 106 MMOL/L (ref 97–108)
CHLORIDE SERPL-SCNC: 106 MMOL/L (ref 97–108)
CO2 SERPL-SCNC: 29 MMOL/L (ref 21–32)
CO2 SERPL-SCNC: 30 MMOL/L (ref 21–32)
COLOR UR: ABNORMAL
CREAT SERPL-MCNC: 0.89 MG/DL (ref 0.55–1.02)
CREAT SERPL-MCNC: 0.94 MG/DL (ref 0.55–1.02)
DIFFERENTIAL METHOD BLD: ABNORMAL
DIFFERENTIAL METHOD BLD: ABNORMAL
EOSINOPHIL # BLD: 0 K/UL (ref 0–0.4)
EOSINOPHIL # BLD: 0.1 K/UL (ref 0–0.4)
EOSINOPHIL NFR BLD: 0 % (ref 0–7)
EOSINOPHIL NFR BLD: 1 % (ref 0–7)
ERYTHROCYTE [DISTWIDTH] IN BLOOD BY AUTOMATED COUNT: 13.4 % (ref 11.5–14.5)
ERYTHROCYTE [DISTWIDTH] IN BLOOD BY AUTOMATED COUNT: 13.6 % (ref 11.5–14.5)
GLOBULIN SER CALC-MCNC: 3.7 G/DL (ref 2–4)
GLOBULIN SER CALC-MCNC: 3.8 G/DL (ref 2–4)
GLUCOSE SERPL-MCNC: 64 MG/DL (ref 65–100)
GLUCOSE SERPL-MCNC: 72 MG/DL (ref 65–100)
GLUCOSE UR STRIP.AUTO-MCNC: NEGATIVE MG/DL
HCT VFR BLD AUTO: 42.8 % (ref 35–47)
HCT VFR BLD AUTO: 44.5 % (ref 35–47)
HGB BLD-MCNC: 13.6 G/DL (ref 11.5–16)
HGB BLD-MCNC: 13.9 G/DL (ref 11.5–16)
HGB UR QL STRIP: ABNORMAL
IMM GRANULOCYTES # BLD AUTO: 0 K/UL (ref 0–0.04)
IMM GRANULOCYTES # BLD AUTO: 0.1 K/UL (ref 0–0.04)
IMM GRANULOCYTES NFR BLD AUTO: 0 % (ref 0–0.5)
IMM GRANULOCYTES NFR BLD AUTO: 1 % (ref 0–0.5)
KETONES UR QL STRIP.AUTO: NEGATIVE MG/DL
LEUKOCYTE ESTERASE UR QL STRIP.AUTO: NEGATIVE
LYMPHOCYTES # BLD: 1.3 K/UL (ref 0.8–3.5)
LYMPHOCYTES # BLD: 1.9 K/UL (ref 0.8–3.5)
LYMPHOCYTES NFR BLD: 15 % (ref 12–49)
LYMPHOCYTES NFR BLD: 25 % (ref 12–49)
MCH RBC QN AUTO: 28.4 PG (ref 26–34)
MCH RBC QN AUTO: 28.9 PG (ref 26–34)
MCHC RBC AUTO-ENTMCNC: 31.2 G/DL (ref 30–36.5)
MCHC RBC AUTO-ENTMCNC: 31.8 G/DL (ref 30–36.5)
MCV RBC AUTO: 91 FL (ref 80–99)
MCV RBC AUTO: 91.1 FL (ref 80–99)
MONOCYTES # BLD: 0.4 K/UL (ref 0–1)
MONOCYTES # BLD: 0.4 K/UL (ref 0–1)
MONOCYTES NFR BLD: 4 % (ref 5–13)
MONOCYTES NFR BLD: 5 % (ref 5–13)
NEUTS SEG # BLD: 5.2 K/UL (ref 1.8–8)
NEUTS SEG # BLD: 7.2 K/UL (ref 1.8–8)
NEUTS SEG NFR BLD: 69 % (ref 32–75)
NEUTS SEG NFR BLD: 81 % (ref 32–75)
NITRITE UR QL STRIP.AUTO: NEGATIVE
NRBC # BLD: 0 K/UL (ref 0–0.01)
NRBC # BLD: 0 K/UL (ref 0–0.01)
NRBC BLD-RTO: 0 PER 100 WBC
NRBC BLD-RTO: 0 PER 100 WBC
PH UR STRIP: 6.5 [PH] (ref 5–8)
PLATELET # BLD AUTO: 372 K/UL (ref 150–400)
PLATELET # BLD AUTO: 427 K/UL (ref 150–400)
PMV BLD AUTO: 9.2 FL (ref 8.9–12.9)
POTASSIUM SERPL-SCNC: 4 MMOL/L (ref 3.5–5.1)
POTASSIUM SERPL-SCNC: 4.1 MMOL/L (ref 3.5–5.1)
PROT SERPL-MCNC: 7.4 G/DL (ref 6.4–8.2)
PROT SERPL-MCNC: 7.6 G/DL (ref 6.4–8.2)
PROT UR STRIP-MCNC: NEGATIVE MG/DL
RBC # BLD AUTO: 4.7 M/UL (ref 3.8–5.2)
RBC # BLD AUTO: 4.89 M/UL (ref 3.8–5.2)
SODIUM SERPL-SCNC: 140 MMOL/L (ref 136–145)
SODIUM SERPL-SCNC: 141 MMOL/L (ref 136–145)
SP GR UR REFRACTOMETRY: 1.01 (ref 1–1.03)
TROPONIN-HIGH SENSITIVITY: 10 NG/L (ref 0–51)
TSH SERPL DL<=0.05 MIU/L-ACNC: 1.8 UIU/ML (ref 0.36–3.74)
UROBILINOGEN UR QL STRIP.AUTO: 0.2 EU/DL (ref 0.2–1)
WBC # BLD AUTO: 7.6 K/UL (ref 3.6–11)
WBC # BLD AUTO: 9 K/UL (ref 3.6–11)

## 2022-11-11 PROCEDURE — 99284 EMERGENCY DEPT VISIT MOD MDM: CPT

## 2022-11-11 PROCEDURE — 83880 ASSAY OF NATRIURETIC PEPTIDE: CPT

## 2022-11-11 PROCEDURE — 80053 COMPREHEN METABOLIC PANEL: CPT

## 2022-11-11 PROCEDURE — 70450 CT HEAD/BRAIN W/O DYE: CPT

## 2022-11-11 PROCEDURE — 74011250637 HC RX REV CODE- 250/637: Performed by: EMERGENCY MEDICINE

## 2022-11-11 PROCEDURE — 84484 ASSAY OF TROPONIN QUANT: CPT

## 2022-11-11 PROCEDURE — 36415 COLL VENOUS BLD VENIPUNCTURE: CPT

## 2022-11-11 PROCEDURE — 93005 ELECTROCARDIOGRAM TRACING: CPT

## 2022-11-11 PROCEDURE — 85025 COMPLETE CBC W/AUTO DIFF WBC: CPT

## 2022-11-11 PROCEDURE — 74011250636 HC RX REV CODE- 250/636: Performed by: EMERGENCY MEDICINE

## 2022-11-11 PROCEDURE — 96374 THER/PROPH/DIAG INJ IV PUSH: CPT

## 2022-11-11 RX ORDER — AMLODIPINE BESYLATE 5 MG/1
10 TABLET ORAL
Status: COMPLETED | OUTPATIENT
Start: 2022-11-11 | End: 2022-11-11

## 2022-11-11 RX ORDER — HYDRALAZINE HYDROCHLORIDE 20 MG/ML
20 INJECTION INTRAMUSCULAR; INTRAVENOUS ONCE
Status: COMPLETED | OUTPATIENT
Start: 2022-11-11 | End: 2022-11-11

## 2022-11-11 RX ADMIN — HYDRALAZINE HYDROCHLORIDE 20 MG: 20 INJECTION INTRAMUSCULAR; INTRAVENOUS at 22:28

## 2022-11-11 RX ADMIN — AMLODIPINE BESYLATE 10 MG: 5 TABLET ORAL at 22:29

## 2022-11-12 LAB
ATRIAL RATE: 58 BPM
CALCULATED P AXIS, ECG09: 42 DEGREES
CALCULATED R AXIS, ECG10: 8 DEGREES
CALCULATED T AXIS, ECG11: 47 DEGREES
DIAGNOSIS, 93000: NORMAL
P-R INTERVAL, ECG05: 178 MS
Q-T INTERVAL, ECG07: 480 MS
QRS DURATION, ECG06: 94 MS
QTC CALCULATION (BEZET), ECG08: 471 MS
VENTRICULAR RATE, ECG03: 58 BPM

## 2022-11-12 RX ORDER — AMLODIPINE BESYLATE 10 MG/1
10 TABLET ORAL DAILY
Qty: 30 TABLET | Refills: 1 | Status: SHIPPED | OUTPATIENT
Start: 2022-11-12

## 2022-11-12 NOTE — DISCHARGE INSTRUCTIONS
Thank you for your patience this evening. The cause of your confusion episodes is not entirely clear to me. I wonder if you may be experiencing hypertensive encephalopathy, which is when a person can become confused or act strangely in the setting of extremely high blood pressures. Currently your blood pressure is improving with treatments that we gave you, and there are no signs of encephalopathy or confusion here in the emergency department. I would recommend that you continue all of your medications as currently prescribed, including your HCTZ and irbesartan, and in addition you should start taking amlodipine 10 mg every day, starting tomorrow morning. Follow-up closely with your primary care physician.

## 2022-11-12 NOTE — ED PROVIDER NOTES
EMERGENCY DEPARTMENT HISTORY AND PHYSICAL EXAM           Date: 11/11/2022  Patient Name: Blank Wolf  Patient Age and Sex: 75 y.o. female  MRN:  [id number]  CSN:  [id number]    History of Presenting Illness     Chief Complaint   Patient presents with    Medication Problem     Having a bad reaction to a certain medication Chucho Carver has delusions (pt has had this off and on) since November 1st; saw the doctor for this and continues with this and has times she doesn't remember. Pt has sweats. Pt dose changed to 150mg. Has chills right now. Chills       History Provided By: Patient    Ability to gather history was limited by:     HPI: Blank Wolf, 75 y.o. female with history of hypertension, sleep apnea, complains of episodes of what she describes as confusion and acting \"delusional\" which have been intermittent for the last few weeks. These episodes typically occurred in the middle of the day, when she will start to feel very tired, then started acting strangely, saying nonsensical things to family members. Moderate severity. Typically last a number of minutes and then resolved. She has not had any other focal neurologic symptoms, no weakness or numbness or dysarthria. No drugs or alcohol involved. At times she cannot remember what occurred and family members tell her about how she was acting. She has not had any new pain. Her blood pressures have been higher than they used to be, despite taking her blood pressure medications. No headaches. Location:    Quality:      Severity:    Duration:   Timing:      Context:    Modifying factors:   Associated symptoms:     Past History     The patient's medical, surgical, and social history on file were reviewed by me today.     The family history was reviewed by me today and was non-contributory, unless otherwise specified below:    Past Medical History:  Past Medical History:   Diagnosis Date    HTN (hypertension) 1/15/2010    Sleep apnea 1/20/2015 Sleep apnea, obstructive 1/5/2016       Past Surgical History:  Past Surgical History:   Procedure Laterality Date    HX APPENDECTOMY      HX HYSTERECTOMY  approx 1987 2/2 uterine fibroids    HX OTHER SURGICAL  2002    hiatal hernia repair        Family History:  Family History   Problem Relation Age of Onset    Cancer Mother         oral    Hypertension Father     Prostate Cancer Father     Hypertension Sister     Hypertension Brother        Social History:  Social History     Tobacco Use    Smoking status: Never    Smokeless tobacco: Never   Vaping Use    Vaping Use: Never used   Substance Use Topics    Alcohol use: Yes     Alcohol/week: 4.2 standard drinks     Types: 5 Glasses of wine per week     Comment: 1 glass per night    Drug use: No       Current Medications:  No current facility-administered medications on file prior to encounter. Current Outpatient Medications on File Prior to Encounter   Medication Sig Dispense Refill    irbesartan (AVAPRO) 150 mg tablet Take 1 Tablet by mouth nightly. 90 Tablet 1    hydroCHLOROthiazide (HYDRODIURIL) 12.5 mg tablet TAKE 1 TABLET BY MOUTH EVERY DAY 90 Tablet 2    metoprolol succinate (TOPROL-XL) 25 mg XL tablet Take 1 Tablet by mouth daily. 90 Tablet 2    furosemide (LASIX) 20 mg tablet Take 1 Tablet by mouth daily. (Patient not taking: Reported on 11/10/2022) 90 Tablet 0    meloxicam (MOBIC) 15 mg tablet TAKE 1 TABLET BY MOUTH EVERY DAY AS NEEDED FOR PAIN 30 Tablet 0    magnesium 250 mg tab Take 1 Tablet by mouth nightly. acetaminophen (TYLENOL) 650 mg TbER Take 1,300 mg by mouth daily. YUVAFEM 10 mcg tab vaginal tablet Insert 10 mcg into vagina two (2) times a week. Comp. Stocking,Knee,Regular,Lrg misc 20-30mmHG 1 Units 2       Allergies:  No Known Allergies  Review of Systems   A complete ROS was reviewed by me today and was negative, unless otherwise specified below:    Review of Systems   Constitutional:  Negative for fatigue and fever. Respiratory:  Negative for shortness of breath. Cardiovascular:  Negative for chest pain. Gastrointestinal:  Negative for abdominal pain. Neurological:  Negative for headaches. Psychiatric/Behavioral:  Positive for behavioral problems and confusion. All other systems reviewed and are negative. Physical Exam   Vital Signs  No data found. Physical Exam  Vitals and nursing note reviewed. Constitutional:       General: She is not in acute distress. Appearance: Normal appearance. She is well-developed. She is obese. She is not ill-appearing. HENT:      Head: Normocephalic and atraumatic. Mouth/Throat:      Mouth: Mucous membranes are moist.   Eyes:      General:         Right eye: No discharge. Left eye: No discharge. Conjunctiva/sclera: Conjunctivae normal.   Cardiovascular:      Rate and Rhythm: Normal rate and regular rhythm. Heart sounds: Normal heart sounds. No murmur heard. Pulmonary:      Effort: Pulmonary effort is normal. No respiratory distress. Breath sounds: Normal breath sounds. No wheezing. Abdominal:      General: There is no distension. Palpations: Abdomen is soft. Tenderness: There is no abdominal tenderness. Musculoskeletal:         General: No deformity. Normal range of motion. Cervical back: Normal range of motion and neck supple. Skin:     General: Skin is warm and dry. Findings: No rash. Neurological:      General: No focal deficit present. Mental Status: She is alert and oriented to person, place, and time. GCS: GCS eye subscore is 4. GCS verbal subscore is 5. GCS motor subscore is 6. Cranial Nerves: Cranial nerves 2-12 are intact. No cranial nerve deficit, dysarthria or facial asymmetry. Sensory: Sensation is intact. Motor: Motor function is intact. Coordination: Coordination is intact.  Finger-Nose-Finger Test normal.   Psychiatric:         Speech: Speech normal.         Behavior: Behavior normal.         Cognition and Memory: Cognition normal.       Diagnostic Study Results   Labs  Recent Results (from the past 24 hour(s))   EKG, 12 LEAD, INITIAL    Collection Time: 11/11/22  7:20 PM   Result Value Ref Range    Ventricular Rate 58 BPM    Atrial Rate 58 BPM    P-R Interval 178 ms    QRS Duration 94 ms    Q-T Interval 480 ms    QTC Calculation (Bezet) 471 ms    Calculated P Axis 42 degrees    Calculated R Axis 8 degrees    Calculated T Axis 47 degrees    Diagnosis       Sinus bradycardia  No previous ECGs available  Confirmed by Jim Blanchard ([id number]) on 11/12/2022 11:27:24 AM     CBC WITH AUTOMATED DIFF    Collection Time: 11/11/22  7:23 PM   Result Value Ref Range    WBC 9.0 3.6 - 11.0 K/uL    RBC 4.70 3.80 - 5.20 M/uL    HGB 13.6 11.5 - 16.0 g/dL    HCT 42.8 35.0 - 47.0 %    MCV 91.1 80.0 - 99.0 FL    MCH 28.9 26.0 - 34.0 PG    MCHC 31.8 30.0 - 36.5 g/dL    RDW 13.4 11.5 - 14.5 %    PLATELET 379 713 - 335 K/uL    MPV 9.2 8.9 - 12.9 FL    NRBC 0.0 0  WBC    ABSOLUTE NRBC 0.00 0.00 - 0.01 K/uL    NEUTROPHILS 81 (H) 32 - 75 %    LYMPHOCYTES 15 12 - 49 %    MONOCYTES 4 (L) 5 - 13 %    EOSINOPHILS 0 0 - 7 %    BASOPHILS 0 0 - 1 %    IMMATURE GRANULOCYTES 0 0.0 - 0.5 %    ABS. NEUTROPHILS 7.2 1.8 - 8.0 K/UL    ABS. LYMPHOCYTES 1.3 0.8 - 3.5 K/UL    ABS. MONOCYTES 0.4 0.0 - 1.0 K/UL    ABS. EOSINOPHILS 0.0 0.0 - 0.4 K/UL    ABS. BASOPHILS 0.0 0.0 - 0.1 K/UL    ABS. IMM.  GRANS. 0.0 0.00 - 0.04 K/UL    DF AUTOMATED     METABOLIC PANEL, COMPREHENSIVE    Collection Time: 11/11/22  7:23 PM   Result Value Ref Range    Sodium 141 136 - 145 mmol/L    Potassium 4.0 3.5 - 5.1 mmol/L    Chloride 106 97 - 108 mmol/L    CO2 30 21 - 32 mmol/L    Anion gap 5 5 - 15 mmol/L    Glucose 64 (L) 65 - 100 mg/dL    BUN 22 (H) 6 - 20 MG/DL    Creatinine 0.94 0.55 - 1.02 MG/DL    BUN/Creatinine ratio 23 (H) 12 - 20      eGFR >60 >60 ml/min/1.73m2    Calcium 9.3 8.5 - 10.1 MG/DL    Bilirubin, total 0.2 0.2 - 1.0 MG/DL    ALT (SGPT) 23 12 - 78 U/L    AST (SGOT) 25 15 - 37 U/L    Alk. phosphatase 83 45 - 117 U/L    Protein, total 7.4 6.4 - 8.2 g/dL    Albumin 3.6 3.5 - 5.0 g/dL    Globulin 3.8 2.0 - 4.0 g/dL    A-G Ratio 0.9 (L) 1.1 - 2.2     TROPONIN-HIGH SENSITIVITY    Collection Time: 11/11/22  7:23 PM   Result Value Ref Range    Troponin-High Sensitivity 10 0 - 51 ng/L   NT-PRO BNP    Collection Time: 11/11/22  7:23 PM   Result Value Ref Range    NT pro-BNP 79 <125 PG/ML       Radiologic Studies  CT HEAD WO CONT   Final Result   No acute intracranial abnormality. CT Results  (Last 48 hours)                 11/11/22 2247  CT HEAD WO CONT Final result    Impression:  No acute intracranial abnormality. Narrative:  EXAM: CT HEAD WO CONT       INDICATION: intermittent confusion, hypertensive emergency, likely PRESS       COMPARISON: None. CONTRAST: None. TECHNIQUE: Unenhanced CT of the head was performed using 5 mm images. Brain and   bone windows were generated. Coronal and sagittal reformats. CT dose reduction   was achieved through use of a standardized protocol tailored for this   examination and automatic exposure control for dose modulation. FINDINGS:   The ventricles and sulci are normal in size, shape and configuration. . There is   no significant white matter disease. There is no intracranial hemorrhage,   extra-axial collection, or mass effect. The basilar cisterns are open. No CT   evidence of acute infarct. The bone windows demonstrate no abnormalities. The visualized portions of the   paranasal sinuses and mastoid air cells are clear.                  CXR Results  (Last 48 hours)      None            Billable Procedures   EKG reviewed by ED Physician in the absence of a cardiologist: Yes  EKG below was interpreted by Vilma Thomas MD    Procedures    Medical Decision Making     I reviewed the patient's most recent Emergency Dept notes and diagnostic tests in formulating my MDM on today's visit. Provider Notes (Medical Decision Making):   19-year-old female with a few weeks of intermittent moderate severity episodes of confusion, often with transient loss of memory. Unclear etiology. Normal neurologic examination. No signs of delirium or confusion at the time of my H&P. She was noted to be markedly hypertensive, systolic blood pressures in the 250+ range at times. Administered hydralazine and amlodipine, which she used to take but is not currently taking. Her blood pressure improved to 190s/70s. She strongly did not wish to be hospitalized for her elevated blood pressure, especially as she was asymptomatic in the emergency department. I obtained a CT scan which was normal.  There are no signs of a mass, PRESS syndrome, or abnormal ventricles. Would consider this hypertensive urgency and she could potentially be having episodes of hypertensive encephalopathy. I am adding on amlodipine and she will continue to take her irbesartan and HCTZ that she is already taking. Advised to follow-up closely with her primary care physician. Yudith Randle MD  4:39 PM  11/11/2022       Social History     Tobacco Use    Smoking status: Never    Smokeless tobacco: Never   Vaping Use    Vaping Use: Never used   Substance Use Topics    Alcohol use: Yes     Alcohol/week: 4.2 standard drinks     Types: 5 Glasses of wine per week     Comment: 1 glass per night    Drug use: No       Medications Administered during ED course:  Medications   hydrALAZINE (APRESOLINE) 20 mg/mL injection 20 mg (20 mg IntraVENous Given 11/11/22 2228)   amLODIPine (NORVASC) tablet 10 mg (10 mg Oral Given 11/11/22 2229)          Prescriptions from today's ED visit:  Discharge Medication List as of 11/12/2022 12:06 AM        START taking these medications    Details   amLODIPine (NORVASC) 10 mg tablet Take 1 Tablet by mouth daily. , Normal, Disp-30 Tablet, R-1           CONTINUE these medications which have NOT CHANGED    Details   irbesartan (AVAPRO) 150 mg tablet Take 1 Tablet by mouth nightly., Normal, Disp-90 Tablet, R-1      hydroCHLOROthiazide (HYDRODIURIL) 12.5 mg tablet TAKE 1 TABLET BY MOUTH EVERY DAY, Normal, Disp-90 Tablet, R-2      metoprolol succinate (TOPROL-XL) 25 mg XL tablet Take 1 Tablet by mouth daily. , Normal, Disp-90 Tablet, R-2      furosemide (LASIX) 20 mg tablet Take 1 Tablet by mouth daily. , Normal, Disp-90 Tablet, R-0      meloxicam (MOBIC) 15 mg tablet TAKE 1 TABLET BY MOUTH EVERY DAY AS NEEDED FOR PAIN, Normal, Disp-30 Tablet, R-0      magnesium 250 mg tab Take 1 Tablet by mouth nightly., Historical Med      acetaminophen (TYLENOL) 650 mg TbER Take 1,300 mg by mouth daily. , Historical Med      YUVAFEM 10 mcg tab vaginal tablet Insert 10 mcg into vagina two (2) times a week., Historical Med, KAJAL      Comp. Stocking,Knee,Regular,Lrg misc 20-30mmHG, Print, Disp-1 Units, R-2            Diagnosis and Disposition     Disposition:  Discharged    Clinical Impression:   1. Hypertensive urgency        Attestation:  I personally performed the services described in this documentation on this date 11/11/2022 for patient Dominic Swain. Jose Elias Scott MD        I was the first provider for this patient on this visit. To the best of my ability I reviewed relevant prior medical records, electrocardiograms, laboratories, and radiologic studies. The patient's presenting problems were discussed, and the patient was in agreement with the care plan formulated and outlined with them. Jose Elias Scott MD    Please note that this dictation was completed with Dragon voice recognition software. Quite often unanticipated grammatical, syntax, homophones, and other interpretive errors are inadvertently transcribed by the computer software. Please disregard these errors and excuse any errors that have escaped final proofreading.

## 2022-11-12 NOTE — ED NOTES
Patient from home; patient's visitor states that she has been diaphoretic and confused: patient reports being delusional and has been having intermittent confusion. Patient is concerned that these symptoms are related to recent medication changes. Patient began taking metoprolol in October and states that these symptoms have occurred since she began taking it; states that in some occurrences she has \"fallen into a deep sleep\" after onset of confusion. Patient was concerned she was taking too medications and saw MD Thursday. Patient is noted to by hypertensive in triage.

## 2022-11-12 NOTE — ED NOTES
Juliocesar Javed MD reviewed discharge instructions with the patient. The patient verbalized understanding. All questions and concerns were addressed. The patient is discharged ambulatory with instructions and prescriptions in hand. Pt is alert and oriented x 4. Respirations are clear and unlabored.

## 2022-11-30 NOTE — PROGRESS NOTES
[facility]  Metabolic Weight Loss Program        Patient's Name: Rony De Souza  : 1955    This patient is enrolled in [address] [place] [address] Weight Loss Program and attended the required weekly virtual nutrition class hosted via [employer].       Kanu Gibson, MS, RD, LDN

## 2022-12-01 ENCOUNTER — ANCILLARY PROCEDURE (OUTPATIENT)
Dept: CARDIOLOGY CLINIC | Age: 67
End: 2022-12-01
Payer: MEDICARE

## 2022-12-01 VITALS
SYSTOLIC BLOOD PRESSURE: 150 MMHG | HEIGHT: 60 IN | WEIGHT: 222 LBS | DIASTOLIC BLOOD PRESSURE: 88 MMHG | BODY MASS INDEX: 43.59 KG/M2

## 2022-12-01 DIAGNOSIS — R55 SYNCOPE, UNSPECIFIED SYNCOPE TYPE: ICD-10-CM

## 2022-12-01 DIAGNOSIS — I10 ESSENTIAL HYPERTENSION: ICD-10-CM

## 2022-12-01 RX ORDER — TETRAKIS(2-METHOXYISOBUTYLISOCYANIDE)COPPER(I) TETRAFLUOROBORATE 1 MG/ML
30 INJECTION, POWDER, LYOPHILIZED, FOR SOLUTION INTRAVENOUS ONCE
Status: COMPLETED | OUTPATIENT
Start: 2022-12-01 | End: 2022-12-01

## 2022-12-01 RX ORDER — TETRAKIS(2-METHOXYISOBUTYLISOCYANIDE)COPPER(I) TETRAFLUOROBORATE 1 MG/ML
10 INJECTION, POWDER, LYOPHILIZED, FOR SOLUTION INTRAVENOUS ONCE
Status: COMPLETED | OUTPATIENT
Start: 2022-12-01 | End: 2022-12-01

## 2022-12-01 RX ADMIN — TETRAKIS(2-METHOXYISOBUTYLISOCYANIDE)COPPER(I) TETRAFLUOROBORATE 24.1 MILLICURIE: 1 INJECTION, POWDER, LYOPHILIZED, FOR SOLUTION INTRAVENOUS at 10:10

## 2022-12-01 RX ADMIN — TETRAKIS(2-METHOXYISOBUTYLISOCYANIDE)COPPER(I) TETRAFLUOROBORATE 7.5 MILLICURIE: 1 INJECTION, POWDER, LYOPHILIZED, FOR SOLUTION INTRAVENOUS at 09:10

## 2022-12-01 NOTE — PROGRESS NOTES
[facility]  Metabolic Weight Loss Program        Patient's Name: Joseph Cobian  : 1955    This patient is enrolled in [address] [place] [address] Weight Loss Program and attended the required weekly virtual nutrition class hosted via [employer].       Jackie Bo, MS, RD, LDN

## 2022-12-02 LAB
NUC STRESS EJECTION FRACTION: 71 %
STRESS BASELINE DIAS BP: 88 MMHG
STRESS BASELINE HR: 62 BPM
STRESS BASELINE ST DEPRESSION: 0 MM
STRESS BASELINE SYS BP: 150 MMHG
STRESS O2 SAT PEAK: 97 %
STRESS O2 SAT REST: 100 %
STRESS PEAK DIAS BP: 80 MMHG
STRESS PEAK SYS BP: 170 MMHG
STRESS PERCENT HR ACHIEVED: 62 %
STRESS POST PEAK HR: 94 BPM
STRESS RATE PRESSURE PRODUCT: NORMAL BPM*MMHG
STRESS TARGET HR: 152 BPM

## 2022-12-06 ENCOUNTER — OFFICE VISIT (OUTPATIENT)
Dept: CARDIOLOGY CLINIC | Age: 67
End: 2022-12-06
Payer: MEDICARE

## 2022-12-06 VITALS
DIASTOLIC BLOOD PRESSURE: 80 MMHG | HEIGHT: 60 IN | SYSTOLIC BLOOD PRESSURE: 150 MMHG | RESPIRATION RATE: 15 BRPM | BODY MASS INDEX: 43.59 KG/M2 | WEIGHT: 222 LBS | OXYGEN SATURATION: 98 % | HEART RATE: 64 BPM

## 2022-12-06 DIAGNOSIS — I10 ESSENTIAL HYPERTENSION: Primary | ICD-10-CM

## 2022-12-06 DIAGNOSIS — I42.8 NICM (NONISCHEMIC CARDIOMYOPATHY) (HCC): ICD-10-CM

## 2022-12-06 DIAGNOSIS — I10 ESSENTIAL HYPERTENSION: ICD-10-CM

## 2022-12-06 RX ORDER — AMLODIPINE BESYLATE 5 MG/1
7.5 TABLET ORAL DAILY
Qty: 135 TABLET | Refills: 3 | Status: SHIPPED | OUTPATIENT
Start: 2022-12-06 | End: 2022-12-09 | Stop reason: ALTCHOICE

## 2022-12-06 NOTE — PATIENT INSTRUCTIONS
Please increase Norvasc to 7.5mg in the AM.    Have labs completed. Call [phone] to schedule your renal ultrasound. We will see you back in about 4 weeks with a limited echo and office visit.

## 2022-12-06 NOTE — PROGRESS NOTES
[facility]                                                            OFFICE NOTE        Kimberly Le M.D.,[address]   1955  [id number]    Date/Time:  12/6/20228:28 AM            SUBJECTIVE:  She is quite upset about her blood pressure apparently she has significant spikes between 2 and 6 PM.    She was recently in the hospital for hypertensive urgency at which time she was started on amlodipine. She is taking amlodipine as a reaction nonetheless to increase blood pressure around 2 PM.       Assessment/Plan    1. Cardiomyopathy: Last echocardiogram in September thousand 22 with ejection fraction of 45 to 50%. Clinically she seems compensated at this time. Now status post nuclear stress test with no clear evidence for ischemia and an estimated normal ejection fraction at 71%. This was discussed with the patient. Nonetheless is aware limitation of nuclear stress testing evaluated ejection fraction. For now continue Toprol and Avapro. 2.  Hypertension: She remains hypertensive. We had a very long conversation regarding her hypertension on the \"spikes\". It is possible that this fluctuation in blood pressure related to medication time. Continue Avapro same dose in the morning. She does not want to increase the Avapro 300. Increase amlodipine to 7.5 mg and start taking it breakfast and not the lunchtime. She is already aware of nutrition and weight loss. Also aware exercise and salt avoidance if possible. Continue Toprol and hydrochlorothiazide at night hydrochlorothiazide was started recently by her primary care physician. 3.  Hyperlipidemia: Closely followed by her primary care physician. 4.  Obstructive sleep apnea: On CPAP. 5.  PSVT: No recurrence thus far continue Toprol Short episodes were noted on the Holter monitor.     Proceed with TSH again PTH BMP and plasma catecholamines on account of this \"spikes\" of her blood pressure. If blood pressure not well controlled proceed with more evaluation for coronary disease the patient is aware of potential false negative stress test.    Obtaining renal vascular ultrasound as well. Otherwise see her back after above-mentioned will be completed at the same day we will proceed with limited echocardiogram for ejection fraction only. Also encouraged her to seek second opinion  which could be extremely helpful as well. HPI     Very pleasant 44-year-old female with past medical history most remarkable for hypertension and sleep apnea on CPAP who presents today for cardiac evaluation of the abnormal echocardiogram.    To be noted the patient experience COVID in August 2022. She has some issues with dyspnea at that time. She tells me she feels better at this point. Past medical past medical history: As above. Past Surgical History:   Procedure Laterality Date    HX APPENDECTOMY        HX HYSTERECTOMY   approx 1987     2/2 uterine fibroids    HX OTHER SURGICAL   2002     hiatal hernia repair       Social history: She does not smoke she drinks occasionally. She is retired from [employer]. Family history: On significant coronary events. CARDIAC STUDIES        09/15/22    ECHO ADULT COMPLETE 09/15/2022 9/15/2022    Interpretation Summary    Left Ventricle: Unable to assess left ventricular systolic function with a visually estimated EF of 45 - 50%. Left ventricle size is normal. Normal wall thickness. Findings consistent with mild eccentric hypertrophy. Unable to assess wall motion. Right Ventricle: Not assessed due to poor image quality. Mitral Valve: Mild regurgitation. Technical qualifiers: Echo study was technically difficult, technically difficult due to patient's body habitus and a technically difficult Doppler study.     Contrast used: Definity. Signed by: Bahman Ramos MD on 9/15/2022  5:48 PM            12/01/22    NUCLEAR CARDIAC STRESS TEST 12/02/2022 12/2/2022    Interpretation Summary    Nuclear Findings: There is a mild severity left ventricular stress perfusion defect that is small to medium in size present in the mid to distal anterior segment(s) that is predominantly fixed. There is normal wall motion in the defect area. The defect appears to be probable artifact caused by breast attenuation. Nuclear Findings: Normal left ventricular systolic function post-stress. Nuclear Findings: Normal pharmacological myocardial perfusion study. ECG: Resting ECG demonstrates normal sinus rhythm. ECG: Stress ECG was negative for ischemia. Stress Test: A pharmacological stress test was performed using lexiscan. Hemodynamics are adequate for diagnosis. Blood pressure demonstrated a normal response and heart rate demonstrated a normal response to stress. The patient's heart rate recovery was normal.    Post-stress ejection fraction is 71%. Stress Combined Conclusion: Normal pharmacological myocardial perfusion study. Signed by: Javon Goncalves MD on 12/2/2022  3:05 PM                    EKG Results       None                IMAGING      MRI Results (most recent):  Results from [place] encounter on 03/31/16    MRI SHOULDER RT WO CONT    Narrative  **Final Report**      ICD Codes / Adm. Diagnosis: 715.91  726.2 / Osteoarthrosis, unspecified wh  Other affections of shoulder  Examination:  MR SHOULDER WO CON RT  - [id number] - Mar 31 2016  7:49AM  Accession No:  [id number]  Reason:  degenerative joint disease of rt shoulder      REPORT:  EXAM:  MR SHOULDER WO CON RT    INDICATION: Right shoulder pain.  No injury    COMPARISON: None    TECHNIQUE: Axial proton density fat-saturated; oblique coronal T1, T2  fat-saturated, and proton density fat-saturated; and oblique sagittal T2  fat-saturated MRI of the right shoulder. CONTRAST: None. FINDINGS: A.C. joint: There is moderate degeneration of the  acromioclavicular joint. Minimal subacromial spurring Anterior acromion  process type: 2    Bone marrow: There is irregularity of the marrow of the inferior medial  humeral head however this is interspersed with fat of uncertain but doubtful  clinical significance. Joint fluid: There is trace fluid subdeltoid bursa    Rotator cuff tendons: There is tendinopathy of the supraspinatus and  anterior infraspinatus with diffuse shallow partial-thickness undersurface  tearing. Question small high-grade partial-thickness interstitial versus  undersurface tear posterior supraspinatus insertion. No full-thickness  component to the tear. Subscapularis and teres minor are intact    Biceps tendon: Intact and located within the bicipital groove. There is an 8  mm loose body within the biceps long head tendon sheath    Muscles: There is minimal atrophy of the infraspinatus    Glenoid labrum: Intact. Joint capsule: within normal limits. Glenohumeral articular cartilage: Intact. No focal osteochondral lesion. Soft tissue mass: None. Impression  :  1. Tendinopathy of the supraspinatus and anterior infraspinatus with diffuse  shallow partial-thickness undersurface tearing. Question small high-grade  partial-thickness undersurface/interstitial tear of the posterior  supraspinatus. No full-thickness tear  2. 8 mm loose body in the biceps long head tendon sheath. A donor site is  not identified          Signing/Reading Doctor: Betsey Niño ([id number])  Approved: Betsey Niño ([id number])  Mar 31 2016  8:11AM      CT Results (most recent):  Results from [place] encounter on 11/11/22    CT HEAD WO CONT    Narrative  EXAM: CT HEAD WO CONT    INDICATION: intermittent confusion, hypertensive emergency, likely PRESS    COMPARISON: None. CONTRAST: None.     TECHNIQUE: Unenhanced CT of the head was performed using 5 mm images. Brain and  bone windows were generated. Coronal and sagittal reformats. CT dose reduction  was achieved through use of a standardized protocol tailored for this  examination and automatic exposure control for dose modulation. FINDINGS:  The ventricles and sulci are normal in size, shape and configuration. . There is  no significant white matter disease. There is no intracranial hemorrhage,  extra-axial collection, or mass effect. The basilar cisterns are open. No CT  evidence of acute infarct. The bone windows demonstrate no abnormalities. The visualized portions of the  paranasal sinuses and mastoid air cells are clear. Impression  No acute intracranial abnormality. XR Results (most recent):  Results from Hospital Encounter encounter on 08/25/22    XR CHEST PA LAT    Narrative  Indication:  Wheezing, syncope. Exam: PA and lateral views of the chest.    Direct comparison is made to prior CXR dated January 2013. Findings: Cardiomediastinal silhouette is within normal limits. Lungs are clear  bilaterally. Pleural spaces are normal. Osseous structures are intact. Impression  No acute cardiopulmonary disease. Past Medical History:   Diagnosis Date    HTN (hypertension) 1/15/2010    Sleep apnea 1/20/2015    Sleep apnea, obstructive 1/5/2016     Past Surgical History:   Procedure Laterality Date    HX APPENDECTOMY      HX HYSTERECTOMY  approx 1987 2/2 uterine fibroids    HX OTHER SURGICAL  2002    hiatal hernia repair      Social History     Tobacco Use    Smoking status: Never    Smokeless tobacco: Never   Vaping Use    Vaping Use: Never used   Substance Use Topics    Alcohol use:  Yes     Alcohol/week: 4.2 standard drinks     Types: 5 Glasses of wine per week     Comment: 1 glass per night    Drug use: No     Family History   Problem Relation Age of Onset    Cancer Mother         oral    Hypertension Father     Prostate Cancer Father     Hypertension Sister     Hypertension Brother      No Known Allergies      There were no vitals taken for this visit. There were no vitals filed for this visit. Review of Systems:   Pertinent items are noted in the History of Present Illness. Visit Vitals  BP (!) 160/90 (BP 1 Location: Right arm, BP Patient Position: Sitting, BP Cuff Size: Adult)   Pulse 64   Resp 15   Ht 5' (1.524 m)   Wt 222 lb (100.7 kg)   SpO2 98%   BMI 43.36 kg/m²     General Appearance:  Well developed, well nourished,alert and oriented x 3, and individual in no acute distress. Ears/Nose/Mouth/Throat:   Hearing grossly normal.         Neck: Supple. Chest:   Lungs clear to auscultation bilaterally. Cardiovascular:  Regular rate and rhythm, S1, S2 normal, no murmur. Abdomen:   Soft, non-tender, bowel sounds are active. Extremities: No edema bilaterally. Skin: Warm and dry. Current Outpatient Medications on File Prior to Visit   Medication Sig Dispense Refill    amLODIPine (NORVASC) 10 mg tablet Take 1 Tablet by mouth daily. 30 Tablet 1    irbesartan (AVAPRO) 150 mg tablet Take 1 Tablet by mouth nightly. 90 Tablet 1    hydroCHLOROthiazide (HYDRODIURIL) 12.5 mg tablet TAKE 1 TABLET BY MOUTH EVERY DAY 90 Tablet 2    metoprolol succinate (TOPROL-XL) 25 mg XL tablet Take 1 Tablet by mouth daily. 90 Tablet 2    furosemide (LASIX) 20 mg tablet Take 1 Tablet by mouth daily. (Patient not taking: Reported on 11/10/2022) 90 Tablet 0    meloxicam (MOBIC) 15 mg tablet TAKE 1 TABLET BY MOUTH EVERY DAY AS NEEDED FOR PAIN 30 Tablet 0    magnesium 250 mg tab Take 1 Tablet by mouth nightly. acetaminophen (TYLENOL) 650 mg TbER Take 1,300 mg by mouth daily. YUVAFEM 10 mcg tab vaginal tablet Insert 10 mcg into vagina two (2) times a week. Comp. Stocking,Knee,Regular,Lrg misc 20-30mmHG 1 Units 2     No current facility-administered medications on file prior to visit. Jennifer Hawk had no medications administered during this visit. Current Outpatient Medications   Medication Sig    amLODIPine (NORVASC) 10 mg tablet Take 1 Tablet by mouth daily. irbesartan (AVAPRO) 150 mg tablet Take 1 Tablet by mouth nightly. hydroCHLOROthiazide (HYDRODIURIL) 12.5 mg tablet TAKE 1 TABLET BY MOUTH EVERY DAY    metoprolol succinate (TOPROL-XL) 25 mg XL tablet Take 1 Tablet by mouth daily. furosemide (LASIX) 20 mg tablet Take 1 Tablet by mouth daily. (Patient not taking: Reported on 11/10/2022)    meloxicam (MOBIC) 15 mg tablet TAKE 1 TABLET BY MOUTH EVERY DAY AS NEEDED FOR PAIN    magnesium 250 mg tab Take 1 Tablet by mouth nightly. acetaminophen (TYLENOL) 650 mg TbER Take 1,300 mg by mouth daily. YUVAFEM 10 mcg tab vaginal tablet Insert 10 mcg into vagina two (2) times a week. Comp. Stocking,Knee,Regular,Lrg misc 20-30mmHG     No current facility-administered medications for this visit. Lab Results   Component Value Date/Time    Cholesterol, total 221 (H) 05/11/2022 09:23 AM    HDL Cholesterol 75 05/11/2022 09:23 AM    LDL, calculated 130 (H) 05/11/2022 09:23 AM    LDL, calculated 110 (H) 09/03/2021 08:50 AM    VLDL, calculated 16 05/11/2022 09:23 AM    VLDL, calculated 14 09/03/2021 08:50 AM    Triglyceride 93 05/11/2022 09:23 AM    CHOL/HDL Ratio 2.4 09/03/2021 08:50 AM       Lab Results   Component Value Date/Time    Sodium 141 11/11/2022 07:23 PM    Potassium 4.0 11/11/2022 07:23 PM    Chloride 106 11/11/2022 07:23 PM    CO2 30 11/11/2022 07:23 PM    Anion gap 5 11/11/2022 07:23 PM    Glucose 64 (L) 11/11/2022 07:23 PM    BUN 22 (H) 11/11/2022 07:23 PM    Creatinine 0.94 11/11/2022 07:23 PM    BUN/Creatinine ratio 23 (H) 11/11/2022 07:23 PM    GFR est AA >60 08/25/2022 01:01 PM    GFR est non-AA 58 (L) 08/25/2022 01:01 PM    Calcium 9.3 11/11/2022 07:23 PM       Lab Results   Component Value Date/Time    ALT (SGPT) 23 11/11/2022 07:23 PM    Alk.  phosphatase 83 11/11/2022 07:23 PM    Bilirubin, total 0.2 11/11/2022 07:23 PM Lab Results   Component Value Date/Time    WBC 9.0 11/11/2022 07:23 PM    HGB 13.6 11/11/2022 07:23 PM    HCT 42.8 11/11/2022 07:23 PM    PLATELET 039 89/76/4352 07:23 PM    MCV 91.1 11/11/2022 07:23 PM       Lab Results   Component Value Date/Time    TSH 1.80 11/10/2022 03:20 PM         Lab Results   Component Value Date/Time    Cholesterol, total 221 (H) 05/11/2022 09:23 AM    Cholesterol, total 210 (H) 09/03/2021 08:50 AM    Cholesterol, total 230 (H) 07/02/2020 11:22 AM    Cholesterol, total 215 (H) 03/26/2018 10:59 AM    Cholesterol, total 220 (H) 01/31/2017 11:33 AM    HDL Cholesterol 75 05/11/2022 09:23 AM    HDL Cholesterol 86 09/03/2021 08:50 AM    HDL Cholesterol 64 07/02/2020 11:22 AM    HDL Cholesterol 56 03/26/2018 10:59 AM    HDL Cholesterol 59 01/31/2017 11:33 AM    LDL, calculated 130 (H) 05/11/2022 09:23 AM    LDL, calculated 110 (H) 09/03/2021 08:50 AM    LDL, calculated 145 (H) 07/02/2020 11:22 AM    LDL, calculated 133 (H) 03/26/2018 10:59 AM    LDL, calculated 133 (H) 01/31/2017 11:33 AM    LDL, calculated 135 (H) 06/05/2015 08:38 AM    Triglyceride 93 05/11/2022 09:23 AM    Triglyceride 70 09/03/2021 08:50 AM    Triglyceride 107 07/02/2020 11:22 AM    Triglyceride 129 03/26/2018 10:59 AM    Triglyceride 142 01/31/2017 11:33 AM    CHOL/HDL Ratio 2.4 09/03/2021 08:50 AM    CHOL/HDL Ratio 4.0 09/08/2010 09:44 AM    CHOL/HDL Ratio 3.6 02/19/2010 10:41 AM    CHOL/HDL Ratio 3.4 02/16/2009 09:30 AM                Please note that this dictation was completed with [employer], the [employer] voice recognition software. Quite often unanticipated grammatical, syntax, homophones, and other interpretative errors are inadvertently transcribed by the computer software. Please disregard these errors. Please excuse any errors that have escaped final proofreading.

## 2022-12-06 NOTE — PROGRESS NOTES
Visit Vitals  BP (!) 160/90 (BP 1 Location: Right arm, BP Patient Position: Sitting, BP Cuff Size: Adult)   Pulse 64   Resp 15   Ht 5' (1.524 m)   Wt 222 lb (100.7 kg)   SpO2 98%   BMI 43.36 kg/m²

## 2022-12-07 ENCOUNTER — TELEPHONE (OUTPATIENT)
Dept: CARDIOLOGY CLINIC | Age: 67
End: 2022-12-07

## 2022-12-07 LAB
ALBUMIN SERPL-MCNC: 3.8 G/DL (ref 3.5–5)
ALBUMIN/GLOB SERPL: 1.2 {RATIO} (ref 1.1–2.2)
ALP SERPL-CCNC: 99 U/L (ref 45–117)
ALT SERPL-CCNC: 23 U/L (ref 12–78)
ANION GAP SERPL CALC-SCNC: 6 MMOL/L (ref 5–15)
AST SERPL-CCNC: 23 U/L (ref 15–37)
BILIRUB SERPL-MCNC: 0.3 MG/DL (ref 0.2–1)
BUN SERPL-MCNC: 19 MG/DL (ref 6–20)
BUN/CREAT SERPL: 23 (ref 12–20)
CALCIUM SERPL-MCNC: 9.4 MG/DL (ref 8.5–10.1)
CALCIUM SERPL-MCNC: 9.5 MG/DL (ref 8.5–10.1)
CHLORIDE SERPL-SCNC: 105 MMOL/L (ref 97–108)
CO2 SERPL-SCNC: 29 MMOL/L (ref 21–32)
CREAT SERPL-MCNC: 0.84 MG/DL (ref 0.55–1.02)
GLOBULIN SER CALC-MCNC: 3.3 G/DL (ref 2–4)
GLUCOSE SERPL-MCNC: 34 MG/DL (ref 65–100)
POTASSIUM SERPL-SCNC: 3.9 MMOL/L (ref 3.5–5.1)
PROT SERPL-MCNC: 7.1 G/DL (ref 6.4–8.2)
PTH-INTACT SERPL-MCNC: 67 PG/ML (ref 18.4–88)
SODIUM SERPL-SCNC: 140 MMOL/L (ref 136–145)
TSH SERPL DL<=0.05 MIU/L-ACNC: 1.46 UIU/ML (ref 0.36–3.74)

## 2022-12-07 NOTE — TELEPHONE ENCOUNTER
[employer] lab called with critical lab value of 34. Called pt to check on her. Verified patient's identity with two identifiers. Pt stated Dr. Yahir Maloney already called her this morning, went over all results including glucose, asked if she ate yesterday, how she is feeling, etc. Patient feels fine and felt fine earlier. She denied further questions or concerns.

## 2022-12-07 NOTE — PROGRESS NOTES
Discussed with patient results of labs from yesterday although not yet completed. The first portion of the labs the BMP 6 liver function test are available also TSH are available. TSH is normal.  The potassium and creatinine are normal.  Liver function test are within normal limits. PTH and calcium appear to be normal.    The patient tells me this morning that she does not have 5 mg tablet of amlodipine. She does have a 10 mg tablet we have discussed potential options. She was supposed to start 7.5 mg daily but she is agreeable to initiating 10 mg amlodipine daily understanding the potential for side effects. Mostly abnormal lab value was glucose which was very low at 34.   The patient has been asymptomatic from the standpoint of asked her to follow-up with her primary care physician for her hypoglycemia    She will continue with plan as outlined yesterday last

## 2022-12-09 ENCOUNTER — OFFICE VISIT (OUTPATIENT)
Dept: INTERNAL MEDICINE CLINIC | Age: 67
End: 2022-12-09
Payer: MEDICARE

## 2022-12-09 VITALS
OXYGEN SATURATION: 99 % | TEMPERATURE: 97.8 F | HEIGHT: 60 IN | SYSTOLIC BLOOD PRESSURE: 138 MMHG | RESPIRATION RATE: 18 BRPM | HEART RATE: 71 BPM | WEIGHT: 225 LBS | DIASTOLIC BLOOD PRESSURE: 80 MMHG | BODY MASS INDEX: 44.17 KG/M2

## 2022-12-09 DIAGNOSIS — I50.22 CHRONIC SYSTOLIC CONGESTIVE HEART FAILURE (HCC): ICD-10-CM

## 2022-12-09 DIAGNOSIS — Z00.00 MEDICARE ANNUAL WELLNESS VISIT, SUBSEQUENT: ICD-10-CM

## 2022-12-09 DIAGNOSIS — E16.2 HYPOGLYCEMIA: Primary | ICD-10-CM

## 2022-12-09 DIAGNOSIS — R55 SYNCOPE, UNSPECIFIED SYNCOPE TYPE: ICD-10-CM

## 2022-12-09 DIAGNOSIS — I10 ESSENTIAL HYPERTENSION: ICD-10-CM

## 2022-12-09 LAB
DOPAMINE SERPL-MCNC: 35 PG/ML (ref 0–48)
EPINEPH PLAS-MCNC: 173 PG/ML (ref 0–62)
GLUCOSE POC: 125 MG/DL
HGB BLD-MCNC: 5.1 G/DL
NOREPINEPH PLAS-MCNC: 667 PG/ML (ref 0–874)

## 2022-12-09 RX ORDER — METOPROLOL SUCCINATE 25 MG/1
25 TABLET, EXTENDED RELEASE ORAL DAILY
Qty: 90 TABLET | Refills: 2 | Status: SHIPPED | OUTPATIENT
Start: 2022-12-09

## 2022-12-09 RX ORDER — IRBESARTAN 150 MG/1
150 TABLET ORAL
Qty: 90 TABLET | Refills: 2 | Status: SHIPPED | OUTPATIENT
Start: 2022-12-09

## 2022-12-09 RX ORDER — AMLODIPINE BESYLATE 10 MG/1
10 TABLET ORAL DAILY
Qty: 90 TABLET | Refills: 2 | Status: SHIPPED | OUTPATIENT
Start: 2022-12-09

## 2022-12-09 RX ORDER — INSULIN PUMP SYRINGE, 3 ML
EACH MISCELLANEOUS
Qty: 1 KIT | Refills: 0 | Status: SHIPPED | OUTPATIENT
Start: 2022-12-09

## 2022-12-09 NOTE — PROGRESS NOTES
Ms. Praveen Norwood is presenting to follow up     CC:  Hypertension  hypoglycemia  HPI:    She is a 75 y.o. female who presents for evaluation of HTN, heart failur    She has a long history of hypertension and her blood pressure has been higher than goal for some time. She is currently on Avapro 300 and hydrochlorothiazide 12.5 mg . She complained of an episode of possibly having loss of consciousness therefore I did a work-up including labs carotid ultrasound and a heart echo   I reviewed with her that she has ejection fraction of 45 to 50% her previous echo her EF was 65. I referred her to cards     She saw cards Dr Joanna Valerio on 10/07      Cardiomyopathy: She does seem to have mild cardiomyopathy with ejection fraction of 45 to 50%. Etiology of this is unclear. Seemingly relatively asymptomatic at this time. Continue same dose of Avapro and hydrochlorothiazide. Start Toprol-XL 25 mg nightly. She will take Avapro in the morning.     Negative nuclear test for ischemia    She had side effects of dizziness and possibly syncope and had stopped meds and her BP became as high as 260/130 on 11/10/2022  She resumed meds and is currently on amlodipine 10mg  ( titrated up by cards Dr Joanna Valerio) and avapro 150mg and metoprolol 25mg XL and BP is much better today 138/80 which is consistent with home monitoring    Carotid US was normal in the fall     She is seen nutritionist on 11/22 weight loss program and exercising frequently and eating healthy     Today she has no neurological complaints and no chest pain but her blood pressure is better      She saw Dr Joanna Valerio and glucose on labs was 33 asymptomatic at the time  She does not have a hx of diabetes  Today blood glucose is 125 and HA1C is 5     [facility]   Review of systems:  Constitutional: negative for fever, chills, weight loss, night sweats   10 systems reviewed and negative other then HPI       Past Medical History: Diagnosis Date    HTN (hypertension) 1/15/2010    Sleep apnea 1/20/2015    Sleep apnea, obstructive 1/5/2016        Past Surgical History:   Procedure Laterality Date    HX APPENDECTOMY      HX HYSTERECTOMY  approx 1987    2/2 uterine fibroids    HX OTHER SURGICAL  2002    hiatal hernia repair        No Known Allergies    Current Outpatient Medications on File Prior to Visit   Medication Sig Dispense Refill    irbesartan (AVAPRO) 150 mg tablet Take 1 Tablet by mouth nightly. 90 Tablet 1    hydroCHLOROthiazide (HYDRODIURIL) 12.5 mg tablet TAKE 1 TABLET BY MOUTH EVERY DAY 90 Tablet 2    metoprolol succinate (TOPROL-XL) 25 mg XL tablet Take 1 Tablet by mouth daily. 90 Tablet 2    meloxicam (MOBIC) 15 mg tablet TAKE 1 TABLET BY MOUTH EVERY DAY AS NEEDED FOR PAIN 30 Tablet 0    magnesium 250 mg tab Take 1 Tablet by mouth nightly. acetaminophen (TYLENOL) 650 mg TbER Take 1,300 mg by mouth daily. YUVAFEM 10 mcg tab vaginal tablet Insert 10 mcg into vagina two (2) times a week. Comp. Stocking,Knee,Regular,Lrg misc 20-30mmHG 1 Units 2    furosemide (LASIX) 20 mg tablet Take 1 Tablet by mouth daily. (Patient not taking: No sig reported) 90 Tablet 0     No current facility-administered medications on file prior to visit. family history includes Cancer in her mother; Hypertension in her brother, father, and sister; Prostate Cancer in her father. Social History     Socioeconomic History    Marital status: SINGLE     Spouse name: Not on file    Number of children: Not on file    Years of education: Not on file    Highest education level: Not on file   Occupational History    Not on file   Tobacco Use    Smoking status: Never    Smokeless tobacco: Never   Vaping Use    Vaping Use: Never used   Substance and Sexual Activity    Alcohol use:  Yes     Alcohol/week: 4.2 standard drinks     Types: 5 Glasses of wine per week     Comment: 1 glass per night    Drug use: No    Sexual activity: Yes     Partners: Male Birth control/protection: Condom   Other Topics Concern    Not on file   Social History Narrative    Not on file     Social Determinants of Health     Financial Resource Strain: Low Risk     Difficulty of Paying Living Expenses: Not very hard   Food Insecurity: No Food Insecurity    Worried About Running Out of Food in the Last Year: Never true    Ran Out of Food in the Last Year: Never true   Transportation Needs: Not on file   Physical Activity: Not on file   Stress: Not on file   Social Connections: Not on file   Intimate Partner Violence: Not on file   Housing Stability: Not on file       Visit Vitals  BP (!) 162/84 (BP 1 Location: Right arm, BP Patient Position: Sitting, BP Cuff Size: Adult)   Pulse 71   Temp 97.8 °F (36.6 °C)   Resp 18   Ht 5' (1.524 m)   Wt 225 lb (102.1 kg)   SpO2 99%   BMI 43.94 kg/m²     General:  Well appearing female no acute distress  HEENT:   PERRL,normal conjunctiva. External ear and canals normal, TMs normal.  Hearing normal to voice. Nose without edema or discharge, normal septum. Lips, teeth, gums normal.  Oropharynx: no erythema, no exudates, no lesions, normal tongue. Neck:  Supple. Thyroid normal size, nontender, without nodules. No carotid bruit. No masses or lymphadenopathy  Respiratory: no respiratory distress,  no wheezing, no rhonchi, no rales. No chest wall tenderness. Cardiovascular:  RRR, normal S1S2, no murmur. Gastrointestinal: normal bowel sounds, soft, nontender, without masses. No hepatosplenomegaly. Extremities +2 pulses, no edema, normal sensation   Musculoskeletal:  Normal gait. Normal digits and nails. Normal strength and tone, no atrophy, and no abnormal movement. Skin:  No rash, no lesions, no ulcers. Skin warm, normal turgor, without induration or nodules. Neuro:  A and OX4, fluent speech, cranial nerves normal 2-12. Sensation normal to light touch.   DTR symmetrical  Psych:  Normal affect      Lab Results   Component Value Date/Time    WBC 9.0 11/11/2022 07:23 PM    HGB 13.6 11/11/2022 07:23 PM    HCT 42.8 11/11/2022 07:23 PM    PLATELET 034 99/89/0101 07:23 PM    MCV 91.1 11/11/2022 07:23 PM     Lab Results   Component Value Date/Time    Sodium 140 12/06/2022 03:03 PM    Potassium 3.9 12/06/2022 03:03 PM    Chloride 105 12/06/2022 03:03 PM    CO2 29 12/06/2022 03:03 PM    Anion gap 6 12/06/2022 03:03 PM    Glucose 34 (LL) 12/06/2022 03:03 PM    BUN 19 12/06/2022 03:03 PM    Creatinine 0.84 12/06/2022 03:03 PM    BUN/Creatinine ratio 23 (H) 12/06/2022 03:03 PM    GFR est AA >60 08/25/2022 01:01 PM    GFR est non-AA 58 (L) 08/25/2022 01:01 PM    Calcium 9.4 12/06/2022 03:03 PM    Calcium 9.5 12/06/2022 03:03 PM     Lab Results   Component Value Date/Time    Cholesterol, total 221 (H) 05/11/2022 09:23 AM    HDL Cholesterol 75 05/11/2022 09:23 AM    LDL, calculated 130 (H) 05/11/2022 09:23 AM    LDL, calculated 110 (H) 09/03/2021 08:50 AM    VLDL, calculated 16 05/11/2022 09:23 AM    VLDL, calculated 14 09/03/2021 08:50 AM    Triglyceride 93 05/11/2022 09:23 AM    CHOL/HDL Ratio 2.4 09/03/2021 08:50 AM     Lab Results   Component Value Date/Time    TSH 1.46 12/06/2022 03:03 PM     Lab Results   Component Value Date/Time    Hemoglobin A1c 5.4 05/11/2022 09:23 AM     Lab Results   Component Value Date/Time    Vitamin D 25-Hydroxy 32.3 04/14/2022 11:44 AM                   Assessment and Plan:     Hypoglycemia: noted in labs with cards. today BS is 125 and HA1C is 5.1 prescribed glucometer    Eat protein frequently during the day - egg white, chicken breast, fish , snack on nuts ( small amount)  Every time you eat a carb ( such as crackers bread must be accompanied by protein)     If you have symptoms of fatigue, shaky, near passing out check your blood sugar      2. Chronic systolic congestive heart failure ([address]  She saw cards and has  negative stress test     3.  Essential hypertension - BP is finally controlled on avapro 150mg, amlodipine 10mg and metoprolol 25mg XL   Monitor home BP   Jo Erickson MD  This is the Subsequent Medicare Annual Wellness Exam, performed 12 months or more after the Initial AWV or the last Subsequent AWV    I have reviewed the patient's medical history in detail and updated the computerized patient record. Assessment/Plan   Education and counseling provided:  Are appropriate based on today's review and evaluation    1. Hypoglycemia  -     Blood-Glucose Meter monitoring kit; Check blood glucose daily as needed for symptoms of faituge, sweating, passing out, Normal, Disp-1 Kit, R-0  2. Syncope, unspecified syncope type  3. Essential hypertension  -     amLODIPine (NORVASC) 10 mg tablet; Take 1 Tablet by mouth daily. , Normal, Disp-90 Tablet, R-2  -     metoprolol succinate (TOPROL-XL) 25 mg XL tablet; Take 1 Tablet by mouth daily. , Normal, Disp-90 Tablet, R-2  -     irbesartan (AVAPRO) 150 mg tablet; Take 1 Tablet by mouth nightly., Normal, Disp-90 Tablet, R-2  4. Chronic systolic congestive heart failure (HCC)  -     irbesartan (AVAPRO) 150 mg tablet; Take 1 Tablet by mouth nightly., Normal, Disp-90 Tablet, R-2  5.  Medicare annual wellness visit, subsequent       Depression Risk Factor Screening     3 most recent PHQ Screens 12/9/2022   Little interest or pleasure in doing things Not at all   Feeling down, depressed, irritable, or hopeless Not at all   Total Score PHQ 2 0   Trouble falling or staying asleep, or sleeping too much -   Feeling tired or having little energy -   Poor appetite, weight loss, or overeating -   Feeling bad about yourself - or that you are a failure or have let yourself or your family down -   Trouble concentrating on things such as school, work, reading, or watching TV -   Moving or speaking so slowly that other people could have noticed; or the opposite being so fidgety that others notice -   Thoughts of being better off dead, or hurting yourself in some way -   PHQ 9 Score -       Alcohol & Drug Abuse Risk Screen    Do you average more than 1 drink per night or more than 7 drinks a week:  No    On any one occasion in the past three months have you have had more than 3 drinks containing alcohol:  No          Functional Ability and Level of Safety    Hearing: Hearing is good. Activities of Daily Living: The home contains: no safety equipment. Patient does total self care      Ambulation: with no difficulty     Fall Risk:  Fall Risk Assessment, last 12 mths 12/9/2022   Able to walk? Yes   Fall in past 12 months? 0   Do you feel unsteady? 0   Are you worried about falling 0   Is TUG test greater than 12 seconds? -   Is the gait abnormal? -   Number of falls in past 12 months -   Fall with injury?  -      Abuse Screen:  Patient is not abused       Cognitive Screening    Has your family/caregiver stated any concerns about your memory: no     Cognitive Screening: Normal - Verbal Fluency Test    Health Maintenance Due     Health Maintenance Due   Topic Date Due    Pneumococcal 65+ years (1 - PCV) Never done    COVID-19 Vaccine (5 - Booster for Verle Roxanna series) 12/05/2022       Patient Care Team   Patient Care Team:  Lovely Coffey MD as PCP - General (Internal Medicine Physician)  Lovely Coffey MD as PCP - [facility] Empaneled Provider  Brynn Guadalupe MD (Orthopedic Surgery)  Joseluis Atkinson MD ([facility])  Tangela Clark MD     F/U in 3 months

## 2022-12-09 NOTE — PATIENT INSTRUCTIONS
Obtain Pneumonia vaccine    Repeat sugar today is  125    Eat protein frequently during the day - egg white, chicken breast, fish , snack on nuts ( small amount)  Every time you eat a carb ( such as crackers bread must be accompanied by protein)     If you have symptoms of fatigue, shaky, near passing out check your blood sugar    Medicare Wellness Visit, Female     The best way to live healthy is to have a lifestyle where you eat a well-balanced diet, exercise regularly, limit alcohol use, and quit all forms of tobacco/nicotine, if applicable. Regular preventive services are another way to keep healthy. Preventive services (vaccines, screening tests, monitoring & exams) can help personalize your care plan, which helps you manage your own care. Screening tests can find health problems at the earliest stages, when they are easiest to treat. [facility] follows the current, evidence-based guidelines published by the [facility] Mooyd ([facility]) when recommending preventive services for our patients. Because we follow these guidelines, sometimes recommendations change over time as research supports it. (For example, mammograms used to be recommended annually. Even though Medicare will still pay for an annual mammogram, the newer guidelines recommend a mammogram every two years for women of average risk). Of course, you and your doctor may decide to screen more often for some diseases, based on your risk and your co-morbidities (chronic disease you are already diagnosed with). Preventive services for you include:  - Medicare offers their members a free annual wellness visit, which is time for you and your primary care provider to discuss and plan for your preventive service needs.  Take advantage of this benefit every year!    -Over the age of 72 should receive the recommended pneumonia vaccines.    -All adults should have a flu vaccine yearly.  -All adults should have a tetanus vaccine every 10 years.   -Over the age 48 should receive the shingles vaccines.        -All adults should be screened once for Hepatitis C.  -All adults age 38-68 who are overweight should have a diabetes screening test once every three years.   -Other screening tests and preventive services for persons with diabetes include: an eye exam to screen for diabetic retinopathy, a kidney function test, a foot exam, and stricter control over your cholesterol.   -Cardiovascular screening for adults with routine risk involves an electrocardiogram (ECG) at intervals determined by your doctor.     -Colorectal cancer screenings should be done for adults age 39-70 with no increased risk factors for colorectal cancer. There are a number of acceptable methods of screening for this type of cancer. Each test has its own benefits and drawbacks. Discuss with your doctor what is most appropriate for you during your annual wellness visit. The different tests include: colonoscopy (considered the best screening method), a fecal occult blood test, a fecal DNA test, and sigmoidoscopy.    -Lung cancer screening is recommended annually with a low dose CT scan for adults between age 54 and 68, who have smoked at least 30 pack years (equivalent of 1 pack per day for 30 days), and who is a current smoker or quit less than 15 years ago.    -A bone mass density test is recommended when a woman turns 65 to screen for osteoporosis. This test is only recommended one time, as a screening. Some providers will use this same test as a disease monitoring tool if you already have osteoporosis. -Breast cancer screenings are recommended every other year for women of normal risk, age 54-69.    -Cervical cancer screenings for women over age 72 are only recommended with certain risk factors.      Here is a list of your current Health Maintenance items (your personalized list of preventive services) with a due date:  Health Maintenance Due   Topic Date Due    Pneumococcal Vaccine (1 - PCV) Never done    COVID-19 Vaccine (5 - Booster for Moderna series) 12/05/2022

## 2023-01-12 ENCOUNTER — OFFICE VISIT (OUTPATIENT)
Dept: CARDIOLOGY CLINIC | Age: 68
End: 2023-01-12
Payer: MEDICARE

## 2023-01-12 VITALS
SYSTOLIC BLOOD PRESSURE: 138 MMHG | HEIGHT: 60 IN | DIASTOLIC BLOOD PRESSURE: 80 MMHG | BODY MASS INDEX: 45.94 KG/M2 | WEIGHT: 234 LBS

## 2023-01-12 DIAGNOSIS — I42.8 NICM (NONISCHEMIC CARDIOMYOPATHY) (HCC): ICD-10-CM

## 2023-01-12 LAB
ECHO AO ASC DIAM: 3.3 CM
ECHO AO ASCENDING AORTA INDEX: 1.65 CM/M2
ECHO AO ROOT DIAM: 3.3 CM
ECHO AO ROOT INDEX: 1.65 CM/M2
ECHO LA DIAMETER INDEX: 2.15 CM/M2
ECHO LA DIAMETER: 4.3 CM
ECHO LA TO AORTIC ROOT RATIO: 1.3
ECHO LA VOL 2C: 41 ML (ref 22–52)
ECHO LA VOL 4C: 66 ML (ref 22–52)
ECHO LA VOL BP: 53 ML (ref 22–52)
ECHO LA VOL/BSA BIPLANE: 27 ML/M2 (ref 16–34)
ECHO LA VOLUME AREA LENGTH: 56 ML
ECHO LA VOLUME INDEX A2C: 21 ML/M2 (ref 16–34)
ECHO LA VOLUME INDEX A4C: 33 ML/M2 (ref 16–34)
ECHO LA VOLUME INDEX AREA LENGTH: 28 ML/M2 (ref 16–34)
ECHO LV EDV A2C: 100 ML
ECHO LV EDV A4C: 60 ML
ECHO LV EDV BP: 81 ML (ref 56–104)
ECHO LV EDV INDEX A4C: 30 ML/M2
ECHO LV EDV INDEX BP: 41 ML/M2
ECHO LV EDV NDEX A2C: 50 ML/M2
ECHO LV EJECTION FRACTION A2C: 71 %
ECHO LV EJECTION FRACTION A4C: 64 %
ECHO LV EJECTION FRACTION BIPLANE: 68 % (ref 55–100)
ECHO LV ESV A2C: 30 ML
ECHO LV ESV A4C: 22 ML
ECHO LV ESV BP: 26 ML (ref 19–49)
ECHO LV ESV INDEX A2C: 15 ML/M2
ECHO LV ESV INDEX A4C: 11 ML/M2
ECHO LV ESV INDEX BP: 13 ML/M2
ECHO LV FRACTIONAL SHORTENING: 34 % (ref 28–44)
ECHO LV INTERNAL DIMENSION DIASTOLE INDEX: 2.5 CM/M2
ECHO LV INTERNAL DIMENSION DIASTOLIC: 5 CM (ref 3.9–5.3)
ECHO LV INTERNAL DIMENSION SYSTOLIC INDEX: 1.65 CM/M2
ECHO LV INTERNAL DIMENSION SYSTOLIC: 3.3 CM
ECHO LV IVSD: 1.4 CM (ref 0.6–0.9)
ECHO LV MASS 2D: 233.7 G (ref 67–162)
ECHO LV MASS INDEX 2D: 116.9 G/M2 (ref 43–95)
ECHO LV POSTERIOR WALL DIASTOLIC: 1 CM (ref 0.6–0.9)
ECHO LV RELATIVE WALL THICKNESS RATIO: 0.4

## 2023-01-23 ENCOUNTER — TELEPHONE (OUTPATIENT)
Dept: CARDIOLOGY CLINIC | Age: 68
End: 2023-01-23

## 2023-01-23 NOTE — TELEPHONE ENCOUNTER
TC to pt, ID verified. Advised pt that the echo was read, and I will message Dr. Mary Leon about it, however he is not back in the office until later this week. Also advised it looks like she was to have a renal ultrasound. Provided # for central scheduling. Pt will set up. Advised when I call about echo we can set up follow up for after ultrasound. Pt is agreeable.

## 2023-01-23 NOTE — TELEPHONE ENCOUNTER
Patient is calling because she would like her echocardiogram results from 1/12/23.     Please assist.    [phone]

## 2023-01-26 ENCOUNTER — HOSPITAL ENCOUNTER (OUTPATIENT)
Dept: VASCULAR SURGERY | Age: 68
Discharge: HOME | End: 2023-01-26
Attending: SPECIALIST
Payer: MEDICARE

## 2023-01-26 DIAGNOSIS — I42.8 NICM (NONISCHEMIC CARDIOMYOPATHY) (HCC): ICD-10-CM

## 2023-01-26 DIAGNOSIS — I10 ESSENTIAL HYPERTENSION: ICD-10-CM

## 2023-01-26 PROCEDURE — 93975 VASCULAR STUDY: CPT

## 2023-01-26 NOTE — TELEPHONE ENCOUNTER
RC from pt, ID verified. Advised echo normal. Pt had renal ultrasound today. Appt made for next week to review testing.

## 2023-01-27 LAB
ABDOMINAL PROX AORTA VEL: 109.4 CM/S
CELIAC EDV: 20.2 CM/S
CELIAC PSV: 128.8 CM/S
DIST AORTIC AP: 1.71 CM
LEFT KIDNEY LENGTH: 10.98 CM
LEFT KIDNEY WIDTH: 4.7 CM
LEFT RENAL DIST DIAS: 14.4 CM/S
LEFT RENAL DIST RAR: 0.67
LEFT RENAL DIST RI: 0.8
LEFT RENAL DIST SYS: 73.6 CM/S
LEFT RENAL LOWER PARENCHYMA MAX: 15.3 CM/S
LEFT RENAL LOWER PARENCHYMA MIN: 6.7 CM/S
LEFT RENAL LOWER PARENCHYMA RI: 0.56
LEFT RENAL MID DIAS: 14.3 CM/S
LEFT RENAL MID RAR: 0.7
LEFT RENAL MID RI: 0.81
LEFT RENAL MID SYS: 76.8 CM/S
LEFT RENAL MIDDLE PARENCHYMA MAX: 21.4 CM/S
LEFT RENAL MIDDLE PARENCHYMA MIN: 7.9 CM/S
LEFT RENAL MIDDLE PARENCHYMA RI: 0.63
LEFT RENAL PROX DIAS: 14.6 CM/S
LEFT RENAL PROX RAR: 0.67
LEFT RENAL PROX RI: 0.8
LEFT RENAL PROX SYS: 73.7 CM/S
LEFT RENAL UPPER PARENCHYMA MAX: 21.4 CM/S
LEFT RENAL UPPER PARENCHYMA MIN: 8.5 CM/S
LEFT RENAL UPPER PARENCHYMA RI: 0.6
PROX AORTIC AP: 2.61 CM
PROX SMA EDV: 12.9 CM/S
PROX SMA PSV: 121.7 CM/S
RIGHT KIDNEY LENGTH: 10.43 CM
RIGHT KIDNEY WIDTH: 4.78 CM
RIGHT RENAL DIST DIAS: 13.7 CM/S
RIGHT RENAL DIST RAR: 0.53
RIGHT RENAL DIST RI: 0.77
RIGHT RENAL DIST SYS: 58.3 CM/S
RIGHT RENAL LOWER PARENCHYMA MAX: 23.4 CM/S
RIGHT RENAL LOWER PARENCHYMA MIN: 8.6 CM/S
RIGHT RENAL LOWER PARENCHYMA RI: 0.63
RIGHT RENAL MID DIAS: 38.9 CM/S
RIGHT RENAL MID RAR: 1.26
RIGHT RENAL MID RI: 0.72
RIGHT RENAL MID SYS: 137.5 CM/S
RIGHT RENAL MIDDLE PARENCHYMA MAX: 26.7 CM/S
RIGHT RENAL MIDDLE PARENCHYMA MIN: 9.1 CM/S
RIGHT RENAL MIDDLE PARENCHYMA RI: 0.66
RIGHT RENAL PROX DIAS: 16 CM/S
RIGHT RENAL PROX RAR: 0.76
RIGHT RENAL PROX RI: 0.81
RIGHT RENAL PROX SYS: 82.8 CM/S
RIGHT RENAL UPPER PARENCHYMA MAX: 23.4 CM/S
RIGHT RENAL UPPER PARENCHYMA MIN: 8 CM/S
RIGHT RENAL UPPER PARENCHYMA RI: 0.66

## 2023-01-31 ENCOUNTER — OFFICE VISIT (OUTPATIENT)
Dept: CARDIOLOGY CLINIC | Age: 68
End: 2023-01-31
Payer: MEDICARE

## 2023-01-31 VITALS
RESPIRATION RATE: 16 BRPM | SYSTOLIC BLOOD PRESSURE: 138 MMHG | DIASTOLIC BLOOD PRESSURE: 60 MMHG | OXYGEN SATURATION: 98 % | BODY MASS INDEX: 45.16 KG/M2 | HEART RATE: 87 BPM | HEIGHT: 60 IN | WEIGHT: 230 LBS

## 2023-01-31 DIAGNOSIS — E66.01 OBESITY, CLASS III, BMI 40-49.9 (MORBID OBESITY) (HCC): ICD-10-CM

## 2023-01-31 DIAGNOSIS — I10 ESSENTIAL HYPERTENSION: Primary | ICD-10-CM

## 2023-01-31 PROCEDURE — 3017F COLORECTAL CA SCREEN DOC REV: CPT | Performed by: SPECIALIST

## 2023-01-31 PROCEDURE — G8536 NO DOC ELDER MAL SCRN: HCPCS | Performed by: SPECIALIST

## 2023-01-31 PROCEDURE — G9899 SCRN MAM PERF RSLTS DOC: HCPCS | Performed by: SPECIALIST

## 2023-01-31 PROCEDURE — 1123F ACP DISCUSS/DSCN MKR DOCD: CPT | Performed by: SPECIALIST

## 2023-01-31 PROCEDURE — 1101F PT FALLS ASSESS-DOCD LE1/YR: CPT | Performed by: SPECIALIST

## 2023-01-31 PROCEDURE — 3075F SYST BP GE 130 - 139MM HG: CPT | Performed by: SPECIALIST

## 2023-01-31 PROCEDURE — G8427 DOCREV CUR MEDS BY ELIG CLIN: HCPCS | Performed by: SPECIALIST

## 2023-01-31 PROCEDURE — 1090F PRES/ABSN URINE INCON ASSESS: CPT | Performed by: SPECIALIST

## 2023-01-31 PROCEDURE — G8399 PT W/DXA RESULTS DOCUMENT: HCPCS | Performed by: SPECIALIST

## 2023-01-31 PROCEDURE — G8510 SCR DEP NEG, NO PLAN REQD: HCPCS | Performed by: SPECIALIST

## 2023-01-31 PROCEDURE — 99214 OFFICE O/P EST MOD 30 MIN: CPT | Performed by: SPECIALIST

## 2023-01-31 PROCEDURE — 3078F DIAST BP <80 MM HG: CPT | Performed by: SPECIALIST

## 2023-01-31 PROCEDURE — G8417 CALC BMI ABV UP PARAM F/U: HCPCS | Performed by: SPECIALIST

## 2023-01-31 NOTE — PROGRESS NOTES
Visit Vitals  /60   Pulse 87   Resp 16   Ht 5' (1.524 m)   Wt 230 lb (104.3 kg)   SpO2 98%   BMI 44.92 kg/m²

## 2023-01-31 NOTE — PROGRESS NOTES
[address] [facility]                                                            OFFICE NOTE        Jeffrey Diaz M.D.,FREDDY Dayna Ellington   1955  [id number]    Date/Time:  1/31/20237:11 AM            SUBJECTIVE:  She seems to doing better. She says she feels fine she goes to water aerobics 4-5 times a week with no problems. She has had no chest pain or shortness of breath palpitation presyncopal syncopal episode and she tells me that her blood pressure at home has been fairly steady no further surges be noted. Assessment/Plan    1. Cardiomyopathy: We have discussed the results of echocardiogram January thousand 23 with normal ejection fraction of 55 to 60%. Clinically she seems compensated at this time. Now status post nuclear stress test with no clear evidence for ischemia and an estimated normal ejection fraction at 71%. This was discussed with the patient. Nonetheless is aware limitation of nuclear stress testing evaluated ejection fraction. For now continue Toprol and Avapro. 2.  Hypertension: Her blood pressure seems to be better controlled no further \"spikes\". Renal artery ultrasound was negative for renal artery stenosis. Plasma catecholamines PTH and BMP were essentially normal.      It is possible that this fluctuation in blood pressure related to medication time. Continue Avapro same dose in the morning. She began does not want to increase the Avapro 300 even if blood pressure remains at home apparently borderline. Continue amlodipine 10 mg daily she is aware of mild lower extremity edema being related to amlodipine. She is okay to continue with that. She is already aware of nutrition and weight loss. Also aware exercise and salt avoidance if possible.     She wants to hold off increasing any medication and she wants to try to lose weight before. Continue Toprol and hydrochlorothiazide at night hydrochlorothiazide was started recently by her primary care physician. 3.  Hyperlipidemia: Closely followed by her primary care physician. 4.  Obstructive sleep apnea: On CPAP. 5.  PSVT: No recurrence thus far continue Toprol Short episodes were noted on the Holter monitor. Otherwise see her back in 4 months      HPI     Very pleasant 79-year-old female with past medical history most remarkable for hypertension and sleep apnea on CPAP who presents today for cardiac evaluation of the abnormal echocardiogram.    To be noted the patient experience COVID in August 2022. She has some issues with dyspnea at that time. She tells me she feels better at this point. Past medical past medical history: As above. Past Surgical History:   Procedure Laterality Date    HX APPENDECTOMY        HX HYSTERECTOMY   approx 1987 2/2 uterine fibroids    HX OTHER SURGICAL   2002     hiatal hernia repair       Social history: She does not smoke she drinks occasionally. She is retired from [employer]. Family history: On significant coronary events. CARDIAC STUDIES        01/12/23    ECHO ADULT FOLLOW-UP OR LIMITED 01/12/2023 1/12/2023    Interpretation Summary    Left Ventricle: Normal left ventricular systolic function with a visually estimated EF of 55 - 60%. EF by 2D Simpsons Biplane is 68%. Left ventricle size is normal. Mildly increased wall thickness. Normal wall motion. Signed by: Tomas Burnett MD on 1/12/2023  5:17 PM            12/01/22    NUCLEAR CARDIAC STRESS TEST 12/02/2022 12/9/2022    Interpretation Summary    Nuclear Findings: There is a mild severity left ventricular stress perfusion defect that is small to medium in size present in the mid to distal anterior segment(s) that is predominantly fixed. There is normal wall motion in the defect area.  The defect appears to be probable artifact caused by breast attenuation. Nuclear Findings: Normal left ventricular systolic function post-stress. Nuclear Findings: Normal pharmacological myocardial perfusion study. ECG: Resting ECG demonstrates normal sinus rhythm. ECG: Stress ECG was negative for ischemia. Stress Test: A pharmacological stress test was performed using lexiscan. Hemodynamics are adequate for diagnosis. Blood pressure demonstrated a normal response and heart rate demonstrated a normal response to stress. The patient's heart rate recovery was normal.    Post-stress ejection fraction is 71%. Stress Combined Conclusion: Normal pharmacological myocardial perfusion study. Signed by: Earma Cooks, MD on 12/2/2022  3:05 PM                    EKG Results       None                IMAGING      MRI Results (most recent):  Results from [place] encounter on 03/31/16    MRI SHOULDER RT WO CONT    Narrative  **Final Report**      ICD Codes / Adm. Diagnosis: 715.91  726.2 / Osteoarthrosis, unspecified wh  Other affections of shoulder  Examination:  MR SHOULDER WO CON RT  - [id number] - Mar 31 2016  7:49AM  Accession No:  [id number]  Reason:  degenerative joint disease of rt shoulder      REPORT:  EXAM:  MR SHOULDER WO CON RT    INDICATION: Right shoulder pain. No injury    COMPARISON: None    TECHNIQUE: Axial proton density fat-saturated; oblique coronal T1, T2  fat-saturated, and proton density fat-saturated; and oblique sagittal T2  fat-saturated MRI of the right shoulder. CONTRAST: None. FINDINGS: A.C. joint: There is moderate degeneration of the  acromioclavicular joint. Minimal subacromial spurring Anterior acromion  process type: 2    Bone marrow: There is irregularity of the marrow of the inferior medial  humeral head however this is interspersed with fat of uncertain but doubtful  clinical significance. Joint fluid: There is trace fluid subdeltoid bursa    Rotator cuff tendons:  There is tendinopathy of the supraspinatus and  anterior infraspinatus with diffuse shallow partial-thickness undersurface  tearing. Question small high-grade partial-thickness interstitial versus  undersurface tear posterior supraspinatus insertion. No full-thickness  component to the tear. Subscapularis and teres minor are intact    Biceps tendon: Intact and located within the bicipital groove. There is an 8  mm loose body within the biceps long head tendon sheath    Muscles: There is minimal atrophy of the infraspinatus    Glenoid labrum: Intact. Joint capsule: within normal limits. Glenohumeral articular cartilage: Intact. No focal osteochondral lesion. Soft tissue mass: None. Impression  :  1. Tendinopathy of the supraspinatus and anterior infraspinatus with diffuse  shallow partial-thickness undersurface tearing. Question small high-grade  partial-thickness undersurface/interstitial tear of the posterior  supraspinatus. No full-thickness tear  2. 8 mm loose body in the biceps long head tendon sheath. A donor site is  not identified          Signing/Reading Doctor: Farnaz Underwood ([id number])  Approved: Farnaz Underwood ([id number])  Mar 31 2016  8:11AM      CT Results (most recent):  Results from [place] encounter on 11/11/22    CT HEAD WO CONT    Narrative  EXAM: CT HEAD WO CONT    INDICATION: intermittent confusion, hypertensive emergency, likely PRESS    COMPARISON: None. CONTRAST: None. TECHNIQUE: Unenhanced CT of the head was performed using 5 mm images. Brain and  bone windows were generated. Coronal and sagittal reformats. CT dose reduction  was achieved through use of a standardized protocol tailored for this  examination and automatic exposure control for dose modulation. FINDINGS:  The ventricles and sulci are normal in size, shape and configuration. . There is  no significant white matter disease. There is no intracranial hemorrhage,  extra-axial collection, or mass effect. The basilar cisterns are open.  No CT  evidence of acute infarct. The bone windows demonstrate no abnormalities. The visualized portions of the  paranasal sinuses and mastoid air cells are clear. Impression  No acute intracranial abnormality. XR Results (most recent):  Results from Hospital Encounter encounter on 08/25/22    XR CHEST PA LAT    Narrative  Indication:  Wheezing, syncope. Exam: PA and lateral views of the chest.    Direct comparison is made to prior CXR dated January 2013. Findings: Cardiomediastinal silhouette is within normal limits. Lungs are clear  bilaterally. Pleural spaces are normal. Osseous structures are intact. Impression  No acute cardiopulmonary disease. Past Medical History:   Diagnosis Date    HTN (hypertension) 1/15/2010    Sleep apnea 1/20/2015    Sleep apnea, obstructive 1/5/2016     Past Surgical History:   Procedure Laterality Date    HX APPENDECTOMY      HX HYSTERECTOMY  approx 1987 2/2 uterine fibroids    HX OTHER SURGICAL  2002    hiatal hernia repair      Social History     Tobacco Use    Smoking status: Never    Smokeless tobacco: Never   Vaping Use    Vaping Use: Never used   Substance Use Topics    Alcohol use: Yes     Alcohol/week: 4.2 standard drinks     Types: 5 Glasses of wine per week     Comment: 1 glass per night    Drug use: No     Family History   Problem Relation Age of Onset    Cancer Mother         oral    Hypertension Father     Prostate Cancer Father     Hypertension Sister     Hypertension Brother      No Known Allergies      There were no vitals taken for this visit. There were no vitals filed for this visit. Review of Systems:   Pertinent items are noted in the History of Present Illness. Neck: no JVD  Heart: regular rate and rhythm  Lungs: clear to auscultation bilaterally  Abdomen: soft, non-tender.  Bowel sounds normal. No masses,  no organomegaly  Extremities: edema trace to 1 +      Current Outpatient Medications on File Prior to Visit Medication Sig Dispense Refill    amLODIPine (NORVASC) 10 mg tablet Take 1 Tablet by mouth daily. 90 Tablet 2    metoprolol succinate (TOPROL-XL) 25 mg XL tablet Take 1 Tablet by mouth daily. 90 Tablet 2    irbesartan (AVAPRO) 150 mg tablet Take 1 Tablet by mouth nightly. 90 Tablet 2    Blood-Glucose Meter monitoring kit Check blood glucose daily as needed for symptoms of faituge, sweating, passing out 1 Kit 0    pneumococcal 20-endy conj-dip, PF, (PREVNAR 20) 0.5 mL syrg injection 0.5 mL by IntraMUSCular route PRIOR TO DISCHARGE for 1 dose. 1 Each 0    hydroCHLOROthiazide (HYDRODIURIL) 12.5 mg tablet TAKE 1 TABLET BY MOUTH EVERY DAY 90 Tablet 2    meloxicam (MOBIC) 15 mg tablet TAKE 1 TABLET BY MOUTH EVERY DAY AS NEEDED FOR PAIN 30 Tablet 0    magnesium 250 mg tab Take 1 Tablet by mouth nightly. acetaminophen (TYLENOL) 650 mg TbER Take 1,300 mg by mouth daily. YUVAFEM 10 mcg tab vaginal tablet Insert 10 mcg into vagina two (2) times a week. Comp. Stocking,Knee,Regular,Lrg misc 20-30mmHG 1 Units 2     No current facility-administered medications on file prior to visit. Margaret Archuletady had no medications administered during this visit. Current Outpatient Medications   Medication Sig    amLODIPine (NORVASC) 10 mg tablet Take 1 Tablet by mouth daily. metoprolol succinate (TOPROL-XL) 25 mg XL tablet Take 1 Tablet by mouth daily. irbesartan (AVAPRO) 150 mg tablet Take 1 Tablet by mouth nightly. Blood-Glucose Meter monitoring kit Check blood glucose daily as needed for symptoms of faituge, sweating, passing out    pneumococcal 20-endy conj-dip, PF, (PREVNAR 20) 0.5 mL syrg injection 0.5 mL by IntraMUSCular route PRIOR TO DISCHARGE for 1 dose.    hydroCHLOROthiazide (HYDRODIURIL) 12.5 mg tablet TAKE 1 TABLET BY MOUTH EVERY DAY    meloxicam (MOBIC) 15 mg tablet TAKE 1 TABLET BY MOUTH EVERY DAY AS NEEDED FOR PAIN    magnesium 250 mg tab Take 1 Tablet by mouth nightly.     acetaminophen (TYLENOL) 650 mg TbER Take 1,300 mg by mouth daily. YUVAFEM 10 mcg tab vaginal tablet Insert 10 mcg into vagina two (2) times a week. Comp. Stocking,Knee,Regular,Lrg misc 20-30mmHG     No current facility-administered medications for this visit. Lab Results   Component Value Date/Time    Cholesterol, total 221 (H) 05/11/2022 09:23 AM    HDL Cholesterol 75 05/11/2022 09:23 AM    LDL, calculated 130 (H) 05/11/2022 09:23 AM    LDL, calculated 110 (H) 09/03/2021 08:50 AM    VLDL, calculated 16 05/11/2022 09:23 AM    VLDL, calculated 14 09/03/2021 08:50 AM    Triglyceride 93 05/11/2022 09:23 AM    CHOL/HDL Ratio 2.4 09/03/2021 08:50 AM       Lab Results   Component Value Date/Time    Sodium 140 12/06/2022 03:03 PM    Potassium 3.9 12/06/2022 03:03 PM    Chloride 105 12/06/2022 03:03 PM    CO2 29 12/06/2022 03:03 PM    Anion gap 6 12/06/2022 03:03 PM    Glucose 34 (LL) 12/06/2022 03:03 PM    BUN 19 12/06/2022 03:03 PM    Creatinine 0.84 12/06/2022 03:03 PM    BUN/Creatinine ratio 23 (H) 12/06/2022 03:03 PM    GFR est AA >60 08/25/2022 01:01 PM    GFR est non-AA 58 (L) 08/25/2022 01:01 PM    Calcium 9.4 12/06/2022 03:03 PM    Calcium 9.5 12/06/2022 03:03 PM       Lab Results   Component Value Date/Time    ALT (SGPT) 23 12/06/2022 03:03 PM    Alk.  phosphatase 99 12/06/2022 03:03 PM    Bilirubin, total 0.3 12/06/2022 03:03 PM       Lab Results   Component Value Date/Time    WBC 9.0 11/11/2022 07:23 PM    Hemoglobin (POC) 5.1 12/09/2022 10:51 AM    HGB 13.6 11/11/2022 07:23 PM    HCT 42.8 11/11/2022 07:23 PM    PLATELET 084 85/68/0757 07:23 PM    MCV 91.1 11/11/2022 07:23 PM       Lab Results   Component Value Date/Time    TSH 1.46 12/06/2022 03:03 PM         Lab Results   Component Value Date/Time    Cholesterol, total 221 (H) 05/11/2022 09:23 AM    Cholesterol, total 210 (H) 09/03/2021 08:50 AM    Cholesterol, total 230 (H) 07/02/2020 11:22 AM    Cholesterol, total 215 (H) 03/26/2018 10:59 AM    Cholesterol, total 220 (H) 01/31/2017 11:33 AM    HDL Cholesterol 75 05/11/2022 09:23 AM    HDL Cholesterol 86 09/03/2021 08:50 AM    HDL Cholesterol 64 07/02/2020 11:22 AM    HDL Cholesterol 56 03/26/2018 10:59 AM    HDL Cholesterol 59 01/31/2017 11:33 AM    LDL, calculated 130 (H) 05/11/2022 09:23 AM    LDL, calculated 110 (H) 09/03/2021 08:50 AM    LDL, calculated 145 (H) 07/02/2020 11:22 AM    LDL, calculated 133 (H) 03/26/2018 10:59 AM    LDL, calculated 133 (H) 01/31/2017 11:33 AM    LDL, calculated 135 (H) 06/05/2015 08:38 AM    Triglyceride 93 05/11/2022 09:23 AM    Triglyceride 70 09/03/2021 08:50 AM    Triglyceride 107 07/02/2020 11:22 AM    Triglyceride 129 03/26/2018 10:59 AM    Triglyceride 142 01/31/2017 11:33 AM    CHOL/HDL Ratio 2.4 09/03/2021 08:50 AM    CHOL/HDL Ratio 4.0 09/08/2010 09:44 AM    CHOL/HDL Ratio 3.6 02/19/2010 10:41 AM    CHOL/HDL Ratio 3.4 02/16/2009 09:30 AM                Please note that this dictation was completed with [employer], the [employer] voice recognition software. Quite often unanticipated grammatical, syntax, homophones, and other interpretative errors are inadvertently transcribed by the computer software. Please disregard these errors. Please excuse any errors that have escaped final proofreading.

## 2023-04-26 DIAGNOSIS — M17.10 PRIMARY OSTEOARTHRITIS OF KNEE, UNSPECIFIED LATERALITY: ICD-10-CM

## 2023-04-26 RX ORDER — MELOXICAM 15 MG/1
15 TABLET ORAL DAILY
Qty: 30 TABLET | Refills: 0 | Status: SHIPPED | OUTPATIENT
Start: 2023-04-26

## 2023-05-22 ENCOUNTER — OFFICE VISIT (OUTPATIENT)
Age: 68
End: 2023-05-22
Payer: MEDICARE

## 2023-05-22 VITALS
SYSTOLIC BLOOD PRESSURE: 179 MMHG | HEART RATE: 81 BPM | WEIGHT: 238.4 LBS | BODY MASS INDEX: 46.81 KG/M2 | DIASTOLIC BLOOD PRESSURE: 67 MMHG | TEMPERATURE: 97 F | RESPIRATION RATE: 16 BRPM | HEIGHT: 60 IN | OXYGEN SATURATION: 96 %

## 2023-05-22 DIAGNOSIS — E16.2 HYPOGLYCEMIA: ICD-10-CM

## 2023-05-22 DIAGNOSIS — R63.5 WEIGHT GAIN: ICD-10-CM

## 2023-05-22 DIAGNOSIS — I10 ESSENTIAL (PRIMARY) HYPERTENSION: Primary | ICD-10-CM

## 2023-05-22 DIAGNOSIS — R60.1 GENERALIZED EDEMA: ICD-10-CM

## 2023-05-22 DIAGNOSIS — M17.0 PRIMARY OSTEOARTHRITIS OF BOTH KNEES: ICD-10-CM

## 2023-05-22 PROCEDURE — G8417 CALC BMI ABV UP PARAM F/U: HCPCS | Performed by: INTERNAL MEDICINE

## 2023-05-22 PROCEDURE — 1036F TOBACCO NON-USER: CPT | Performed by: INTERNAL MEDICINE

## 2023-05-22 PROCEDURE — 99214 OFFICE O/P EST MOD 30 MIN: CPT | Performed by: INTERNAL MEDICINE

## 2023-05-22 PROCEDURE — 1090F PRES/ABSN URINE INCON ASSESS: CPT | Performed by: INTERNAL MEDICINE

## 2023-05-22 PROCEDURE — G8399 PT W/DXA RESULTS DOCUMENT: HCPCS | Performed by: INTERNAL MEDICINE

## 2023-05-22 PROCEDURE — 3017F COLORECTAL CA SCREEN DOC REV: CPT | Performed by: INTERNAL MEDICINE

## 2023-05-22 PROCEDURE — G8427 DOCREV CUR MEDS BY ELIG CLIN: HCPCS | Performed by: INTERNAL MEDICINE

## 2023-05-22 PROCEDURE — 3078F DIAST BP <80 MM HG: CPT | Performed by: INTERNAL MEDICINE

## 2023-05-22 PROCEDURE — 3077F SYST BP >= 140 MM HG: CPT | Performed by: INTERNAL MEDICINE

## 2023-05-22 PROCEDURE — 1123F ACP DISCUSS/DSCN MKR DOCD: CPT | Performed by: INTERNAL MEDICINE

## 2023-05-22 RX ORDER — MULTIVITAMIN WITH IRON
1 TABLET ORAL NIGHTLY
COMMUNITY

## 2023-05-22 RX ORDER — CHLORAL HYDRATE 500 MG
CAPSULE ORAL
COMMUNITY

## 2023-05-22 RX ORDER — FUROSEMIDE 20 MG/1
TABLET ORAL
COMMUNITY
Start: 2020-07-04

## 2023-05-22 RX ORDER — TRAMADOL HYDROCHLORIDE 50 MG/1
50 TABLET ORAL EVERY 4 HOURS PRN
Qty: 10 TABLET | Refills: 0 | Status: SHIPPED | OUTPATIENT
Start: 2023-05-22 | End: 2023-06-05

## 2023-05-22 RX ORDER — CHOLECALCIFEROL (VITAMIN D3) 1250 MCG
CAPSULE ORAL
COMMUNITY

## 2023-05-22 RX ORDER — NICOTINE POLACRILEX 2 MG
GUM BUCCAL
COMMUNITY

## 2023-05-22 RX ORDER — IRBESARTAN 300 MG/1
300 TABLET ORAL DAILY
Qty: 30 TABLET | Refills: 3 | Status: SHIPPED | OUTPATIENT
Start: 2023-05-22

## 2023-05-22 RX ORDER — AMOXICILLIN 250 MG
CAPSULE ORAL
COMMUNITY

## 2023-05-22 SDOH — ECONOMIC STABILITY: TRANSPORTATION INSECURITY
IN THE PAST 12 MONTHS, HAS LACK OF TRANSPORTATION KEPT YOU FROM MEETINGS, WORK, OR FROM GETTING THINGS NEEDED FOR DAILY LIVING?: NO

## 2023-05-22 SDOH — ECONOMIC STABILITY: FOOD INSECURITY: WITHIN THE PAST 12 MONTHS, THE FOOD YOU BOUGHT JUST DIDN'T LAST AND YOU DIDN'T HAVE MONEY TO GET MORE.: NEVER TRUE

## 2023-05-22 SDOH — ECONOMIC STABILITY: HOUSING INSECURITY
IN THE LAST 12 MONTHS, WAS THERE A TIME WHEN YOU DID NOT HAVE A STEADY PLACE TO SLEEP OR SLEPT IN A SHELTER (INCLUDING NOW)?: NO

## 2023-05-22 SDOH — ECONOMIC STABILITY: FOOD INSECURITY: WITHIN THE PAST 12 MONTHS, YOU WORRIED THAT YOUR FOOD WOULD RUN OUT BEFORE YOU GOT MONEY TO BUY MORE.: NEVER TRUE

## 2023-05-22 SDOH — ECONOMIC STABILITY: INCOME INSECURITY: HOW HARD IS IT FOR YOU TO PAY FOR THE VERY BASICS LIKE FOOD, HOUSING, MEDICAL CARE, AND HEATING?: SOMEWHAT HARD

## 2023-05-22 ASSESSMENT — PATIENT HEALTH QUESTIONNAIRE - PHQ9
1. LITTLE INTEREST OR PLEASURE IN DOING THINGS: 0
SUM OF ALL RESPONSES TO PHQ QUESTIONS 1-9: 0
2. FEELING DOWN, DEPRESSED OR HOPELESS: 0
SUM OF ALL RESPONSES TO PHQ9 QUESTIONS 1 & 2: 0
SUM OF ALL RESPONSES TO PHQ QUESTIONS 1-9: 0

## 2023-05-22 ASSESSMENT — SOCIAL DETERMINANTS OF HEALTH (SDOH): HOW HARD IS IT FOR YOU TO PAY FOR THE VERY BASICS LIKE FOOD, HOUSING, MEDICAL CARE, AND HEATING?: NOT HARD AT ALL

## 2023-05-22 NOTE — PROGRESS NOTES
Ms. Jorge Alberto Disla is presenting to follow up     CC:  Hypertension and Chronic Kidney Disease       HPI:    Ms. Jorge Alberto Disla   is a 75 y.o. female with a hx of   Last seen by me on  April 2023    She has had 3 basic metabolic panels with low sugars, 64, 34 and 55. She did also episodes of confusion and \" passing out\"   Work up for cardiac etiology was negative. Head CT and carotid negative. She is eating more frequent high protein meals and had no further passing out episodes   Discussed she would benefit from endocrine evaluation   She did not obtain a meter \" I do not want to stick myself\"        She has mild cardiomyopathy likely due to long standing HTN  She saw cards Dr Estella Medina on 10/07/2022       Cardiomyopathy: She does seem to have mild cardiomyopathy with ejection fraction of 45 to 50%. Etiology of this is unclear. Seemingly relatively asymptomatic at this time. Negative nuclear test for ischemia     Current  meds currently on and avapro 300mg and metoprolol 25mg XL and HCTZ 12.5mg S( stopped amlodipine due to  swelling in legs)   BP is high today   She did notice improvement in swelling with stopping amlodipine    Weight gain  Reports weight gain despite eating healthy diet, reports eating plants and vegetables. Eats turkey, chicken, fish. Eats occasional pasta  Tea with sugar - dsicussed cutting it out     She is having severe knee pain B/L left more then right - she needs a knee replacement but BMI must be under 40.  She is frustrated        [facility]     Review of systems:  Constitutional: negative for fever, chills, weight loss, night sweats       10 systems reviewed and negative other then HPI     Past Medical History:   Diagnosis Date    HTN (hypertension) 1/15/2010    Sleep apnea 1/20/2015    Sleep apnea, obstructive 1/5/2016        Past Surgical History:   Procedure Laterality Date    APPENDECTOMY      HYSTERECTOMY (CERVIX STATUS UNKNOWN)  approx

## 2023-05-22 NOTE — PROGRESS NOTES
1. \"Have you been to the ER, urgent care clinic since your last visit? Hospitalized since your last visit? \" No    2. \"Have you seen or consulted any other health care providers outside of the [address] [place] [address] since your last visit? \" No     3. For patients aged 39-70: Has the patient had a colonoscopy / FIT/ Cologuard? Yes - no Care Gap present      If the patient is female:    4. For patients aged 41-77: Has the patient had a mammogram within the past 2 years? Yes - no Care Gap present      5. For patients aged 21-65: Has the patient had a pap smear?  NA

## 2023-05-22 NOTE — PATIENT INSTRUCTIONS
D/I: Pt follows some simple commands, moves all extremities against gravity. Precedex drip at 0.2 mcg/kg/mn. Remains intubated with no changes in vent settings. Heart rate paced 40's-50's, blood pressure within parameters. Unable to place. Feeding tube today. No BM. Lasix IV with good results.   A: No major shift events today. Neuro status remains stable.   P: Continue with neuro checks and monitor VS. Update MD with concerns.                        Take lasix daily due to swelling  Elevate legs  Take avapro 300mg daily  Take lasix 20mg daily   The meloxicam can cause rise in blood pressure  Try the tramadol for pain   Obtain continuous glucose monitor

## 2023-06-24 DIAGNOSIS — M17.10 UNILATERAL PRIMARY OSTEOARTHRITIS, UNSPECIFIED KNEE: ICD-10-CM

## 2023-06-26 RX ORDER — MELOXICAM 15 MG/1
TABLET ORAL
Qty: 30 TABLET | Refills: 2 | Status: SHIPPED | OUTPATIENT
Start: 2023-06-26

## 2023-07-10 ENCOUNTER — OFFICE VISIT (OUTPATIENT)
Age: 68
End: 2023-07-10
Payer: MEDICARE

## 2023-07-10 VITALS
HEART RATE: 89 BPM | BODY MASS INDEX: 47.51 KG/M2 | DIASTOLIC BLOOD PRESSURE: 69 MMHG | SYSTOLIC BLOOD PRESSURE: 171 MMHG | WEIGHT: 242 LBS | TEMPERATURE: 97.7 F | HEIGHT: 60 IN | OXYGEN SATURATION: 98 % | RESPIRATION RATE: 16 BRPM

## 2023-07-10 DIAGNOSIS — E66.01 MORBID OBESITY WITH BODY MASS INDEX OF 45.0-49.9 IN ADULT (HCC): Primary | ICD-10-CM

## 2023-07-10 DIAGNOSIS — I10 ESSENTIAL HYPERTENSION: ICD-10-CM

## 2023-07-10 PROCEDURE — 1036F TOBACCO NON-USER: CPT | Performed by: INTERNAL MEDICINE

## 2023-07-10 PROCEDURE — 3017F COLORECTAL CA SCREEN DOC REV: CPT | Performed by: INTERNAL MEDICINE

## 2023-07-10 PROCEDURE — 3078F DIAST BP <80 MM HG: CPT | Performed by: INTERNAL MEDICINE

## 2023-07-10 PROCEDURE — 1090F PRES/ABSN URINE INCON ASSESS: CPT | Performed by: INTERNAL MEDICINE

## 2023-07-10 PROCEDURE — 99214 OFFICE O/P EST MOD 30 MIN: CPT | Performed by: INTERNAL MEDICINE

## 2023-07-10 PROCEDURE — G8427 DOCREV CUR MEDS BY ELIG CLIN: HCPCS | Performed by: INTERNAL MEDICINE

## 2023-07-10 PROCEDURE — 3077F SYST BP >= 140 MM HG: CPT | Performed by: INTERNAL MEDICINE

## 2023-07-10 PROCEDURE — 1123F ACP DISCUSS/DSCN MKR DOCD: CPT | Performed by: INTERNAL MEDICINE

## 2023-07-10 PROCEDURE — G8399 PT W/DXA RESULTS DOCUMENT: HCPCS | Performed by: INTERNAL MEDICINE

## 2023-07-10 PROCEDURE — G8417 CALC BMI ABV UP PARAM F/U: HCPCS | Performed by: INTERNAL MEDICINE

## 2023-07-10 SDOH — ECONOMIC STABILITY: FOOD INSECURITY: WITHIN THE PAST 12 MONTHS, YOU WORRIED THAT YOUR FOOD WOULD RUN OUT BEFORE YOU GOT MONEY TO BUY MORE.: NEVER TRUE

## 2023-07-10 SDOH — ECONOMIC STABILITY: FOOD INSECURITY: WITHIN THE PAST 12 MONTHS, THE FOOD YOU BOUGHT JUST DIDN'T LAST AND YOU DIDN'T HAVE MONEY TO GET MORE.: NEVER TRUE

## 2023-07-10 ASSESSMENT — PATIENT HEALTH QUESTIONNAIRE - PHQ9
SUM OF ALL RESPONSES TO PHQ9 QUESTIONS 1 & 2: 0
SUM OF ALL RESPONSES TO PHQ QUESTIONS 1-9: 0
2. FEELING DOWN, DEPRESSED OR HOPELESS: 0
SUM OF ALL RESPONSES TO PHQ9 QUESTIONS 1 & 2: 0
SUM OF ALL RESPONSES TO PHQ QUESTIONS 1-9: 0
1. LITTLE INTEREST OR PLEASURE IN DOING THINGS: 0
SUM OF ALL RESPONSES TO PHQ QUESTIONS 1-9: 0
2. FEELING DOWN, DEPRESSED OR HOPELESS: 0
SUM OF ALL RESPONSES TO PHQ QUESTIONS 1-9: 0
1. LITTLE INTEREST OR PLEASURE IN DOING THINGS: 0
SUM OF ALL RESPONSES TO PHQ QUESTIONS 1-9: 0
SUM OF ALL RESPONSES TO PHQ QUESTIONS 1-9: 0

## 2023-07-10 ASSESSMENT — SOCIAL DETERMINANTS OF HEALTH (SDOH): HOW HARD IS IT FOR YOU TO PAY FOR THE VERY BASICS LIKE FOOD, HOUSING, MEDICAL CARE, AND HEATING?: NOT HARD AT ALL

## 2023-07-10 NOTE — PATIENT INSTRUCTIONS
recommend checking blood pressure with goal of less than  130/85 on average. Follow lo sodium diet. Recommend cardiovascular exercise regularly. Work on weight reduction. Call with blood pressure readings.

## 2023-07-10 NOTE — PROGRESS NOTES
1. \"Have you been to the ER, urgent care clinic since your last visit? Hospitalized since your last visit? \" No    2. \"Have you seen or consulted any other health care providers outside of the [address] [place] [address] since your last visit? \" No     3. For patients aged 43-73: Has the patient had a colonoscopy / FIT/ Cologuard? Yes - no Care Gap present      If the patient is female:    4. For patients aged 43-66: Has the patient had a mammogram within the past 2 years? yes      5.  For patients aged 21-65: Has the patient had a pap smear? yes

## 2023-07-10 NOTE — PROGRESS NOTES
Ms. Eleazar Espinoza is presenting to follow up     CC:  Hypertension       HPI:    Ms. Eleazar Espinoza   is a 75 y.o. female with a hx of HTN and morbid obesity  Last seen by me on  April 2023    She has had 3 basic metabolic panels with low sugars, 64, 34 and 55. She did also episodes of confusion and \" passing out\"   Work up for cardiac etiology was negative. Head CT and carotid negative. She is eating more frequent high protein meals and had no further passing out episodes   Discussed she would benefit from endocrine evaluation   She did not obtain a meter \" I do not want to stick myself\"   I prescribed continuous glucose monitoring and not covered        She has mild cardiomyopathy likely due to long standing HTN  She saw cards Dr Goldie Michelle on 10/07/2022       Cardiomyopathy: She does seem to have mild cardiomyopathy with ejection fraction of 45 to 50%. Etiology of this is unclear. Seemingly relatively asymptomatic at this time. Negative nuclear test for ischemia     Current  meds currently on and avapro 300mg and metoprolol 25mg XL and HCTZ 12.5mg S( stopped amlodipine due to  swelling in legs)   Taking lasix 20mg in AM  BP is high today again   She did notice improvement in swelling   She denies chest pain     Weight gain  Reports weight gain despite eating healthy diet, reports eating plants and vegetables. Eats turkey, chicken, fish.    Eats occasional pasta  Saw nutritionist on Monday     Knee pain is better - since using Frankessence oil        [facility]     Review of systems:  Constitutional: negative for fever, chills, weight loss, night sweats       10 systems reviewed and negative other then HPI     Past Medical History:   Diagnosis Date    HTN (hypertension) 1/15/2010    Sleep apnea 1/20/2015    Sleep apnea, obstructive 1/5/2016        Past Surgical History:   Procedure Laterality Date    APPENDECTOMY      HYSTERECTOMY (CERVIX STATUS UNKNOWN)  approx 1987

## 2023-07-11 LAB
ANION GAP SERPL CALC-SCNC: 6 MMOL/L (ref 5–15)
BUN SERPL-MCNC: 17 MG/DL (ref 6–20)
BUN/CREAT SERPL: 18 (ref 12–20)
CALCIUM SERPL-MCNC: 9.8 MG/DL (ref 8.5–10.1)
CHLORIDE SERPL-SCNC: 103 MMOL/L (ref 97–108)
CO2 SERPL-SCNC: 31 MMOL/L (ref 21–32)
CREAT SERPL-MCNC: 0.92 MG/DL (ref 0.55–1.02)
GLUCOSE SERPL-MCNC: 61 MG/DL (ref 65–100)
POTASSIUM SERPL-SCNC: 3.9 MMOL/L (ref 3.5–5.1)
SODIUM SERPL-SCNC: 140 MMOL/L (ref 136–145)

## 2023-07-20 DIAGNOSIS — I10 ESSENTIAL (PRIMARY) HYPERTENSION: ICD-10-CM

## 2023-07-20 RX ORDER — HYDROCHLOROTHIAZIDE 12.5 MG/1
TABLET ORAL
Qty: 90 TABLET | Refills: 2 | Status: SHIPPED | OUTPATIENT
Start: 2023-07-20

## 2023-07-31 ENCOUNTER — OFFICE VISIT (OUTPATIENT)
Age: 68
End: 2023-07-31
Payer: MEDICARE

## 2023-07-31 VITALS
HEIGHT: 60 IN | WEIGHT: 229 LBS | SYSTOLIC BLOOD PRESSURE: 140 MMHG | OXYGEN SATURATION: 100 % | RESPIRATION RATE: 16 BRPM | HEART RATE: 80 BPM | DIASTOLIC BLOOD PRESSURE: 70 MMHG | BODY MASS INDEX: 44.96 KG/M2

## 2023-07-31 DIAGNOSIS — R55 SYNCOPE AND COLLAPSE: Primary | ICD-10-CM

## 2023-07-31 PROCEDURE — 99214 OFFICE O/P EST MOD 30 MIN: CPT | Performed by: SPECIALIST

## 2023-07-31 PROCEDURE — 93010 ELECTROCARDIOGRAM REPORT: CPT | Performed by: SPECIALIST

## 2023-07-31 PROCEDURE — 3077F SYST BP >= 140 MM HG: CPT | Performed by: SPECIALIST

## 2023-07-31 PROCEDURE — 1123F ACP DISCUSS/DSCN MKR DOCD: CPT | Performed by: SPECIALIST

## 2023-07-31 PROCEDURE — 3017F COLORECTAL CA SCREEN DOC REV: CPT | Performed by: SPECIALIST

## 2023-07-31 PROCEDURE — G8399 PT W/DXA RESULTS DOCUMENT: HCPCS | Performed by: SPECIALIST

## 2023-07-31 PROCEDURE — 93005 ELECTROCARDIOGRAM TRACING: CPT | Performed by: SPECIALIST

## 2023-07-31 PROCEDURE — 1036F TOBACCO NON-USER: CPT | Performed by: SPECIALIST

## 2023-07-31 PROCEDURE — 3078F DIAST BP <80 MM HG: CPT | Performed by: SPECIALIST

## 2023-07-31 PROCEDURE — G8417 CALC BMI ABV UP PARAM F/U: HCPCS | Performed by: SPECIALIST

## 2023-07-31 PROCEDURE — G8427 DOCREV CUR MEDS BY ELIG CLIN: HCPCS | Performed by: SPECIALIST

## 2023-07-31 PROCEDURE — 1090F PRES/ABSN URINE INCON ASSESS: CPT | Performed by: SPECIALIST

## 2023-08-17 DIAGNOSIS — I10 ESSENTIAL (PRIMARY) HYPERTENSION: ICD-10-CM

## 2023-08-17 RX ORDER — IRBESARTAN 300 MG/1
TABLET ORAL
Qty: 90 TABLET | Refills: 1 | Status: SHIPPED | OUTPATIENT
Start: 2023-08-17

## 2023-08-22 NOTE — PATIENT INSTRUCTIONS
Increased the bystolic to 62TB and take lasix as needed  For sleep can take Over the counter melatonin or Time Baltazar    Phone calls/patient messages:            Please allow up to 24 hours for someone in the office to contact you about your call or message. Be mindful your provider may be out of the office or your message may require further review. We encourage you to use [employer] for your messages as this is a faster, more efficient way to communicate with our office                         Medication Refills:            Prescription medications require 48-72 business hours to process. We encourage you to use [employer] for your refills. For controlled medications: Please allow 72 business hours to process. Certain medications may require you to  a written prescription at our office. NO narcotic/controlled medications will be prescribed after 4pm Monday through Friday or on weekends              Form/Paperwork Completion:            Please note a $25 fee may incur for all paperwork for completed by our providers. We ask that you allow 7-10 business days. Pre-payment is due prior to picking up/faxing the completed form. You may also download your forms to [employer] to have your doctor print off.
No

## 2023-09-20 DIAGNOSIS — M17.10 UNILATERAL PRIMARY OSTEOARTHRITIS, UNSPECIFIED KNEE: ICD-10-CM

## 2023-09-20 RX ORDER — MELOXICAM 15 MG/1
TABLET ORAL
Qty: 30 TABLET | Refills: 2 | Status: SHIPPED | OUTPATIENT
Start: 2023-09-20

## 2023-09-21 DIAGNOSIS — I10 ESSENTIAL (PRIMARY) HYPERTENSION: ICD-10-CM

## 2023-09-21 RX ORDER — METOPROLOL SUCCINATE 25 MG/1
25 TABLET, EXTENDED RELEASE ORAL DAILY
Qty: 90 TABLET | Refills: 2 | Status: SHIPPED | OUTPATIENT
Start: 2023-09-21

## 2023-09-27 ENCOUNTER — OFFICE VISIT (OUTPATIENT)
Age: 68
End: 2023-09-27
Payer: MEDICARE

## 2023-09-27 VITALS
SYSTOLIC BLOOD PRESSURE: 189 MMHG | BODY MASS INDEX: 47.36 KG/M2 | WEIGHT: 241.2 LBS | HEART RATE: 74 BPM | HEIGHT: 60 IN | DIASTOLIC BLOOD PRESSURE: 74 MMHG

## 2023-09-27 DIAGNOSIS — I10 ESSENTIAL HYPERTENSION: ICD-10-CM

## 2023-09-27 DIAGNOSIS — E16.2 HYPOGLYCEMIA: Primary | ICD-10-CM

## 2023-09-27 PROCEDURE — 1123F ACP DISCUSS/DSCN MKR DOCD: CPT | Performed by: GENERAL ACUTE CARE HOSPITAL

## 2023-09-27 PROCEDURE — 1090F PRES/ABSN URINE INCON ASSESS: CPT | Performed by: GENERAL ACUTE CARE HOSPITAL

## 2023-09-27 PROCEDURE — 99204 OFFICE O/P NEW MOD 45 MIN: CPT | Performed by: GENERAL ACUTE CARE HOSPITAL

## 2023-09-27 PROCEDURE — 1036F TOBACCO NON-USER: CPT | Performed by: GENERAL ACUTE CARE HOSPITAL

## 2023-09-27 PROCEDURE — G8427 DOCREV CUR MEDS BY ELIG CLIN: HCPCS | Performed by: GENERAL ACUTE CARE HOSPITAL

## 2023-09-27 PROCEDURE — 3077F SYST BP >= 140 MM HG: CPT | Performed by: GENERAL ACUTE CARE HOSPITAL

## 2023-09-27 PROCEDURE — 3017F COLORECTAL CA SCREEN DOC REV: CPT | Performed by: GENERAL ACUTE CARE HOSPITAL

## 2023-09-27 PROCEDURE — 3078F DIAST BP <80 MM HG: CPT | Performed by: GENERAL ACUTE CARE HOSPITAL

## 2023-09-27 PROCEDURE — G8417 CALC BMI ABV UP PARAM F/U: HCPCS | Performed by: GENERAL ACUTE CARE HOSPITAL

## 2023-09-27 PROCEDURE — G8399 PT W/DXA RESULTS DOCUMENT: HCPCS | Performed by: GENERAL ACUTE CARE HOSPITAL

## 2023-09-27 NOTE — PROGRESS NOTES
[facility]  DR ONELIA Wilsonivory is a 70 y.o. female  has a past medical history of HTN (hypertension), Sleep apnea, and Sleep apnea, obstructive. Presents for : Hypoglycemia      ASSESSMENT AND PLAN:     Hypoglycemia    Today we discussed the difference between true symptomatic hypoglycemia vs asymptomatic borderline blood sugars. We also discussed the various potential causes of low blood sugars including medication induced hypoglycemia, primary pancreatic diseases, autoimmunity, and renal/hepatic dysfunction, and glycogen storage diseases though more rare, and reactive hypoglycemia associated with carbohydrates consumed. We have decided that based on today's evaluation we would plan the following:    Patient has documented hypoglycemic episodes of hypoglycemia in lab draws repeatedly. For this reason, we will next evaluate for the cause of hypoglycemia by obtaining relevant labs during a hypoglycemic episode in order to determine if it is an insulin-mediated process. If proven to be insulin-mediated, then localization studies of the pancreas will be necessary to identify any potential pancreatic masses that could be present. Labs will include: CMP, insulin, pro-insulin, c-peptide, b-oh butyriate, and insulin antibodies.      Hypertension   She is following with PCP, on 3 anti-Hypertensive medications, advised to take hydrochlorothiazide in the morning, she has blood pressure kit at home, and she will contact her PCP to let them know about her elevated readings, she had irbesartan recently increased  ----------------------------------------------------------------------------------------------    According to the patient, symptoms started last year, she was doing a ketodiet and had episodes of \"blacking out\", she saw cardiologist and had negative workup and was found to have low blood sugar in lab test done, says despite blood sugar being 30s she was able

## 2023-09-27 NOTE — PATIENT INSTRUCTIONS
On 10/12/2023 Thursday please complete the labs as follows: Fast from 8 PM the night before and come to the lab to have blood drawn at 8:00 AM  Then continue fasting until (a) you have low sugar symptoms and you check with your glucometer/CGM and it is below 70 then ask the lab to draw your blood tests then or (b) draw your labs at 4:15 PM    PS: Please make sure that a family member is the one that is driving you to and from the appointment. Based on the results we will decide on the next steps in management. Follow up in 10/26/2023 to discuss the results.

## 2023-10-04 ENCOUNTER — TELEPHONE (OUTPATIENT)
Age: 68
End: 2023-10-04

## 2023-10-04 DIAGNOSIS — E16.2 HYPOGLYCEMIA: Primary | ICD-10-CM

## 2023-10-04 NOTE — TELEPHONE ENCOUNTER
Patient left a voice message for Dr. Bang Borges stating that she needs to know what to do when she has a Hypoglycemic episode. She stated that her sugar is still dropping during the middle of the day and yesterday it dropped to 57.   Please advise

## 2023-10-06 NOTE — TELEPHONE ENCOUNTER
Spoke with  ms Mendez, advised to eat every 3-4 hours and to avoid high glycemic carbs and to add more protein with carbs, to avoid drinking fluids when eating and avoid Etoh and caffeine, and will refer to dietician at this time, she indicates understanding and plan to have her complete hypoglycemia lab testing as we discussed during her visit

## 2023-10-12 DIAGNOSIS — E16.2 HYPOGLYCEMIA: ICD-10-CM

## 2023-10-13 ENCOUNTER — TELEPHONE (OUTPATIENT)
Age: 68
End: 2023-10-13

## 2023-10-13 ENCOUNTER — NURSE ONLY (OUTPATIENT)
Age: 68
End: 2023-10-13

## 2023-10-13 LAB
ALBUMIN SERPL-MCNC: 3.8 G/DL (ref 3.5–5)
ALBUMIN/GLOB SERPL: 1.2 (ref 1.1–2.2)
ALP SERPL-CCNC: 107 U/L (ref 45–117)
ALT SERPL-CCNC: 22 U/L (ref 12–78)
ANION GAP SERPL CALC-SCNC: 4 MMOL/L (ref 5–15)
AST SERPL-CCNC: 17 U/L (ref 15–37)
B-OH-BUTYR SERPL-SCNC: 0.09 MMOL/L
BILIRUB SERPL-MCNC: 0.4 MG/DL (ref 0.2–1)
BUN SERPL-MCNC: 22 MG/DL (ref 6–20)
BUN/CREAT SERPL: 24 (ref 12–20)
CALCIUM SERPL-MCNC: 9.5 MG/DL (ref 8.5–10.1)
CHLORIDE SERPL-SCNC: 104 MMOL/L (ref 97–108)
CO2 SERPL-SCNC: 30 MMOL/L (ref 21–32)
CREAT SERPL-MCNC: 0.9 MG/DL (ref 0.55–1.02)
GLOBULIN SER CALC-MCNC: 3.3 G/DL (ref 2–4)
GLUCOSE SERPL-MCNC: 111 MG/DL (ref 65–100)
GLUCOSE SERPL-MCNC: 74 MG/DL (ref 65–100)
POTASSIUM SERPL-SCNC: 4 MMOL/L (ref 3.5–5.1)
PROT SERPL-MCNC: 7.1 G/DL (ref 6.4–8.2)
SODIUM SERPL-SCNC: 138 MMOL/L (ref 136–145)

## 2023-10-13 NOTE — TELEPHONE ENCOUNTER
Patient came in on yesterday fasting for observation per Dr. Jamie Chilel.  Patient stated that she had been fasting since 8 pm.      10/12/23  8:39 am 89  10:00 am 86  2:00 pm 57/  Dr. Jamie Chilel was notified

## 2023-10-14 LAB — C PEPTIDE SERPL-MCNC: 2.3 NG/ML (ref 1.1–4.4)

## 2023-10-16 ENCOUNTER — OFFICE VISIT (OUTPATIENT)
Age: 68
End: 2023-10-16
Payer: MEDICARE

## 2023-10-16 VITALS
OXYGEN SATURATION: 98 % | WEIGHT: 244.8 LBS | TEMPERATURE: 97 F | HEIGHT: 60 IN | HEART RATE: 69 BPM | RESPIRATION RATE: 18 BRPM | BODY MASS INDEX: 48.06 KG/M2 | DIASTOLIC BLOOD PRESSURE: 78 MMHG | SYSTOLIC BLOOD PRESSURE: 174 MMHG

## 2023-10-16 DIAGNOSIS — Z00.00 MEDICARE ANNUAL WELLNESS VISIT, SUBSEQUENT: Primary | ICD-10-CM

## 2023-10-16 DIAGNOSIS — I10 ESSENTIAL (PRIMARY) HYPERTENSION: ICD-10-CM

## 2023-10-16 DIAGNOSIS — E66.01 MORBID OBESITY WITH BODY MASS INDEX OF 45.0-49.9 IN ADULT (HCC): ICD-10-CM

## 2023-10-16 DIAGNOSIS — E16.2 HYPOGLYCEMIA: ICD-10-CM

## 2023-10-16 LAB — PROINSULIN SERPL-SCNC: 38.7 PMOL/L (ref 0–10)

## 2023-10-16 PROCEDURE — 3078F DIAST BP <80 MM HG: CPT | Performed by: INTERNAL MEDICINE

## 2023-10-16 PROCEDURE — 3077F SYST BP >= 140 MM HG: CPT | Performed by: INTERNAL MEDICINE

## 2023-10-16 PROCEDURE — G0439 PPPS, SUBSEQ VISIT: HCPCS | Performed by: INTERNAL MEDICINE

## 2023-10-16 PROCEDURE — G8484 FLU IMMUNIZE NO ADMIN: HCPCS | Performed by: INTERNAL MEDICINE

## 2023-10-16 PROCEDURE — 1123F ACP DISCUSS/DSCN MKR DOCD: CPT | Performed by: INTERNAL MEDICINE

## 2023-10-16 PROCEDURE — 3017F COLORECTAL CA SCREEN DOC REV: CPT | Performed by: INTERNAL MEDICINE

## 2023-10-16 RX ORDER — HYDROCHLOROTHIAZIDE 25 MG/1
25 TABLET ORAL EVERY MORNING
Qty: 90 TABLET | Refills: 1 | Status: SHIPPED | OUTPATIENT
Start: 2023-10-16

## 2023-10-16 SDOH — ECONOMIC STABILITY: INCOME INSECURITY: HOW HARD IS IT FOR YOU TO PAY FOR THE VERY BASICS LIKE FOOD, HOUSING, MEDICAL CARE, AND HEATING?: NOT HARD AT ALL

## 2023-10-16 SDOH — ECONOMIC STABILITY: FOOD INSECURITY: WITHIN THE PAST 12 MONTHS, YOU WORRIED THAT YOUR FOOD WOULD RUN OUT BEFORE YOU GOT MONEY TO BUY MORE.: NEVER TRUE

## 2023-10-16 SDOH — ECONOMIC STABILITY: FOOD INSECURITY: WITHIN THE PAST 12 MONTHS, THE FOOD YOU BOUGHT JUST DIDN'T LAST AND YOU DIDN'T HAVE MONEY TO GET MORE.: NEVER TRUE

## 2023-10-16 ASSESSMENT — PATIENT HEALTH QUESTIONNAIRE - PHQ9
1. LITTLE INTEREST OR PLEASURE IN DOING THINGS: 0
SUM OF ALL RESPONSES TO PHQ9 QUESTIONS 1 & 2: 2
SUM OF ALL RESPONSES TO PHQ QUESTIONS 1-9: 2
SUM OF ALL RESPONSES TO PHQ9 QUESTIONS 1 & 2: 0
SUM OF ALL RESPONSES TO PHQ QUESTIONS 1-9: 0
2. FEELING DOWN, DEPRESSED OR HOPELESS: 0
1. LITTLE INTEREST OR PLEASURE IN DOING THINGS: 0
SUM OF ALL RESPONSES TO PHQ QUESTIONS 1-9: 0
SUM OF ALL RESPONSES TO PHQ QUESTIONS 1-9: 0
SUM OF ALL RESPONSES TO PHQ9 QUESTIONS 1 & 2: 0
SUM OF ALL RESPONSES TO PHQ QUESTIONS 1-9: 2
2. FEELING DOWN, DEPRESSED OR HOPELESS: 0
SUM OF ALL RESPONSES TO PHQ QUESTIONS 1-9: 0
1. LITTLE INTEREST OR PLEASURE IN DOING THINGS: 1
SUM OF ALL RESPONSES TO PHQ QUESTIONS 1-9: 0
SUM OF ALL RESPONSES TO PHQ QUESTIONS 1-9: 2
2. FEELING DOWN, DEPRESSED OR HOPELESS: 1
SUM OF ALL RESPONSES TO PHQ QUESTIONS 1-9: 0
SUM OF ALL RESPONSES TO PHQ QUESTIONS 1-9: 0
SUM OF ALL RESPONSES TO PHQ QUESTIONS 1-9: 2
SUM OF ALL RESPONSES TO PHQ QUESTIONS 1-9: 0

## 2023-10-16 ASSESSMENT — LIFESTYLE VARIABLES
HOW OFTEN DO YOU HAVE A DRINK CONTAINING ALCOHOL: MONTHLY OR LESS
HOW MANY STANDARD DRINKS CONTAINING ALCOHOL DO YOU HAVE ON A TYPICAL DAY: 1 OR 2

## 2023-10-16 NOTE — PROGRESS NOTES
Medicare Annual Wellness Visit    Thelma Bustamante is here for Medicare AWV    Assessment & Plan   Medicare annual wellness visit, subsequent  Essential (primary) hypertension not at goal increase   -     hydroCHLOROthiazide (HYDRODIURIL) 25 MG tablet; Take 1 tablet by mouth every morning, Disp-90 tablet, R-1Normal  Continue  avapro 300mg and toprol 25mg   Discussed importance of compliancy, DASH diet and regular exercise    Morbid obesity with body mass index of 45.0-49.9 in adult ([address])  -     [address] [facility] [address] - [place] Santana, RDN, [facility] OP Nutrition, Nutrition Services, [place]  Hypoglycemia- worked up by Dr Delmy Wyatt and has follow up next week   Patient is frustrated with weight gain attributes to diet to keep sugars up  -     [address] [facility], [place], [place], [facility] Nutrition, Nutrition Services, [address]  Recommendations for Preventive Services Due: see orders and patient instructions/AVS.  Recommended screening schedule for the next 5-10 years is provided to the patient in written form: see Patient Instructions/AVS.     Return in 6 months (on 4/16/2024). Subjective     Ms. Thelma Bustamante   is a 70 y.o. female with a hx of morbid obesity, resistant HTN presenting to follow up    Patient saw Dr Delmy Wyatt for hypoglycemia and reviewed note 09/27/2023  CMP, insulin, pro-insulin, c-peptide, b-oh butyriate, and insulin antibodies. she wore reji 3 and \" signals were going off non stop\"    HTN: BP persistently elevated          She has mild cardiomyopathy likely due to long standing HTN  She saw cards Dr Dane Navarro on 10/07/2022       Cardiomyopathy: She does seem to have mild cardiomyopathy with ejection fraction of 45 to 50%. Etiology of this is unclear. Seemingly relatively asymptomatic at this time.       Negative nuclear test for ischemia     Current  meds currently on and avapro 300mg and metoprolol 25mg XL and HCTZ 12.5mg S( stopped amlodipine due to  swelling in legs)   Taking lasix 20mg in AM  BP is high today again   She

## 2023-10-16 NOTE — PROGRESS NOTES
1. \"Have you been to the ER, urgent care clinic since your last visit? Hospitalized since your last visit? \" No    2. \"Have you seen or consulted any other health care providers outside of the [address] [place] [address] since your last visit? \" No     3. For patients aged 43-73: Has the patient had a colonoscopy / FIT/ Cologuard? Yes - no Care Gap present      If the patient is female:    4. For patients aged 43-66: Has the patient had a mammogram within the past 2 years? Yes - no Care Gap present      5. For patients aged 21-65: Has the patient had a pap smear?  NA - based on age or sex

## 2023-10-16 NOTE — TELEPHONE ENCOUNTER
Patient asked to go next door to have labs drawn when her blood sugar reached 57 and advised to eat something right after and to have someone with her to help her get home, patient indicates understanding and agrees with plan.

## 2023-10-18 LAB
C PEPTIDE SERPL-MCNC: 2.3 NG/ML (ref 1.1–4.4)
INSULIN SERPL-ACNC: 8.2 UIU/ML (ref 2.6–24.9)

## 2023-10-25 LAB — INSULIN AB SER-ACNC: <5 UU/ML

## 2023-10-26 ENCOUNTER — OFFICE VISIT (OUTPATIENT)
Age: 68
End: 2023-10-26
Payer: MEDICARE

## 2023-10-26 VITALS
HEIGHT: 60 IN | SYSTOLIC BLOOD PRESSURE: 179 MMHG | RESPIRATION RATE: 14 BRPM | DIASTOLIC BLOOD PRESSURE: 88 MMHG | WEIGHT: 241.8 LBS | HEART RATE: 64 BPM | BODY MASS INDEX: 47.47 KG/M2

## 2023-10-26 DIAGNOSIS — R73.03 PREDIABETES: ICD-10-CM

## 2023-10-26 DIAGNOSIS — E16.2 HYPOGLYCEMIA: Primary | ICD-10-CM

## 2023-10-26 PROCEDURE — 99214 OFFICE O/P EST MOD 30 MIN: CPT | Performed by: GENERAL ACUTE CARE HOSPITAL

## 2023-10-26 PROCEDURE — G8484 FLU IMMUNIZE NO ADMIN: HCPCS | Performed by: GENERAL ACUTE CARE HOSPITAL

## 2023-10-26 PROCEDURE — G8417 CALC BMI ABV UP PARAM F/U: HCPCS | Performed by: GENERAL ACUTE CARE HOSPITAL

## 2023-10-26 PROCEDURE — G8427 DOCREV CUR MEDS BY ELIG CLIN: HCPCS | Performed by: GENERAL ACUTE CARE HOSPITAL

## 2023-10-26 PROCEDURE — 3017F COLORECTAL CA SCREEN DOC REV: CPT | Performed by: GENERAL ACUTE CARE HOSPITAL

## 2023-10-26 PROCEDURE — 1036F TOBACCO NON-USER: CPT | Performed by: GENERAL ACUTE CARE HOSPITAL

## 2023-10-26 PROCEDURE — 3077F SYST BP >= 140 MM HG: CPT | Performed by: GENERAL ACUTE CARE HOSPITAL

## 2023-10-26 PROCEDURE — 1123F ACP DISCUSS/DSCN MKR DOCD: CPT | Performed by: GENERAL ACUTE CARE HOSPITAL

## 2023-10-26 PROCEDURE — G8399 PT W/DXA RESULTS DOCUMENT: HCPCS | Performed by: GENERAL ACUTE CARE HOSPITAL

## 2023-10-26 PROCEDURE — 3079F DIAST BP 80-89 MM HG: CPT | Performed by: GENERAL ACUTE CARE HOSPITAL

## 2023-10-26 PROCEDURE — 1090F PRES/ABSN URINE INCON ASSESS: CPT | Performed by: GENERAL ACUTE CARE HOSPITAL

## 2023-10-26 RX ORDER — LANCETS
EACH MISCELLANEOUS
Qty: 50 EACH | Refills: 1 | Status: SHIPPED | OUTPATIENT
Start: 2023-10-26

## 2023-10-26 RX ORDER — BLOOD-GLUCOSE METER
EACH MISCELLANEOUS
Qty: 1 KIT | Refills: 0 | Status: SHIPPED | OUTPATIENT
Start: 2023-10-26

## 2023-10-26 RX ORDER — BLOOD SUGAR DIAGNOSTIC
STRIP MISCELLANEOUS
Qty: 50 EACH | Refills: 1 | Status: SHIPPED | OUTPATIENT
Start: 2023-10-26

## 2023-10-27 ENCOUNTER — TELEPHONE (OUTPATIENT)
Age: 68
End: 2023-10-27

## 2023-10-27 NOTE — TELEPHONE ENCOUNTER
Patient left  a voice message and stated that she has had a change of heart and she would like the Metformin sent to her pharmacy.

## 2023-11-01 RX ORDER — METFORMIN HYDROCHLORIDE 500 MG/1
500 TABLET, EXTENDED RELEASE ORAL
Qty: 90 TABLET | Refills: 1 | Status: SHIPPED | OUTPATIENT
Start: 2023-11-01

## 2023-11-06 ENCOUNTER — TELEPHONE (OUTPATIENT)
Age: 68
End: 2023-11-06

## 2023-11-06 DIAGNOSIS — I10 ESSENTIAL (PRIMARY) HYPERTENSION: Primary | ICD-10-CM

## 2023-11-06 NOTE — TELEPHONE ENCOUNTER
Pt states she is having a medication re action. Hydrochlorothiazide 25 mg  is giving her the problem. Side effects: scratching and itching, blurry vision and skin is very dry, peeling and flaking. Please call to advise as soon as possible. She stopped taking and it all went away.      On another new med from endocrinologist is metformin hcl 500 mg 1/day

## 2023-11-07 RX ORDER — CHLORTHALIDONE 25 MG/1
12.5 TABLET ORAL DAILY
Qty: 30 TABLET | Refills: 0 | Status: SHIPPED | OUTPATIENT
Start: 2023-11-07

## 2023-11-07 NOTE — TELEPHONE ENCOUNTER
Spoke to patient and scheduled an appointment on 11/27. Patient is requesting a call from Dr. Rocío Workman nurse, she would like to know what to do in the meantime.  Please advise

## 2023-11-09 ENCOUNTER — HOSPITAL ENCOUNTER (OUTPATIENT)
Facility: HOSPITAL | Age: 68
Setting detail: RECURRING SERIES
Discharge: HOME | End: 2023-11-12
Payer: MEDICARE

## 2023-11-09 PROCEDURE — 97802 MEDICAL NUTRITION INDIV IN: CPT | Performed by: DIETITIAN, REGISTERED

## 2023-11-09 SDOH — SOCIAL STABILITY: SOCIAL NETWORK: HOW OFTEN DO YOU ATTENT MEETINGS OF THE CLUB OR ORGANIZATION YOU BELONG TO?: NEVER

## 2023-11-09 SDOH — HEALTH STABILITY: MENTAL HEALTH: HOW MANY STANDARD DRINKS CONTAINING ALCOHOL DO YOU HAVE ON A TYPICAL DAY?: 1 OR 2

## 2023-11-09 SDOH — ECONOMIC STABILITY: INCOME INSECURITY: IN THE LAST 12 MONTHS, WAS THERE A TIME WHEN YOU WERE NOT ABLE TO PAY THE MORTGAGE OR RENT ON TIME?: NO

## 2023-11-09 SDOH — HEALTH STABILITY: MENTAL HEALTH
STRESS IS WHEN SOMEONE FEELS TENSE, NERVOUS, ANXIOUS, OR CAN'T SLEEP AT NIGHT BECAUSE THEIR MIND IS TROUBLED. HOW STRESSED ARE YOU?: TO SOME EXTENT

## 2023-11-09 SDOH — ECONOMIC STABILITY: FOOD INSECURITY: WITHIN THE PAST 12 MONTHS, YOU WORRIED THAT YOUR FOOD WOULD RUN OUT BEFORE YOU GOT MONEY TO BUY MORE.: NEVER TRUE

## 2023-11-09 SDOH — SOCIAL STABILITY: SOCIAL NETWORK
DO YOU BELONG TO ANY CLUBS OR ORGANIZATIONS SUCH AS CHURCH GROUPS UNIONS, FRATERNAL OR ATHLETIC GROUPS, OR SCHOOL GROUPS?: NO

## 2023-11-09 SDOH — ECONOMIC STABILITY: FOOD INSECURITY: WITHIN THE PAST 12 MONTHS, THE FOOD YOU BOUGHT JUST DIDN'T LAST AND YOU DIDN'T HAVE MONEY TO GET MORE.: NEVER TRUE

## 2023-11-09 SDOH — SOCIAL STABILITY: SOCIAL NETWORK: ARE YOU MARRIED, WIDOWED, DIVORCED, SEPARATED, NEVER MARRIED, OR LIVING WITH A PARTNER?: WIDOWED

## 2023-11-09 SDOH — SOCIAL STABILITY: SOCIAL NETWORK: HOW OFTEN DO YOU ATTEND CHURCH OR RELIGIOUS SERVICES?: MORE THAN 4 TIMES PER YEAR

## 2023-11-09 SDOH — ECONOMIC STABILITY: INCOME INSECURITY: HOW HARD IS IT FOR YOU TO PAY FOR THE VERY BASICS LIKE FOOD, HOUSING, MEDICAL CARE, AND HEATING?: SOMEWHAT HARD

## 2023-11-09 SDOH — SOCIAL STABILITY: SOCIAL NETWORK: HOW OFTEN DO YOU GET TOGETHER WITH FRIENDS OR RELATIVES?: NEVER

## 2023-11-09 SDOH — HEALTH STABILITY: MENTAL HEALTH: HOW OFTEN DO YOU HAVE A DRINK CONTAINING ALCOHOL?: 2-4 TIMES A MONTH

## 2023-11-09 SDOH — HEALTH STABILITY: PHYSICAL HEALTH: ON AVERAGE, HOW MANY DAYS PER WEEK DO YOU ENGAGE IN MODERATE TO STRENUOUS EXERCISE (LIKE A BRISK WALK)?: 2 DAYS

## 2023-11-09 SDOH — SOCIAL STABILITY: SOCIAL NETWORK
IN A TYPICAL WEEK, HOW MANY TIMES DO YOU TALK ON THE PHONE WITH FAMILY, FRIENDS, OR NEIGHBORS?: MORE THAN THREE TIMES A WEEK

## 2023-11-09 SDOH — HEALTH STABILITY: PHYSICAL HEALTH: ON AVERAGE, HOW MANY MINUTES DO YOU ENGAGE IN EXERCISE AT THIS LEVEL?: 30 MIN

## 2023-11-09 SDOH — ECONOMIC STABILITY: HOUSING INSECURITY: IN THE LAST 12 MONTHS, HOW MANY PLACES HAVE YOU LIVED?: 1

## 2023-11-09 NOTE — PROGRESS NOTES
Notes recent wheezing issues that she is concerned she is having fluid in her lungs. Has not addressed this with PCP yet. No wheezing currently. Pt not currently checking BP at home. Activity: walking with a cane. Normally going to the pool but currently not heated. Blood pressure control has been an issue as well. Currently having financial issues due to a bad . This is affecting food purchasing. Does not like or want to keep a food log. Notes usually not remembering to keep it. Food & Nutrition: Reports Currently following a low sodium diet. Poor sleep. Using CPAP daily. Notes she has seen recent weight gain and has already reduced her intake without results. Pt believes much of this weight is fluid retention. Stopped keto diet in 2022 after seeing Dr. Briseyda Laird after being told her had very low blood sugars (30s). Typically episodes of low blood sugars between 1-4pm.   After stopping keto added in foods like oatmeal, fruit, and some added sugars, juice. Still having lower blood sugars but no longer blacking out. Seen by Endocrinologist recently. Notes reviewed. Currently seeing weight gain (some fluid related). Does not use measuring cups but aware of measurements. B- oatmeal (1/2 cup made with water, 10.5-1tbsp sugar , blueberries), 2 strips of turkey solitario. Instructed to add an egg. Added but feeling very full. OR eggs with spinach and mushrooms. S- not hungry  L- can of tuna with olive oil. Sometimes with cooked greens 1/2 cup or small salad (lettuce, tomato, red onion, carrots, green peppers, skinny girl dressing). If having leftover chicken may eat that instead of tuna. Eating tuna 2-3 days per week. [employer] snack pack (1 per day as either a lunch or a snack)  S- if not feeling like eating will eat a [employer] protein shake with 26g protein. Sometimes an apple or kiwi.    D- chicken/fish/baked pork shop (palm of her hand) +

## 2023-11-27 ENCOUNTER — OFFICE VISIT (OUTPATIENT)
Age: 68
End: 2023-11-27
Payer: MEDICARE

## 2023-11-27 VITALS
RESPIRATION RATE: 18 BRPM | OXYGEN SATURATION: 97 % | DIASTOLIC BLOOD PRESSURE: 75 MMHG | HEART RATE: 72 BPM | SYSTOLIC BLOOD PRESSURE: 159 MMHG | WEIGHT: 241.8 LBS | BODY MASS INDEX: 47.47 KG/M2 | TEMPERATURE: 97.4 F | HEIGHT: 60 IN

## 2023-11-27 DIAGNOSIS — I10 ESSENTIAL (PRIMARY) HYPERTENSION: Primary | ICD-10-CM

## 2023-11-27 DIAGNOSIS — E66.01 MORBID OBESITY WITH BODY MASS INDEX OF 45.0-49.9 IN ADULT (HCC): ICD-10-CM

## 2023-11-27 PROCEDURE — 1090F PRES/ABSN URINE INCON ASSESS: CPT | Performed by: INTERNAL MEDICINE

## 2023-11-27 PROCEDURE — 99214 OFFICE O/P EST MOD 30 MIN: CPT | Performed by: INTERNAL MEDICINE

## 2023-11-27 PROCEDURE — 1123F ACP DISCUSS/DSCN MKR DOCD: CPT | Performed by: INTERNAL MEDICINE

## 2023-11-27 PROCEDURE — 3078F DIAST BP <80 MM HG: CPT | Performed by: INTERNAL MEDICINE

## 2023-11-27 PROCEDURE — G8484 FLU IMMUNIZE NO ADMIN: HCPCS | Performed by: INTERNAL MEDICINE

## 2023-11-27 PROCEDURE — G8399 PT W/DXA RESULTS DOCUMENT: HCPCS | Performed by: INTERNAL MEDICINE

## 2023-11-27 PROCEDURE — 3017F COLORECTAL CA SCREEN DOC REV: CPT | Performed by: INTERNAL MEDICINE

## 2023-11-27 PROCEDURE — G8427 DOCREV CUR MEDS BY ELIG CLIN: HCPCS | Performed by: INTERNAL MEDICINE

## 2023-11-27 PROCEDURE — 3077F SYST BP >= 140 MM HG: CPT | Performed by: INTERNAL MEDICINE

## 2023-11-27 PROCEDURE — G8417 CALC BMI ABV UP PARAM F/U: HCPCS | Performed by: INTERNAL MEDICINE

## 2023-11-27 PROCEDURE — 1036F TOBACCO NON-USER: CPT | Performed by: INTERNAL MEDICINE

## 2023-11-27 RX ORDER — CHLORTHALIDONE 25 MG/1
12.5 TABLET ORAL DAILY
Qty: 45 TABLET | Refills: 5 | Status: SHIPPED | OUTPATIENT
Start: 2023-11-27

## 2023-11-27 SDOH — ECONOMIC STABILITY: FOOD INSECURITY: WITHIN THE PAST 12 MONTHS, YOU WORRIED THAT YOUR FOOD WOULD RUN OUT BEFORE YOU GOT MONEY TO BUY MORE.: NEVER TRUE

## 2023-11-27 SDOH — ECONOMIC STABILITY: INCOME INSECURITY: HOW HARD IS IT FOR YOU TO PAY FOR THE VERY BASICS LIKE FOOD, HOUSING, MEDICAL CARE, AND HEATING?: NOT HARD AT ALL

## 2023-11-27 SDOH — ECONOMIC STABILITY: FOOD INSECURITY: WITHIN THE PAST 12 MONTHS, THE FOOD YOU BOUGHT JUST DIDN'T LAST AND YOU DIDN'T HAVE MONEY TO GET MORE.: NEVER TRUE

## 2023-11-27 ASSESSMENT — PATIENT HEALTH QUESTIONNAIRE - PHQ9
SUM OF ALL RESPONSES TO PHQ QUESTIONS 1-9: 0
SUM OF ALL RESPONSES TO PHQ9 QUESTIONS 1 & 2: 0
1. LITTLE INTEREST OR PLEASURE IN DOING THINGS: 0
SUM OF ALL RESPONSES TO PHQ QUESTIONS 1-9: 0
2. FEELING DOWN, DEPRESSED OR HOPELESS: 0
SUM OF ALL RESPONSES TO PHQ QUESTIONS 1-9: 0
SUM OF ALL RESPONSES TO PHQ QUESTIONS 1-9: 0

## 2023-11-28 LAB
ANION GAP SERPL CALC-SCNC: 0 MMOL/L (ref 5–15)
BUN SERPL-MCNC: 19 MG/DL (ref 6–20)
BUN/CREAT SERPL: 20 (ref 12–20)
CALCIUM SERPL-MCNC: 9.3 MG/DL (ref 8.5–10.1)
CHLORIDE SERPL-SCNC: 105 MMOL/L (ref 97–108)
CO2 SERPL-SCNC: 34 MMOL/L (ref 21–32)
CREAT SERPL-MCNC: 0.93 MG/DL (ref 0.55–1.02)
GLUCOSE SERPL-MCNC: 90 MG/DL (ref 65–100)
POTASSIUM SERPL-SCNC: 4.2 MMOL/L (ref 3.5–5.1)
SODIUM SERPL-SCNC: 139 MMOL/L (ref 136–145)

## 2023-12-01 ENCOUNTER — OFFICE VISIT (OUTPATIENT)
Age: 68
End: 2023-12-01
Payer: MEDICARE

## 2023-12-01 VITALS
DIASTOLIC BLOOD PRESSURE: 96 MMHG | HEIGHT: 60 IN | BODY MASS INDEX: 46.53 KG/M2 | OXYGEN SATURATION: 99 % | HEART RATE: 74 BPM | WEIGHT: 237 LBS | SYSTOLIC BLOOD PRESSURE: 180 MMHG

## 2023-12-01 DIAGNOSIS — I10 ESSENTIAL (PRIMARY) HYPERTENSION: ICD-10-CM

## 2023-12-01 DIAGNOSIS — R55 SYNCOPE AND COLLAPSE: Primary | ICD-10-CM

## 2023-12-01 PROCEDURE — 1090F PRES/ABSN URINE INCON ASSESS: CPT | Performed by: SPECIALIST

## 2023-12-01 PROCEDURE — 99214 OFFICE O/P EST MOD 30 MIN: CPT | Performed by: SPECIALIST

## 2023-12-01 PROCEDURE — 3077F SYST BP >= 140 MM HG: CPT | Performed by: SPECIALIST

## 2023-12-01 PROCEDURE — 3017F COLORECTAL CA SCREEN DOC REV: CPT | Performed by: SPECIALIST

## 2023-12-01 PROCEDURE — G8484 FLU IMMUNIZE NO ADMIN: HCPCS | Performed by: SPECIALIST

## 2023-12-01 PROCEDURE — 93010 ELECTROCARDIOGRAM REPORT: CPT | Performed by: SPECIALIST

## 2023-12-01 PROCEDURE — G8427 DOCREV CUR MEDS BY ELIG CLIN: HCPCS | Performed by: SPECIALIST

## 2023-12-01 PROCEDURE — 1036F TOBACCO NON-USER: CPT | Performed by: SPECIALIST

## 2023-12-01 PROCEDURE — 3080F DIAST BP >= 90 MM HG: CPT | Performed by: SPECIALIST

## 2023-12-01 PROCEDURE — G8417 CALC BMI ABV UP PARAM F/U: HCPCS | Performed by: SPECIALIST

## 2023-12-01 PROCEDURE — 93005 ELECTROCARDIOGRAM TRACING: CPT | Performed by: SPECIALIST

## 2023-12-01 PROCEDURE — G8399 PT W/DXA RESULTS DOCUMENT: HCPCS | Performed by: SPECIALIST

## 2023-12-01 PROCEDURE — 1123F ACP DISCUSS/DSCN MKR DOCD: CPT | Performed by: SPECIALIST

## 2023-12-01 RX ORDER — METOPROLOL SUCCINATE 50 MG/1
50 TABLET, EXTENDED RELEASE ORAL
Qty: 90 TABLET | Refills: 1 | Status: SHIPPED | OUTPATIENT
Start: 2023-12-01

## 2023-12-01 ASSESSMENT — PATIENT HEALTH QUESTIONNAIRE - PHQ9
SUM OF ALL RESPONSES TO PHQ QUESTIONS 1-9: 0
SUM OF ALL RESPONSES TO PHQ9 QUESTIONS 1 & 2: 0
2. FEELING DOWN, DEPRESSED OR HOPELESS: 0
1. LITTLE INTEREST OR PLEASURE IN DOING THINGS: 0
SUM OF ALL RESPONSES TO PHQ QUESTIONS 1-9: 0

## 2023-12-01 NOTE — PATIENT INSTRUCTIONS
Increase toprol to 50 mg nightly. Record blood pressure/heart rate daily. Remember to sit for at least 3 minutes. Have both feet flat on floor and arm at heart level. Call office or send readings via [employer] in 1 week. Follow up with Dr. Jun Urbano in 4 months.

## 2023-12-01 NOTE — PROGRESS NOTES
[address] [facility]                                                            OFFICE NOTE        Joao Sahu M.D.,BERHANE Khris Pulido   1955  [id number]    Date/Time:  12/1/20231:03 PM            SUBJECTIVE:  She is feeling okay very stressed about financial issues and family    She has some itching with hydrochlorothiazide she was started on chlorthalidone half dose she tells me whenever she goes up on the dose of chlorthalidone may develop itching again. No particular chest pain. The only chest discomfort she has at the level of the sternum only when she pushes on it. No worsening shortness of breath. Assessment/Plan    1. Cardiomyopathy: We have discussed the results of echocardiogram January thousand 23 with normal ejection fraction of 55 to 60%. Clinically she seems compensated at this time. In December 2022 nuclear stress test with no clear evidence for ischemia and an estimated normal ejection fraction at 71%. This was discussed with the patient. Nonetheless is aware limitation of nuclear stress testing evaluated ejection fraction. For now continue Toprol and Avapro. No recurrent chest pain the chest discomfort upon palpation is likely costochondritis. Repeat stress test on the back burner for now. Her EKG today reveals a normal sinus rhythm with minimal nonspecific T wave abnormalities. 2.  Hypertension: Her blood pressure seems to be not controlled at this time. We had conversation regarding different options. She does not want to start amlodipine because of the severe lower extremity edema and she does not want increase chlorthalidone, potential itching. Ask her to start hydralazine but she wants to hold off at this point.     She is agreeable to start higher dose of Toprol 50 mg nightly she is aware of potential side effects but also the fact

## 2023-12-05 ENCOUNTER — HOSPITAL ENCOUNTER (OUTPATIENT)
Facility: HOSPITAL | Age: 68
Setting detail: RECURRING SERIES
Discharge: HOME | End: 2023-12-08
Payer: MEDICARE

## 2023-12-05 PROCEDURE — 97803 MED NUTRITION INDIV SUBSEQ: CPT | Performed by: DIETITIAN, REGISTERED

## 2023-12-05 NOTE — PROGRESS NOTES
NUTRITION - FOLLOW-UP TREATMENT NOTE  Patient Name: Ori Palacio         Date: 2023  : 1955    YES Patient  Verified  Diagnosis:   E66.01, Z68.42 (ICD-10-CM) - Morbid obesity with body mass index of 45.0-49.9 in adult (HCC)   E16.2 (ICD-10-CM) - Hypoglycemia      In time:   2:30pm             Out time:   3:15pm   Total Treatment Time (min):   45     SUBJECTIVE/ASSESSMENT  Current Wt: 239.6 Previous Wt: 243.8 (est dry 239)  Wt Change: -4.2     Initial Wt: 243.8 Total Wt change: -4.2 Height: 60     Changes in medication or medical history? Any new allergies, surgeries or procedures? Yes    If yes, update Summary List   Current Outpatient Medications   Medication Instructions    Accu-Chek FastClix Lancets MISC Use to check blood sugar 1 times per day as needed E16.2    ACCU-CHEK GUIDE strip Use to check blood sugar 1 times per day as needed E16.2    acetaminophen (TYLENOL) 650 MG extended release tablet Oral, DAILY    Biotin 1 MG CAPS as needed    chlorthalidone (HYGROTON) 12.5 mg, Oral, DAILY    Cholecalciferol (VITAMIN D3) 1.25 MG (13013 UT) CAPS Vitamin D3    Cobalamin Combinations (B-12) 100-5000 MCG SUBL as needed    Continuous Blood Gluc Sensor (FREESTYLE TANIA 2 SENSOR) MISC 1 each, Does not apply, EVERY 14 DAYS    Estradiol (VAGIFEM) 10 MCG TABS vaginal tablet Vaginal, TWICE WEEKLY    furosemide (LASIX) 20 MG tablet TAKE 1 TABLET BY MOUTH EVERY DAY AS NEEDED FOR SWELLING    irbesartan (AVAPRO) 300 MG tablet TAKE 1 TABLET BY MOUTH EVERY DAY    magnesium (MAGNESIUM-OXIDE) 250 MG TABS tablet 1 tablet, Oral, NIGHTLY    meloxicam (MOBIC) 15 MG tablet TAKE 1 TABLET BY MOUTH EVERY DAY AS NEEDED FOR PAIN    metFORMIN (GLUCOPHAGE-XR) 500 mg, Oral, DAILY WITH DINNER    metoprolol succinate (TOPROL XL) 50 mg, Oral, EVERY BEDTIME, (Dose increased to 50 mg nightly on 23.   Inform patient this is a 50 mg tablet)    Multiple Vitamins-Minerals (MULTIVITAMIN ADULT EXTRA C PO) multivitamin    Omega-3

## 2023-12-27 NOTE — PROGRESS NOTES
1. Have you been to the ER, urgent care clinic since your last visit? Hospitalized since your last visit? No    2. Have you seen or consulted any other health care providers outside of the [address] [place] [address] since your last visit? Include any pap smears or colon screening. Yes When: 07/01/2022 [place] on call hip pain        Progress Note: Weekly Education Class in the [facility] Weight Loss Program         Patient is on Very Low Calorie Diet [] (4 meal replacements per day, 800 kcal/day)      Low Calorie Diet [x] (2-3 meal replacements per day, 5863-4036 kcal/day)    1) Did patient have any new symptoms or physical problems? Yes [x]    No []    If yes, check & comment: weakness [], fatigue [], lightheadedness [], headache [], cramps [], cold intolerance [x], hair loss [], diarrhea [], constipation [],  NA [] other:                                 2) Has patient had any medical attention from other providers, urgent care or the emergency room this week? Yes []  No [x]       NA [], If yes, why:                                      3) Any other sugar sweetened beverages consumed this week? Yes []  No [x]    4) Did patient have any problems adhering to the diet? Yes []  No [x] NA []    If yes, Vacation [], Celebrations [], Conferences [], Family Reunions [] other:                                                5) How many hours of sleep this week?  7-8   (range)  NA []    Number of meal replacements consumed daily? 1-2 (range)  NA []    Average ounces of water patient consumed daily this week (not including shakes)? 49  (divide the weekly total by 7)    Did you eat any food outside of the program? Yes [x] No []    Physical Activity Over the Past Week:    Cardio exercise: n/a min  Strength exercise:   N/a workouts / week  Number of steps walked per day: n/a    How has patient mood overall been this week?  Sad [], Happy [], Stressed [], Tired [], Content [], NA [], other            Medications reconciled by nurse Yes [x]  No[]    Patient was given therapeutic recommendations for any noted side effects of their dietary approach based upon [facility] patient manual per providers recommendation. Progress Note: Weekly Education Class in the [facility] Weight Loss Program         Patient is on Very Low Calorie Diet [] (4 meal replacements per day, 800 kcal/day)      Low Calorie Diet [x] (2-3 meal replacements per day, 8104-5404 kcal/day)    1) Did patient have any new symptoms or physical problems? Yes [x]    No []    If yes, check & comment: weakness [], fatigue [], lightheadedness [], headache [], cramps [], cold intolerance [x], hair loss [], diarrhea [], constipation [],  NA [] other:                                 2) Has patient had any medical attention from other providers, urgent care or the emergency room this week? Yes [x]  No []       NA [], If yes, why:   [facility] hip hurting hard to walk                                  3) Any other sugar sweetened beverages consumed this week? Yes []  No [] no answer    4) Did patient have any problems adhering to the diet? Yes []  No [x] NA []    If yes, Vacation [], Celebrations [], Conferences [], Family Reunions [] other:                                               5) How many hours of sleep this week?  7.5-8  (range)  NA []    Number of meal replacements consumed daily? 2 (range)  NA []    Average ounces of water patient consumed daily this week (not including shakes)? 64     (divide the weekly total by 7)    Did you eat any food outside of the program? Yes [x] No []    Physical Activity Over the Past Week:    Cardio exercise: 2 min  Strength exercise: n/a workouts / week  Number of steps walked per day: n/a    How has patient mood overall been this week? Sad [], Happy [], Stressed [], Tired [], Content [], NA [], other   Tired.           Medications reconciled by nurse Yes [x]  No[]    Patient was given therapeutic recommendations for any noted side effects of their dietary approach based upon [facility] patient manual per providers recommendation. no

## 2024-02-15 DIAGNOSIS — I10 ESSENTIAL (PRIMARY) HYPERTENSION: ICD-10-CM

## 2024-02-15 RX ORDER — IRBESARTAN 300 MG/1
TABLET ORAL
Qty: 90 TABLET | Refills: 1 | Status: SHIPPED | OUTPATIENT
Start: 2024-02-15

## 2024-03-05 DIAGNOSIS — E16.2 HYPOGLYCEMIA: ICD-10-CM

## 2024-03-05 DIAGNOSIS — R73.03 PREDIABETES: Primary | ICD-10-CM

## 2024-04-08 ENCOUNTER — OFFICE VISIT (OUTPATIENT)
Age: 69
End: 2024-04-08
Payer: MEDICARE

## 2024-04-08 VITALS
HEIGHT: 60 IN | WEIGHT: 245 LBS | DIASTOLIC BLOOD PRESSURE: 80 MMHG | OXYGEN SATURATION: 97 % | BODY MASS INDEX: 48.1 KG/M2 | HEART RATE: 80 BPM | SYSTOLIC BLOOD PRESSURE: 130 MMHG | RESPIRATION RATE: 16 BRPM

## 2024-04-08 DIAGNOSIS — R55 SYNCOPE AND COLLAPSE: Primary | ICD-10-CM

## 2024-04-08 PROCEDURE — 99214 OFFICE O/P EST MOD 30 MIN: CPT | Performed by: SPECIALIST

## 2024-04-08 PROCEDURE — 3075F SYST BP GE 130 - 139MM HG: CPT | Performed by: SPECIALIST

## 2024-04-08 PROCEDURE — 93005 ELECTROCARDIOGRAM TRACING: CPT | Performed by: SPECIALIST

## 2024-04-08 PROCEDURE — 3079F DIAST BP 80-89 MM HG: CPT | Performed by: SPECIALIST

## 2024-04-08 PROCEDURE — 1036F TOBACCO NON-USER: CPT | Performed by: SPECIALIST

## 2024-04-08 PROCEDURE — G8399 PT W/DXA RESULTS DOCUMENT: HCPCS | Performed by: SPECIALIST

## 2024-04-08 PROCEDURE — 3017F COLORECTAL CA SCREEN DOC REV: CPT | Performed by: SPECIALIST

## 2024-04-08 PROCEDURE — 1123F ACP DISCUSS/DSCN MKR DOCD: CPT | Performed by: SPECIALIST

## 2024-04-08 PROCEDURE — G8427 DOCREV CUR MEDS BY ELIG CLIN: HCPCS | Performed by: SPECIALIST

## 2024-04-08 PROCEDURE — 93010 ELECTROCARDIOGRAM REPORT: CPT | Performed by: SPECIALIST

## 2024-04-08 PROCEDURE — G8417 CALC BMI ABV UP PARAM F/U: HCPCS | Performed by: SPECIALIST

## 2024-04-08 PROCEDURE — 1090F PRES/ABSN URINE INCON ASSESS: CPT | Performed by: SPECIALIST

## 2024-04-08 ASSESSMENT — PATIENT HEALTH QUESTIONNAIRE - PHQ9
SUM OF ALL RESPONSES TO PHQ QUESTIONS 1-9: 0
2. FEELING DOWN, DEPRESSED OR HOPELESS: NOT AT ALL
SUM OF ALL RESPONSES TO PHQ QUESTIONS 1-9: 0
1. LITTLE INTEREST OR PLEASURE IN DOING THINGS: NOT AT ALL
SUM OF ALL RESPONSES TO PHQ QUESTIONS 1-9: 0
SUM OF ALL RESPONSES TO PHQ QUESTIONS 1-9: 0
SUM OF ALL RESPONSES TO PHQ9 QUESTIONS 1 & 2: 0

## 2024-04-08 NOTE — PROGRESS NOTES
[facility]                                     DIVISION OF CARDIOLOGY                                                                             OFFICE NOTE                  KITTY VIRAMONTES M.D. , BERHANE            FADY PITTS   1955  [id number]    Date/Time:  4/8/20242:55 PM      /80   Pulse 80   Resp 16   Ht 1.524 m (5')   Wt 111.1 kg (245 lb)   SpO2 97%   BMI 47.85 kg/m²        Wt Readings from Last 3 Encounters:   04/08/24 111.1 kg (245 lb)   12/01/23 107.5 kg (237 lb)   11/27/23 109.7 kg (241 lb 12.8 oz)          Lab Results   Component Value Date    CHOL 221 (H) 05/11/2022    TRIG 93 05/11/2022    HDL 75 05/11/2022    LDLCALC 130 (H) 05/11/2022    VLDL 16 05/11/2022    CHOLHDLRATIO 2.4 09/03/2021              SUBJECTIVE:  Overall she is doing okay cardiac wise no chest pain or shortness of breath reported no palpitation presyncopal syncopal episodes.       Assessment/Plan  1.  Cardiomyopathy: We have discussed the results of echocardiogram January thousand 23 with normal ejection fraction of 55 to 60%.      Clinically she seems compensated at this time.    In December 2022 nuclear stress test with no clear evidence for ischemia and an estimated normal ejection fraction at 71%.    This was discussed with the patient.  Nonetheless is aware limitation of nuclear stress testing evaluated ejection fraction.    For now continue Toprol and Avapro.     No recurrent chest pain the chest discomfort upon palpation was likely costochondritis.     Repeat stress test on the back burner for now.  She will let me know if any recurrent symptoms.     Her EKG today reveals a normal sinus rhythm with minimal nonspecific T wave abnormalities.    2.  Hypertension: Well-controlled today continue same medication with no changes.      We had conversation regarding different options.  She does not want to start amlodipine because of the severe lower extremity edema and she does not

## 2024-04-10 DIAGNOSIS — M17.10 UNILATERAL PRIMARY OSTEOARTHRITIS, UNSPECIFIED KNEE: ICD-10-CM

## 2024-04-10 RX ORDER — MELOXICAM 15 MG/1
TABLET ORAL
Qty: 30 TABLET | Refills: 2 | Status: SHIPPED | OUTPATIENT
Start: 2024-04-10

## 2024-04-17 LAB — HBA1C MFR BLD: 5.2 % (ref 4.8–5.6)

## 2024-04-18 ENCOUNTER — OFFICE VISIT (OUTPATIENT)
Age: 69
End: 2024-04-18
Payer: MEDICARE

## 2024-04-18 VITALS
TEMPERATURE: 97.6 F | DIASTOLIC BLOOD PRESSURE: 88 MMHG | SYSTOLIC BLOOD PRESSURE: 138 MMHG | RESPIRATION RATE: 18 BRPM | WEIGHT: 244.8 LBS | BODY MASS INDEX: 48.06 KG/M2 | HEIGHT: 60 IN | HEART RATE: 62 BPM | OXYGEN SATURATION: 95 %

## 2024-04-18 DIAGNOSIS — E66.01 MORBID OBESITY WITH BODY MASS INDEX OF 45.0-49.9 IN ADULT (HCC): Primary | ICD-10-CM

## 2024-04-18 DIAGNOSIS — E88.819 INSULIN RESISTANCE: ICD-10-CM

## 2024-04-18 DIAGNOSIS — M17.0 PRIMARY OSTEOARTHRITIS OF BOTH KNEES: ICD-10-CM

## 2024-04-18 DIAGNOSIS — I10 ESSENTIAL HYPERTENSION: ICD-10-CM

## 2024-04-18 PROBLEM — N18.30 CHRONIC RENAL DISEASE, STAGE III (HCC): Status: RESOLVED | Noted: 2022-09-18 | Resolved: 2024-04-18

## 2024-04-18 LAB
ALBUMIN SERPL-MCNC: 4.2 G/DL (ref 3.9–4.9)
ALBUMIN/GLOB SERPL: 1.7 {RATIO} (ref 1.2–2.2)
ALP SERPL-CCNC: 100 IU/L (ref 44–121)
ALT SERPL-CCNC: 13 IU/L (ref 0–32)
AST SERPL-CCNC: 21 IU/L (ref 0–40)
BILIRUB SERPL-MCNC: 0.4 MG/DL (ref 0–1.2)
BUN SERPL-MCNC: 21 MG/DL (ref 8–27)
BUN/CREAT SERPL: 22 (ref 12–28)
CALCIUM SERPL-MCNC: 9.8 MG/DL (ref 8.7–10.3)
CHLORIDE SERPL-SCNC: 102 MMOL/L (ref 96–106)
CO2 SERPL-SCNC: 25 MMOL/L (ref 20–29)
CREAT SERPL-MCNC: 0.94 MG/DL (ref 0.57–1)
EGFRCR SERPLBLD CKD-EPI 2021: 66 ML/MIN/1.73
GLOBULIN SER CALC-MCNC: 2.5 G/DL (ref 1.5–4.5)
GLUCOSE SERPL-MCNC: 91 MG/DL (ref 70–99)
POTASSIUM SERPL-SCNC: 4.3 MMOL/L (ref 3.5–5.2)
PROT SERPL-MCNC: 6.7 G/DL (ref 6–8.5)
SODIUM SERPL-SCNC: 142 MMOL/L (ref 134–144)
TSH SERPL DL<=0.005 MIU/L-ACNC: 2.02 UIU/ML (ref 0.45–4.5)

## 2024-04-18 PROCEDURE — G8417 CALC BMI ABV UP PARAM F/U: HCPCS | Performed by: INTERNAL MEDICINE

## 2024-04-18 PROCEDURE — 3078F DIAST BP <80 MM HG: CPT | Performed by: INTERNAL MEDICINE

## 2024-04-18 PROCEDURE — 3017F COLORECTAL CA SCREEN DOC REV: CPT | Performed by: INTERNAL MEDICINE

## 2024-04-18 PROCEDURE — G8399 PT W/DXA RESULTS DOCUMENT: HCPCS | Performed by: INTERNAL MEDICINE

## 2024-04-18 PROCEDURE — G8427 DOCREV CUR MEDS BY ELIG CLIN: HCPCS | Performed by: INTERNAL MEDICINE

## 2024-04-18 PROCEDURE — 99214 OFFICE O/P EST MOD 30 MIN: CPT | Performed by: INTERNAL MEDICINE

## 2024-04-18 PROCEDURE — 1036F TOBACCO NON-USER: CPT | Performed by: INTERNAL MEDICINE

## 2024-04-18 PROCEDURE — 1123F ACP DISCUSS/DSCN MKR DOCD: CPT | Performed by: INTERNAL MEDICINE

## 2024-04-18 PROCEDURE — 3075F SYST BP GE 130 - 139MM HG: CPT | Performed by: INTERNAL MEDICINE

## 2024-04-18 PROCEDURE — 1090F PRES/ABSN URINE INCON ASSESS: CPT | Performed by: INTERNAL MEDICINE

## 2024-04-18 NOTE — PROGRESS NOTES
Chief Complaint   Patient presents with    Hypertension     \"Have you been to the ER, urgent care clinic since your last visit?  Hospitalized since your last visit?\"    NO    “Have you seen or consulted any other health care providers outside of [facility] since your last visit?”    NO       Have you had a mammogram?”   YES - Where: 2024 with Dr. Hull Nurse/COLETTE to request most recent records if not in the chart    Date of last Mammogram: 11/22/2021

## 2024-04-18 NOTE — PROGRESS NOTES
Ms. Vanessa Martinez is presenting to follow up     CC:  Hypertension       HPI:              Ms. Vanessa Martinez   is a 68 y.o. female with a hx of morbid obesity, resistant HTN presenting to discuss BP        HTN: blood pressure is excellent on chlorthalidone 25mg, avapro 300 and metoprolol 50mg XL  Besr BP in an long time   Dr Kidd and me have titrated medication and finally found a good combination. She is tolerating it currently    She has mild cardiomyopathy likely due to long standing HTN  She saw cards Dr Kidd on 10/07/2022       Cardiomyopathy: She does seem to have mild cardiomyopathy with ejection fraction of 45 to 50%.  Etiology of this is unclear.  Seemingly relatively asymptomatic at this time.      Negative nuclear test for ischemia       In the interval Dr Mason started metformin but did not tolerate due to GI and did not loose weight.      She is struggling with obesity  Reports she does not eat much carbs \" very little sugar\"  Eats between 12 noon and 6: 30 PM   Reports struggle with hunger   Morning - bowl of oatmeal with blueberries/turkey solitario  Sometimes 2 eggs with spinach  Middle of the day a salad , may add tuna  Snack fruits, oranges       [facility]   Colonoscopy 2018 normal  Declines flu shot  Review of systems:  Constitutional: negative for fever, chills, weight loss, night sweats     10 systems reviewed and negative other then HPI     Past Medical History:   Diagnosis Date    HTN (hypertension) 1/15/2010    Obesity     Osteoarthritis     Sleep apnea 1/20/2015    Sleep apnea, obstructive 1/5/2016        Past Surgical History:   Procedure Laterality Date    APPENDECTOMY      HERNIA REPAIR      HYSTERECTOMY (CERVIX STATUS UNKNOWN)  approx 1987    2/2 uterine fibroids    HYSTERECTOMY, TOTAL ABDOMINAL (CERVIX REMOVED)      OTHER SURGICAL HISTORY  2002    hiatal hernia repair        Allergies   Allergen Reactions    Latex Itching       Current Outpatient

## 2024-04-23 ENCOUNTER — OFFICE VISIT (OUTPATIENT)
Age: 69
End: 2024-04-23
Payer: MEDICARE

## 2024-04-23 DIAGNOSIS — R73.03 PREDIABETES: Primary | ICD-10-CM

## 2024-04-23 DIAGNOSIS — E66.01 MORBID OBESITY (HCC): ICD-10-CM

## 2024-04-23 DIAGNOSIS — E16.1 REACTIVE HYPOGLYCEMIA: ICD-10-CM

## 2024-04-23 DIAGNOSIS — I10 ESSENTIAL HYPERTENSION: ICD-10-CM

## 2024-04-23 PROCEDURE — 1123F ACP DISCUSS/DSCN MKR DOCD: CPT | Performed by: GENERAL ACUTE CARE HOSPITAL

## 2024-04-23 PROCEDURE — 3078F DIAST BP <80 MM HG: CPT | Performed by: GENERAL ACUTE CARE HOSPITAL

## 2024-04-23 PROCEDURE — 1036F TOBACCO NON-USER: CPT | Performed by: GENERAL ACUTE CARE HOSPITAL

## 2024-04-23 PROCEDURE — G8427 DOCREV CUR MEDS BY ELIG CLIN: HCPCS | Performed by: GENERAL ACUTE CARE HOSPITAL

## 2024-04-23 PROCEDURE — 3017F COLORECTAL CA SCREEN DOC REV: CPT | Performed by: GENERAL ACUTE CARE HOSPITAL

## 2024-04-23 PROCEDURE — 3077F SYST BP >= 140 MM HG: CPT | Performed by: GENERAL ACUTE CARE HOSPITAL

## 2024-04-23 PROCEDURE — G8399 PT W/DXA RESULTS DOCUMENT: HCPCS | Performed by: GENERAL ACUTE CARE HOSPITAL

## 2024-04-23 PROCEDURE — 1090F PRES/ABSN URINE INCON ASSESS: CPT | Performed by: GENERAL ACUTE CARE HOSPITAL

## 2024-04-23 PROCEDURE — G8417 CALC BMI ABV UP PARAM F/U: HCPCS | Performed by: GENERAL ACUTE CARE HOSPITAL

## 2024-04-23 PROCEDURE — 99213 OFFICE O/P EST LOW 20 MIN: CPT | Performed by: GENERAL ACUTE CARE HOSPITAL

## 2024-04-23 NOTE — PATIENT INSTRUCTIONS
Plan to have you see with the insurance regarding GLP-1 agonist medications (Wegovy, Zepbound, Saxenda)  Please see   1- The insurance Criteria for approval of the medication  2-Which ones are covered  3-Your pocket cost    Please if possible follow up with ms Carito Dyer

## 2024-04-23 NOTE — PROGRESS NOTES
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     Presents for : Hypoglycemia, Prediabetes      ASSESSMENT AND PLAN:     Prediabetes, improved to 5.2% 4/17/2024  Reactive Hypoglycemia    Today we discussed the difference between true symptomatic hypoglycemia vs asymptomatic borderline blood sugars.   She does not have underlying autoimmunity, and renal/hepatic dysfunction, or glycogen storage diseases.    She is having underlying reactive hypoglycemia associated with carbohydrates consumed. She is consuming mainly low carb intake and following intermittent fasting and feels she has done well with that. However due to lack of weight loss she is frustrated and due to OA cannot increase physical activity level.     Patient has documented hypoglycemic episodes of hypoglycemia in lab draws repeatedly previously. She also had previous syncopal episodes which she says were attributed to hypoglycemia per her evaluation in the ER. For this reason, we evaluated for the cause of hypoglycemia by obtaining relevant labs during a hypoglycemic episode in order to determine if it is an insulin-mediated process. Her blood sugar tested in the clinic when patient was complaining of hunger and feeling more tired is 57 on fingerstick but blood sugar in lab draw was 74. It is not insulin mediated and no fasting hypoglycemia observed, it is underlying impaired blood sugar. Discussed ways to minimize that including eating low carb portions, cutting out processed carbs, and not drinking fluids with meals, avoiding alcohol and excess caffeine and staying active.   Another option for managing is metformin and we discussed benefits and possible side effects and she declines starting it.    Glucometer sent to pharmacy in last visit. She does not want to use CGM as she

## 2024-04-29 VITALS
DIASTOLIC BLOOD PRESSURE: 78 MMHG | BODY MASS INDEX: 48.1 KG/M2 | HEIGHT: 60 IN | HEART RATE: 74 BPM | SYSTOLIC BLOOD PRESSURE: 158 MMHG | WEIGHT: 245 LBS

## 2024-04-29 PROBLEM — E16.1 REACTIVE HYPOGLYCEMIA: Status: ACTIVE | Noted: 2024-04-29

## 2024-04-29 PROBLEM — R73.03 PREDIABETES: Status: ACTIVE | Noted: 2024-04-29

## 2024-05-03 RX ORDER — METFORMIN HYDROCHLORIDE 500 MG/1
500 TABLET, EXTENDED RELEASE ORAL
Qty: 90 TABLET | Refills: 1 | Status: SHIPPED | OUTPATIENT
Start: 2024-05-03 | End: 2024-05-03

## 2024-05-03 RX ORDER — SEMAGLUTIDE 1 MG/.5ML
INJECTION, SOLUTION SUBCUTANEOUS
Qty: 6 ML | Refills: 3 | Status: SHIPPED | OUTPATIENT
Start: 2024-05-03

## 2024-05-03 RX ORDER — SEMAGLUTIDE 0.5 MG/.5ML
INJECTION, SOLUTION SUBCUTANEOUS
Qty: 2 ML | Refills: 0 | Status: SHIPPED | OUTPATIENT
Start: 2024-05-03

## 2024-05-03 RX ORDER — SEMAGLUTIDE 0.25 MG/.5ML
INJECTION, SOLUTION SUBCUTANEOUS
Qty: 2 ML | Refills: 0 | Status: SHIPPED | OUTPATIENT
Start: 2024-05-03

## 2024-05-03 NOTE — TELEPHONE ENCOUNTER
Spoke with ms Vanessa and she states she did not request metformin dose to be sent, and she contacted her insruance and that all weight loss medications are non-preferred and will need a Prior Authorization for approval, she wants to start on Wegovy and given the doses of Wegovy and she will contact [facility] to let us know how to send the script to them regarding escalating the dose, patient indicates understanding and will contact us back regarding that  Insurance info for Prior Authorization contact number: [phone]

## 2024-05-09 ENCOUNTER — TELEPHONE (OUTPATIENT)
Age: 69
End: 2024-05-09

## 2024-06-01 ENCOUNTER — OFFICE VISIT (OUTPATIENT)
Age: 69
End: 2024-06-01

## 2024-06-01 VITALS
OXYGEN SATURATION: 99 % | SYSTOLIC BLOOD PRESSURE: 164 MMHG | DIASTOLIC BLOOD PRESSURE: 91 MMHG | BODY MASS INDEX: 47.26 KG/M2 | WEIGHT: 242 LBS | HEART RATE: 76 BPM | RESPIRATION RATE: 18 BRPM | TEMPERATURE: 98 F

## 2024-06-01 DIAGNOSIS — M79.676 PAIN OF TOE, UNSPECIFIED LATERALITY: Primary | ICD-10-CM

## 2024-06-06 ASSESSMENT — ENCOUNTER SYMPTOMS: COLOR CHANGE: 0

## 2024-06-06 NOTE — PROGRESS NOTES
Subjective     Chief Complaint   Patient presents with    Toe Pain         Toe Pain   The incident occurred 3 to 5 days ago. There was no injury mechanism. The pain is present in the left toes. The quality of the pain is described as stabbing. The pain is at a severity of 3/10. The pain is mild. The pain has been Intermittent since onset. Pertinent negatives include no inability to bear weight, loss of motion, loss of sensation, muscle weakness, numbness or tingling. She reports no foreign bodies present. The symptoms are aggravated by movement and weight bearing. She has tried acetaminophen, non-weight bearing and rest for the symptoms. The treatment provided mild relief.       Past Medical History:   Diagnosis Date    HTN (hypertension) 1/15/2010    Obesity     Osteoarthritis     Sleep apnea 1/20/2015    Sleep apnea, obstructive 1/5/2016       Past Surgical History:   Procedure Laterality Date    APPENDECTOMY      HERNIA REPAIR      HYSTERECTOMY (CERVIX STATUS UNKNOWN)  approx 1987    2/2 uterine fibroids    HYSTERECTOMY, TOTAL ABDOMINAL (CERVIX REMOVED)      OTHER SURGICAL HISTORY  2002    hiatal hernia repair        Family History   Problem Relation Age of Onset    Cancer Mother         oral    Arthritis Mother     Hypertension Father     Prostate Cancer Father     Kidney Disease Father     Hypertension Sister     Hypertension Brother        Allergies   Allergen Reactions    Latex Itching       Social History     Tobacco Use    Smoking status: Never     Passive exposure: Never    Smokeless tobacco: Never   Vaping Use    Vaping Use: Never used   Substance Use Topics    Alcohol use: Not Currently    Drug use: No       Vitals:    06/01/24 1626   BP: (!) 164/91   Pulse: 76   Resp: 18   Temp: 98 °F (36.7 °C)   SpO2: 99%       Review of Systems   Musculoskeletal:  Positive for arthralgias. Negative for joint swelling.   Skin:  Negative for color change.   Neurological:  Negative for tingling and numbness.

## 2024-06-09 DIAGNOSIS — I10 ESSENTIAL (PRIMARY) HYPERTENSION: ICD-10-CM

## 2024-06-10 RX ORDER — METOPROLOL SUCCINATE 50 MG/1
50 TABLET, EXTENDED RELEASE ORAL
Qty: 90 TABLET | Refills: 1 | Status: SHIPPED | OUTPATIENT
Start: 2024-06-10

## 2024-06-10 NOTE — TELEPHONE ENCOUNTER
Cardiologist: Dr. Kidd    Future Appointments   Date Time Provider Department Center   9/24/2024  9:50 AM Rosas Mason MD RDE [facility] BS AMB   10/28/2024  9:20 AM Shilo Kidd MD [facility]   10/31/2024  9:15 AM Deloris Claire MD [facility] BS AMB       Requested Prescriptions     Signed Prescriptions Disp Refills    metoprolol succinate (TOPROL XL) 50 MG extended release tablet 90 tablet 1     Sig: Take 1 tablet by mouth nightly     Authorizing Provider: SHILO KIDD     Ordering User: GEREMIAS BOSS         Refills VO per Dr. Kidd.

## 2024-07-06 DIAGNOSIS — M17.10 UNILATERAL PRIMARY OSTEOARTHRITIS, UNSPECIFIED KNEE: ICD-10-CM

## 2024-07-09 RX ORDER — MELOXICAM 15 MG/1
TABLET ORAL
Qty: 30 TABLET | Refills: 2 | Status: SHIPPED | OUTPATIENT
Start: 2024-07-09

## 2024-08-27 DIAGNOSIS — I10 ESSENTIAL (PRIMARY) HYPERTENSION: ICD-10-CM

## 2024-08-27 RX ORDER — IRBESARTAN 300 MG/1
TABLET ORAL
Qty: 90 TABLET | Refills: 1 | Status: SHIPPED | OUTPATIENT
Start: 2024-08-27

## 2024-09-24 ENCOUNTER — OFFICE VISIT (OUTPATIENT)
Age: 69
End: 2024-09-24

## 2024-09-24 VITALS
WEIGHT: 244.8 LBS | SYSTOLIC BLOOD PRESSURE: 152 MMHG | HEIGHT: 60 IN | BODY MASS INDEX: 48.06 KG/M2 | HEART RATE: 62 BPM | DIASTOLIC BLOOD PRESSURE: 90 MMHG

## 2024-09-24 DIAGNOSIS — E66.01 MORBID OBESITY: ICD-10-CM

## 2024-09-24 DIAGNOSIS — R73.03 PREDIABETES: Primary | ICD-10-CM

## 2024-10-13 DIAGNOSIS — M17.10 UNILATERAL PRIMARY OSTEOARTHRITIS, UNSPECIFIED KNEE: ICD-10-CM

## 2024-10-14 RX ORDER — MELOXICAM 15 MG/1
TABLET ORAL
Qty: 30 TABLET | Refills: 2 | Status: SHIPPED | OUTPATIENT
Start: 2024-10-14

## 2024-10-28 ENCOUNTER — OFFICE VISIT (OUTPATIENT)
Age: 69
End: 2024-10-28
Payer: MEDICARE

## 2024-10-28 VITALS
HEIGHT: 60 IN | OXYGEN SATURATION: 99 % | BODY MASS INDEX: 47.51 KG/M2 | SYSTOLIC BLOOD PRESSURE: 160 MMHG | WEIGHT: 242 LBS | DIASTOLIC BLOOD PRESSURE: 80 MMHG | RESPIRATION RATE: 18 BRPM | HEART RATE: 68 BPM

## 2024-10-28 DIAGNOSIS — E66.01 MORBID (SEVERE) OBESITY DUE TO EXCESS CALORIES: ICD-10-CM

## 2024-10-28 DIAGNOSIS — I10 ESSENTIAL (PRIMARY) HYPERTENSION: Primary | ICD-10-CM

## 2024-10-28 DIAGNOSIS — I49.9 IRREGULAR HEART BEAT: ICD-10-CM

## 2024-10-28 DIAGNOSIS — I42.8 OTHER CARDIOMYOPATHIES (HCC): ICD-10-CM

## 2024-10-28 DIAGNOSIS — R55 SYNCOPE AND COLLAPSE: ICD-10-CM

## 2024-10-28 PROCEDURE — 99214 OFFICE O/P EST MOD 30 MIN: CPT | Performed by: SPECIALIST

## 2024-10-28 PROCEDURE — 93005 ELECTROCARDIOGRAM TRACING: CPT | Performed by: SPECIALIST

## 2024-10-28 RX ORDER — CHLORTHALIDONE 25 MG/1
25 TABLET ORAL DAILY
Qty: 90 TABLET | Refills: 1 | Status: SHIPPED | OUTPATIENT
Start: 2024-10-28

## 2024-10-28 ASSESSMENT — PATIENT HEALTH QUESTIONNAIRE - PHQ9
SUM OF ALL RESPONSES TO PHQ QUESTIONS 1-9: 0
2. FEELING DOWN, DEPRESSED OR HOPELESS: NOT AT ALL
SUM OF ALL RESPONSES TO PHQ9 QUESTIONS 1 & 2: 0
1. LITTLE INTEREST OR PLEASURE IN DOING THINGS: NOT AT ALL

## 2024-10-28 NOTE — PROGRESS NOTES
[facility]                                     DIVISION OF CARDIOLOGY                                                                             OFFICE NOTE                  KITTY VIRAMONTES M.D. , BERHANE            FADY PITTS   1955  [id number]    Date/Time:  10/28/437857:02 AM      BP (!) 160/98 (Site: Right Upper Arm, Position: Sitting, Cuff Size: Large Adult)   Pulse 68   Resp 18   Ht 1.524 m (5')   Wt 109.8 kg (242 lb)   SpO2 99%   BMI 47.26 kg/m²        Wt Readings from Last 3 Encounters:   10/28/24 109.8 kg (242 lb)   09/24/24 111 kg (244 lb 12.8 oz)   06/01/24 109.8 kg (242 lb)          Lab Results   Component Value Date    CHOL 221 (H) 05/11/2022    TRIG 93 05/11/2022    HDL 75 05/11/2022    VLDL 16 05/11/2022    CHOLHDLRATIO 2.4 09/03/2021              SUBJECTIVE:  She seems to be doing well with no chest pain shortness of palpitations.  She is attempting to undergo weight loss program about having some issues with insurance to approve her medications.    No significant shortness of breath reported once again.  She remains somewhat active.       Assessment/Plan  1.  Cardiomyopathy: We have discussed the results of echocardiogram January thousand 23 with normal ejection fraction of 55 to 60%.      Clinically she seems compensated at this time.    In December 2022 nuclear stress test with no clear evidence for ischemia and an estimated normal ejection fraction at 71%.    For now continue Toprol and Avapro.    Faizan echo at the next office visit     No recurrent chest pain the chest discomfort upon palpation was likely costochondritis.     Repeat stress test on the back burner for now.  She will let me know if any recurrent symptoms.     Her EKG today reveals normal sinus rhythm without significant ST-T changes    2.  Hypertension: Blood pressure still little bit elevated discussed chlorthalidone increase.  Will increase it to 25 mg daily she did have some itching

## 2024-10-28 NOTE — PATIENT INSTRUCTIONS
Increase chlorthalidone to 25 mg daily.    Record blood pressure/heart rate daily.  Remember to sit for at least 3 minutes.  Have both feet flat on floor and arm at heart level.  Call with readings in 2 weeks or send via [employer].    Schedule follow up with Dr. Kidd in 4 months.      Have echocardiogram done, 1 week prior to follow up.

## 2024-10-31 ENCOUNTER — OFFICE VISIT (OUTPATIENT)
Age: 69
End: 2024-10-31

## 2024-10-31 VITALS
OXYGEN SATURATION: 94 % | TEMPERATURE: 98.1 F | BODY MASS INDEX: 48.02 KG/M2 | SYSTOLIC BLOOD PRESSURE: 139 MMHG | HEIGHT: 60 IN | DIASTOLIC BLOOD PRESSURE: 74 MMHG | HEART RATE: 64 BPM | RESPIRATION RATE: 18 BRPM | WEIGHT: 244.6 LBS

## 2024-10-31 DIAGNOSIS — E66.01 MORBID OBESITY WITH BODY MASS INDEX OF 45.0-49.9 IN ADULT: ICD-10-CM

## 2024-10-31 DIAGNOSIS — I10 ESSENTIAL (PRIMARY) HYPERTENSION: ICD-10-CM

## 2024-10-31 DIAGNOSIS — Z00.00 MEDICARE ANNUAL WELLNESS VISIT, SUBSEQUENT: Primary | ICD-10-CM

## 2024-10-31 DIAGNOSIS — E88.819 INSULIN RESISTANCE: ICD-10-CM

## 2024-10-31 DIAGNOSIS — E78.00 HIGH CHOLESTEROL: ICD-10-CM

## 2024-10-31 LAB
ALBUMIN SERPL-MCNC: 4 G/DL (ref 3.5–5)
ALBUMIN/GLOB SERPL: 1.4 (ref 1.1–2.2)
ALP SERPL-CCNC: 101 U/L (ref 45–117)
ALT SERPL-CCNC: 18 U/L (ref 12–78)
ANION GAP SERPL CALC-SCNC: 4 MMOL/L (ref 2–12)
AST SERPL-CCNC: 16 U/L (ref 15–37)
BASOPHILS # BLD: 0 K/UL (ref 0–0.1)
BASOPHILS NFR BLD: 0 % (ref 0–1)
BILIRUB SERPL-MCNC: 0.4 MG/DL (ref 0.2–1)
BUN SERPL-MCNC: 25 MG/DL (ref 6–20)
BUN/CREAT SERPL: 25 (ref 12–20)
CALCIUM SERPL-MCNC: 9.5 MG/DL (ref 8.5–10.1)
CHLORIDE SERPL-SCNC: 105 MMOL/L (ref 97–108)
CHOLEST SERPL-MCNC: 213 MG/DL
CO2 SERPL-SCNC: 32 MMOL/L (ref 21–32)
CREAT SERPL-MCNC: 1.02 MG/DL (ref 0.55–1.02)
DIFFERENTIAL METHOD BLD: NORMAL
EOSINOPHIL # BLD: 0.1 K/UL (ref 0–0.4)
EOSINOPHIL NFR BLD: 2 % (ref 0–7)
ERYTHROCYTE [DISTWIDTH] IN BLOOD BY AUTOMATED COUNT: 12.8 % (ref 11.5–14.5)
GLOBULIN SER CALC-MCNC: 2.8 G/DL (ref 2–4)
GLUCOSE SERPL-MCNC: 100 MG/DL (ref 65–100)
HCT VFR BLD AUTO: 39.5 % (ref 35–47)
HDLC SERPL-MCNC: 62 MG/DL
HDLC SERPL: 3.4 (ref 0–5)
HGB BLD-MCNC: 12.4 G/DL (ref 11.5–16)
IMM GRANULOCYTES # BLD AUTO: 0 K/UL (ref 0–0.04)
IMM GRANULOCYTES NFR BLD AUTO: 0 % (ref 0–0.5)
LDLC SERPL CALC-MCNC: 123.4 MG/DL (ref 0–100)
LYMPHOCYTES # BLD: 1.9 K/UL (ref 0.8–3.5)
LYMPHOCYTES NFR BLD: 35 % (ref 12–49)
MCH RBC QN AUTO: 28.4 PG (ref 26–34)
MCHC RBC AUTO-ENTMCNC: 31.4 G/DL (ref 30–36.5)
MCV RBC AUTO: 90.6 FL (ref 80–99)
MONOCYTES # BLD: 0.4 K/UL (ref 0–1)
MONOCYTES NFR BLD: 7 % (ref 5–13)
NEUTS SEG # BLD: 2.9 K/UL (ref 1.8–8)
NEUTS SEG NFR BLD: 56 % (ref 32–75)
NRBC # BLD: 0 K/UL (ref 0–0.01)
NRBC BLD-RTO: 0 PER 100 WBC
PLATELET # BLD AUTO: 346 K/UL (ref 150–400)
PMV BLD AUTO: 10 FL (ref 8.9–12.9)
POTASSIUM SERPL-SCNC: 4.3 MMOL/L (ref 3.5–5.1)
PROT SERPL-MCNC: 6.8 G/DL (ref 6.4–8.2)
RBC # BLD AUTO: 4.36 M/UL (ref 3.8–5.2)
SODIUM SERPL-SCNC: 141 MMOL/L (ref 136–145)
TRIGL SERPL-MCNC: 138 MG/DL
VLDLC SERPL CALC-MCNC: 27.6 MG/DL
WBC # BLD AUTO: 5.3 K/UL (ref 3.6–11)

## 2024-10-31 SDOH — ECONOMIC STABILITY: FOOD INSECURITY: WITHIN THE PAST 12 MONTHS, YOU WORRIED THAT YOUR FOOD WOULD RUN OUT BEFORE YOU GOT MONEY TO BUY MORE.: SOMETIMES TRUE

## 2024-10-31 SDOH — ECONOMIC STABILITY: INCOME INSECURITY: HOW HARD IS IT FOR YOU TO PAY FOR THE VERY BASICS LIKE FOOD, HOUSING, MEDICAL CARE, AND HEATING?: SOMEWHAT HARD

## 2024-10-31 SDOH — ECONOMIC STABILITY: FOOD INSECURITY: WITHIN THE PAST 12 MONTHS, THE FOOD YOU BOUGHT JUST DIDN'T LAST AND YOU DIDN'T HAVE MONEY TO GET MORE.: NEVER TRUE

## 2024-10-31 ASSESSMENT — PATIENT HEALTH QUESTIONNAIRE - PHQ9
SUM OF ALL RESPONSES TO PHQ9 QUESTIONS 1 & 2: 0
SUM OF ALL RESPONSES TO PHQ9 QUESTIONS 1 & 2: 0
SUM OF ALL RESPONSES TO PHQ QUESTIONS 1-9: 0
SUM OF ALL RESPONSES TO PHQ QUESTIONS 1-9: 0
2. FEELING DOWN, DEPRESSED OR HOPELESS: NOT AT ALL
1. LITTLE INTEREST OR PLEASURE IN DOING THINGS: NOT AT ALL
SUM OF ALL RESPONSES TO PHQ QUESTIONS 1-9: 0
1. LITTLE INTEREST OR PLEASURE IN DOING THINGS: NOT AT ALL
SUM OF ALL RESPONSES TO PHQ QUESTIONS 1-9: 0
2. FEELING DOWN, DEPRESSED OR HOPELESS: NOT AT ALL

## 2024-10-31 ASSESSMENT — LIFESTYLE VARIABLES
HOW MANY STANDARD DRINKS CONTAINING ALCOHOL DO YOU HAVE ON A TYPICAL DAY: PATIENT DOES NOT DRINK
HOW OFTEN DO YOU HAVE A DRINK CONTAINING ALCOHOL: NEVER

## 2024-10-31 NOTE — PROGRESS NOTES
\"Have you been to the ER, urgent care clinic since your last visit?  Hospitalized since your last visit?\"    NO    “Have you seen or consulted any other health care providers outside our system since your last visit?”    NO    Have you had a mammogram?”   NO    Date of last Mammogram: 11/22/2021

## 2024-10-31 NOTE — PROGRESS NOTES
Medicare Annual Wellness Visit    Vanessa Martinez is here for Hypertension, Weight Management, and Medicare AWV    Assessment & Plan      Medicare annual wellness visit, subsequent  Morbid obesity with body mass index of 45.0-49.9 in adult  -     Comprehensive Metabolic Panel; Future  -     CBC with Auto Differential; Future  Essential (primary) hypertension  -     Comprehensive Metabolic Panel; Future  -     CBC with Auto Differential; Future  Insulin resistance  High cholesterol  -     Lipid Panel; Future    Assessment & Plan    Her blood pressure readings are within the normal range. She is currently taking chlorthalidone, Avapro, and metoprolol. She has stopped taking metformin. She exercises 2-3 days a week for 30 minutes each session. She has seen a dentist and had an eye exam. A living will form was provided for her to complete.   Declines vaccines   Records of mammogram requested    The patient (or guardian, if applicable) and other individuals in attendance with the patient were advised that Artificial Intelligence will be utilized during this visit to record, process the conversation to generate a clinical note, and support improvement of the AI technology. The patient (or guardian, if applicable) and other individuals in attendance at the appointment consented to the use of AI, including the recording.                   Results      Recommendations for Preventive Services Due: see orders and patient instructions/AVS.  Recommended screening schedule for the next 5-10 years is provided to the patient in written form: see Patient Instructions/AVS.     Return in 6 months (on 4/30/2025).     Subjective   History of Present Illness    Pre diabetes improved  HA1C 5.2 and reactive hypoglycemia resolved, she is following with Dr Mason last seen one month ago.   She is discussing with Dr Mason the possibility of GLP 1 medications for weight loss      HTN: blood pressure is excellent on chlorthalidone 25mg, avapro 300

## 2024-10-31 NOTE — PROGRESS NOTES
Ms. Vanessa Martinez is presenting to follow up     CC:  Hypertension and Weight Management       HPI:              Ms. Vanessa Martinez   is a 68 y.o. female with a hx of morbid obesity, resistant HTN presenting to discuss BP        HTN: blood pressure is excellent on chlorthalidone 25mg, avapro 300 and metoprolol 50mg XL  Besr BP in an long time   Dr Kidd and me have titrated medication and finally found a good combination. She is tolerating it currently    She has mild cardiomyopathy likely due to long standing HTN  She saw cards Dr Kidd on 10/07/2022       Cardiomyopathy: She does seem to have mild cardiomyopathy with ejection fraction of 45 to 50%.  Etiology of this is unclear.  Seemingly relatively asymptomatic at this time.      Negative nuclear test for ischemia       In the interval Dr Mason started metformin but did not tolerate due to GI and did not loose weight.      She is struggling with obesity  Reports she does not eat much carbs \" very little sugar\"  Eats between 12 noon and 6: 30 PM   Reports struggle with hunger   Morning - bowl of oatmeal with blueberries/turkey solitario  Sometimes 2 eggs with spinach  Middle of the day a salad , may add tuna  Snack fruits, oranges       [facility]   Colonoscopy 2018 normal  Declines flu shot  Review of systems:  Constitutional: negative for fever, chills, weight loss, night sweats     10 systems reviewed and negative other then HPI     Past Medical History:   Diagnosis Date    HTN (hypertension) 1/15/2010    Obesity     Osteoarthritis     Sleep apnea 1/20/2015    Sleep apnea, obstructive 1/5/2016        Past Surgical History:   Procedure Laterality Date    APPENDECTOMY      HERNIA REPAIR      HYSTERECTOMY (CERVIX STATUS UNKNOWN)  approx 1987    2/2 uterine fibroids    HYSTERECTOMY, TOTAL ABDOMINAL (CERVIX REMOVED)      OTHER SURGICAL HISTORY  2002    hiatal hernia repair        Allergies   Allergen Reactions    Latex Itching

## 2024-12-02 DIAGNOSIS — I10 ESSENTIAL (PRIMARY) HYPERTENSION: ICD-10-CM

## 2024-12-02 RX ORDER — METOPROLOL SUCCINATE 50 MG/1
50 TABLET, EXTENDED RELEASE ORAL
Qty: 90 TABLET | Refills: 1 | Status: CANCELLED | OUTPATIENT
Start: 2024-12-02

## 2024-12-04 DIAGNOSIS — I10 ESSENTIAL (PRIMARY) HYPERTENSION: ICD-10-CM

## 2024-12-04 RX ORDER — METOPROLOL SUCCINATE 50 MG/1
50 TABLET, EXTENDED RELEASE ORAL NIGHTLY
Qty: 90 TABLET | Refills: 1 | Status: SHIPPED | OUTPATIENT
Start: 2024-12-04

## 2024-12-04 RX ORDER — CHLORTHALIDONE 25 MG/1
12.5 TABLET ORAL DAILY
Qty: 45 TABLET | Refills: 5 | Status: SHIPPED | OUTPATIENT
Start: 2024-12-04

## 2024-12-04 NOTE — TELEPHONE ENCOUNTER
Requested Prescriptions     Signed Prescriptions Disp Refills    metoprolol succinate (TOPROL XL) 50 MG extended release tablet 90 tablet 1     Sig: TAKE 1 TABLET BY MOUTH EVERY DAY AT NIGHT     Authorizing Provider: SHILO KIDD     Ordering User: MARIO MARQUIS     Verbal order per Dr. Kidd.    Future Appointments   Date Time Provider Department Center   3/17/2025 11:00 AM BSC DENT ECHO 2 [facility] BS [facility]   3/31/2025 10:20 AM Shilo Kidd MD [facility]   4/4/2025  9:10 AM Rosas Mason MD [facility] BS [facility]   5/1/2025  9:45 AM Deloris Claire MD [facility] DEP

## 2025-01-17 DIAGNOSIS — M17.10 UNILATERAL PRIMARY OSTEOARTHRITIS, UNSPECIFIED KNEE: ICD-10-CM

## 2025-01-17 RX ORDER — MELOXICAM 15 MG/1
TABLET ORAL
Qty: 30 TABLET | Refills: 2 | Status: SHIPPED | OUTPATIENT
Start: 2025-01-17

## 2025-03-03 DIAGNOSIS — I10 ESSENTIAL (PRIMARY) HYPERTENSION: ICD-10-CM

## 2025-03-03 RX ORDER — IRBESARTAN 300 MG/1
TABLET ORAL
Qty: 90 TABLET | Refills: 1 | Status: SHIPPED | OUTPATIENT
Start: 2025-03-03

## 2025-03-17 ENCOUNTER — ANCILLARY PROCEDURE (OUTPATIENT)
Age: 70
End: 2025-03-17
Payer: MEDICARE

## 2025-03-17 VITALS — WEIGHT: 244 LBS | BODY MASS INDEX: 47.91 KG/M2 | HEIGHT: 60 IN

## 2025-03-17 DIAGNOSIS — I42.8 OTHER CARDIOMYOPATHIES: ICD-10-CM

## 2025-03-17 PROCEDURE — 93306 TTE W/DOPPLER COMPLETE: CPT | Performed by: SPECIALIST

## 2025-03-25 LAB
ECHO AO ROOT DIAM: 2.9 CM
ECHO AO ROOT INDEX: 1.43 CM/M2
ECHO AV AREA PEAK VELOCITY: 2.1 CM2
ECHO AV AREA VTI: 2.1 CM2
ECHO AV AREA/BSA PEAK VELOCITY: 1 CM2/M2
ECHO AV AREA/BSA VTI: 1 CM2/M2
ECHO AV MEAN GRADIENT: 5 MMHG
ECHO AV MEAN VELOCITY: 1 M/S
ECHO AV PEAK GRADIENT: 8 MMHG
ECHO AV PEAK VELOCITY: 1.4 M/S
ECHO AV VELOCITY RATIO: 0.79
ECHO AV VTI: 30.5 CM
ECHO BSA: 2.16 M2
ECHO LA DIAMETER INDEX: 2.02 CM/M2
ECHO LA DIAMETER: 4.1 CM
ECHO LA TO AORTIC ROOT RATIO: 1.41
ECHO LA VOL A-L A2C: 50 ML (ref 22–52)
ECHO LA VOL A-L A4C: 62 ML (ref 22–52)
ECHO LA VOL BP: 53 ML (ref 22–52)
ECHO LA VOL MOD A2C: 48 ML (ref 22–52)
ECHO LA VOL MOD A4C: 56 ML (ref 22–52)
ECHO LA VOL/BSA BIPLANE: 26 ML/M2 (ref 16–34)
ECHO LA VOLUME AREA LENGTH: 57 ML
ECHO LA VOLUME INDEX A-L A2C: 25 ML/M2 (ref 16–34)
ECHO LA VOLUME INDEX A-L A4C: 31 ML/M2 (ref 16–34)
ECHO LA VOLUME INDEX AREA LENGTH: 28 ML/M2 (ref 16–34)
ECHO LA VOLUME INDEX MOD A2C: 24 ML/M2 (ref 16–34)
ECHO LA VOLUME INDEX MOD A4C: 28 ML/M2 (ref 16–34)
ECHO LV E' LATERAL VELOCITY: 4.63 CM/S
ECHO LV E' SEPTAL VELOCITY: 4.83 CM/S
ECHO LV EDV A2C: 28 ML
ECHO LV EDV A4C: 37 ML
ECHO LV EDV BP: 32 ML (ref 56–104)
ECHO LV EDV INDEX A4C: 18 ML/M2
ECHO LV EDV INDEX BP: 16 ML/M2
ECHO LV EDV NDEX A2C: 14 ML/M2
ECHO LV EF PHYSICIAN: 60 %
ECHO LV EJECTION FRACTION A2C: 71 %
ECHO LV EJECTION FRACTION A4C: 67 %
ECHO LV EJECTION FRACTION BIPLANE: 65 % (ref 55–100)
ECHO LV ESV A2C: 8 ML
ECHO LV ESV A4C: 12 ML
ECHO LV ESV BP: 11 ML (ref 19–49)
ECHO LV ESV INDEX A2C: 4 ML/M2
ECHO LV ESV INDEX A4C: 6 ML/M2
ECHO LV ESV INDEX BP: 5 ML/M2
ECHO LV FRACTIONAL SHORTENING: 36 % (ref 28–44)
ECHO LV INTERNAL DIMENSION DIASTOLE INDEX: 1.92 CM/M2
ECHO LV INTERNAL DIMENSION DIASTOLIC: 3.9 CM (ref 3.9–5.3)
ECHO LV INTERNAL DIMENSION SYSTOLIC INDEX: 1.23 CM/M2
ECHO LV INTERNAL DIMENSION SYSTOLIC: 2.5 CM
ECHO LV IVSD: 1.2 CM (ref 0.6–0.9)
ECHO LV MASS 2D: 179.7 G (ref 67–162)
ECHO LV MASS INDEX 2D: 88.5 G/M2 (ref 43–95)
ECHO LV POSTERIOR WALL DIASTOLIC: 1.4 CM (ref 0.6–0.9)
ECHO LV RELATIVE WALL THICKNESS RATIO: 0.72
ECHO LVOT AREA: 2.5 CM2
ECHO LVOT AV VTI INDEX: 0.81
ECHO LVOT DIAM: 1.8 CM
ECHO LVOT MEAN GRADIENT: 3 MMHG
ECHO LVOT PEAK GRADIENT: 5 MMHG
ECHO LVOT PEAK VELOCITY: 1.1 M/S
ECHO LVOT STROKE VOLUME INDEX: 30.8 ML/M2
ECHO LVOT SV: 62.6 ML
ECHO LVOT VTI: 24.6 CM
ECHO MV A VELOCITY: 0.78 M/S
ECHO MV E DECELERATION TIME (DT): 202.9 MS
ECHO MV E VELOCITY: 0.52 M/S
ECHO MV E/A RATIO: 0.67
ECHO MV E/E' LATERAL: 11.23
ECHO MV E/E' RATIO (AVERAGED): 11
ECHO MV E/E' SEPTAL: 10.77
ECHO PV MAX VELOCITY: 1 M/S
ECHO PV PEAK GRADIENT: 4 MMHG

## 2025-03-25 PROCEDURE — 93306 TTE W/DOPPLER COMPLETE: CPT | Performed by: SPECIALIST

## 2025-04-04 ENCOUNTER — OFFICE VISIT (OUTPATIENT)
Age: 70
End: 2025-04-04

## 2025-04-04 VITALS
HEIGHT: 60 IN | BODY MASS INDEX: 49.16 KG/M2 | DIASTOLIC BLOOD PRESSURE: 79 MMHG | WEIGHT: 250.4 LBS | HEART RATE: 65 BPM | SYSTOLIC BLOOD PRESSURE: 166 MMHG

## 2025-04-04 DIAGNOSIS — I10 ESSENTIAL HYPERTENSION: ICD-10-CM

## 2025-04-04 DIAGNOSIS — Z87.898 HISTORY OF PREDIABETES: ICD-10-CM

## 2025-04-04 DIAGNOSIS — E66.01 MORBID OBESITY: Primary | ICD-10-CM

## 2025-04-04 LAB — HBA1C MFR BLD: 5.2 %

## 2025-04-04 RX ORDER — SEMAGLUTIDE 0.5 MG/.5ML
0.5 INJECTION, SOLUTION SUBCUTANEOUS
Qty: 2 ML | Refills: 3 | Status: SHIPPED | OUTPATIENT
Start: 2025-04-04

## 2025-04-04 RX ORDER — SEMAGLUTIDE 0.25 MG/.5ML
0.25 INJECTION, SOLUTION SUBCUTANEOUS
Qty: 2 ML | Refills: 0 | Status: SHIPPED | COMMUNITY
Start: 2025-04-04 | End: 2025-04-04 | Stop reason: DRUGHIGH

## 2025-04-04 NOTE — PATIENT INSTRUCTIONS
Start the Wegovy 0.25mg weekly for 4 weeks then increase to 0.5mg weekly     Please establish care with new Endocrinologist     List of Endocrinology practices in the [facility]    We would like to inform you that Dr. Mason will be leaving the practice effective June 1st 2025. To ensure continuity of your endocrinology care, we have compiled a list of available endocrinology practices in the [facility] for your consideration.  [facility]:  [facility]  Address: [address] [place] [address]  Phone: [phone]  [facility]  Address: [address] [place] [address] [place] [address]  Phone: [phone]  Fax: [phone]  Find a Provider with [facility] Hotline: [phone]  Non-[facility] End[facility] Practices:  [facility]  [address] [address]  Phone: [phone]  [facility]  [facility] [place] [address]  [address] [place] [address]  Phone: [phone]  Fax: [phone]  Email: [email or web address]  [facility]  [place] Office: [address] [place] [address]  [place] Office: [address] [address]  Phone: [phone]  Fax: [phone]  [facility]  Address: [address] [place] [address]  Phone: [phone]  [facility]  Address: [address], [place] [address]  Phone: [phone]  Fax: [phone]  Email: [email or web address]  Please contact these practices directly to schedule an appointment or for further inquiries. We appreciate the opportunity to have been part of your care and wish you continued health and well-being.  Sincerely,    Dr Mason

## 2025-04-04 NOTE — PROGRESS NOTES
[facility]  DR ONELIA DUMONT       The patient (or guardian, if applicable) and other individuals in attendance with the patient were advised that Artificial Intelligence will be utilized during this visit to record, process the conversation to generate a clinical note, and support improvement of the AI technology. The patient (or guardian, if applicable) and other individuals in attendance at the appointment consented to the use of AI, including the recording.      Vanessa Martinez is a 69 y.o. female  has a past medical history of HTN (hypertension), Obesity, Osteoarthritis, Sleep apnea, and Sleep apnea, obstructive.       ASSESSMENT AND PLAN:     Prediabetes, resolved to 5.2% 4/4/25   Reactive Hypoglycemia, resolved     Today we discussed the difference between true symptomatic hypoglycemia vs asymptomatic borderline blood sugars.   She does not have underlying autoimmunity, and renal/hepatic dysfunction, or glycogen storage diseases.  She is having underlying reactive hypoglycemia associated with carbohydrates consumed. She is consuming mainly low carb intake and following intermittent fasting and feels she has done well with that. However due to lack of weight loss she is frustrated and due to OA cannot increase physical activity level.   In the last visit we have gotten Wegovy approved but was not able to get it at the pharmacy due to shortages with supplies    Patient has documented hypoglycemic episodes of hypoglycemia in lab draws repeatedly previously. She also had previous syncopal episodes which she says were attributed to hypoglycemia per her evaluation in the ER. For this reason, we evaluated for the cause of hypoglycemia by obtaining relevant labs during a hypoglycemic episode in order to determine if it is an insulin-mediated process. Her blood sugar tested in the clinic when patient was complaining of hunger and feeling more tired is 57 on fingerstick but blood sugar

## 2025-04-07 ENCOUNTER — OFFICE VISIT (OUTPATIENT)
Age: 70
End: 2025-04-07
Payer: MEDICARE

## 2025-04-07 VITALS
DIASTOLIC BLOOD PRESSURE: 84 MMHG | SYSTOLIC BLOOD PRESSURE: 144 MMHG | BODY MASS INDEX: 48.81 KG/M2 | HEART RATE: 75 BPM | OXYGEN SATURATION: 95 % | WEIGHT: 248.6 LBS | HEIGHT: 60 IN

## 2025-04-07 DIAGNOSIS — R55 SYNCOPE AND COLLAPSE: ICD-10-CM

## 2025-04-07 DIAGNOSIS — I49.9 IRREGULAR HEART BEAT: ICD-10-CM

## 2025-04-07 DIAGNOSIS — I10 ESSENTIAL (PRIMARY) HYPERTENSION: ICD-10-CM

## 2025-04-07 DIAGNOSIS — I42.8 OTHER CARDIOMYOPATHIES: Primary | ICD-10-CM

## 2025-04-07 PROCEDURE — 1123F ACP DISCUSS/DSCN MKR DOCD: CPT | Performed by: SPECIALIST

## 2025-04-07 PROCEDURE — 93005 ELECTROCARDIOGRAM TRACING: CPT | Performed by: SPECIALIST

## 2025-04-07 PROCEDURE — G8399 PT W/DXA RESULTS DOCUMENT: HCPCS | Performed by: SPECIALIST

## 2025-04-07 PROCEDURE — 3017F COLORECTAL CA SCREEN DOC REV: CPT | Performed by: SPECIALIST

## 2025-04-07 PROCEDURE — 3079F DIAST BP 80-89 MM HG: CPT | Performed by: SPECIALIST

## 2025-04-07 PROCEDURE — 1159F MED LIST DOCD IN RCRD: CPT | Performed by: SPECIALIST

## 2025-04-07 PROCEDURE — 99214 OFFICE O/P EST MOD 30 MIN: CPT | Performed by: SPECIALIST

## 2025-04-07 PROCEDURE — G8427 DOCREV CUR MEDS BY ELIG CLIN: HCPCS | Performed by: SPECIALIST

## 2025-04-07 PROCEDURE — 1036F TOBACCO NON-USER: CPT | Performed by: SPECIALIST

## 2025-04-07 PROCEDURE — G8417 CALC BMI ABV UP PARAM F/U: HCPCS | Performed by: SPECIALIST

## 2025-04-07 PROCEDURE — 93010 ELECTROCARDIOGRAM REPORT: CPT | Performed by: SPECIALIST

## 2025-04-07 PROCEDURE — 3077F SYST BP >= 140 MM HG: CPT | Performed by: SPECIALIST

## 2025-04-07 PROCEDURE — 1160F RVW MEDS BY RX/DR IN RCRD: CPT | Performed by: SPECIALIST

## 2025-04-07 PROCEDURE — 1126F AMNT PAIN NOTED NONE PRSNT: CPT | Performed by: SPECIALIST

## 2025-04-07 PROCEDURE — 1090F PRES/ABSN URINE INCON ASSESS: CPT | Performed by: SPECIALIST

## 2025-04-07 ASSESSMENT — PATIENT HEALTH QUESTIONNAIRE - PHQ9
SUM OF ALL RESPONSES TO PHQ QUESTIONS 1-9: 0
1. LITTLE INTEREST OR PLEASURE IN DOING THINGS: NOT AT ALL
SUM OF ALL RESPONSES TO PHQ QUESTIONS 1-9: 0
2. FEELING DOWN, DEPRESSED OR HOPELESS: NOT AT ALL

## 2025-04-07 NOTE — PROGRESS NOTES
[facility]                                     DIVISION OF CARDIOLOGY                                                                             OFFICE NOTE                  KITTY VIRAMONTES M.D. , [place]            FADY PITTS   1955  [id number]    Date/Time:  4/7/202511:32 AM      BP (!) 144/84 (BP Site: Left Upper Arm, Patient Position: Sitting, BP Cuff Size: Large Adult)   Pulse 75   Ht 1.524 m (5')   Wt 112.8 kg (248 lb 9.6 oz)   SpO2 95%   BMI 48.55 kg/m²        Wt Readings from Last 3 Encounters:   04/07/25 112.8 kg (248 lb 9.6 oz)   04/04/25 113.6 kg (250 lb 6.4 oz)   03/17/25 110.7 kg (244 lb)          Lab Results   Component Value Date    CHOL 213 (H) 10/31/2024    TRIG 138 10/31/2024    HDL 62 10/31/2024    VLDL 27.6 10/31/2024    CHOLHDLRATIO 3.4 10/31/2024              SUBJECTIVE:  He is actually doing pretty well she denies any chest pain chest discomfort or similar no shortness of breath therapy and reported.  No palpitations       Assessment/Plan    1.  Cardiomyopathy: We have discussed the results of echocardiogram January thousand 23 with normal ejection fraction of 55 to 60%.    Repeat echocardiogram in March 2025 ejection fraction of 55 to 60% abnormal diastolic function but no gross valvular dysfunctions.    Clinically she seems compensated at this time.    In December 2022 nuclear stress test with no clear evidence for ischemia and an estimated normal ejection fraction at 71%.    For now continue Toprol and Avapro.     No recurrent chest pain the chest discomfort upon palpation was likely costochondritis.     Her EKG today reveals normal sinus rhythm without significant ST-T changes    2.  Hypertension: Her blood pressure seems to be reasonable at this point she will keep an eye on it for now.  She has started Wegovy for weight loss I would expect also some improvement in her blood pressure after weight loss.    Hydralazine the back burner.   
1. Have you been to the ER, urgent care clinic since your last visit?  Hospitalized since your last visit?No    2. Have you seen or consulted any other health care providers outside of the [facility] since your last visit?  Include any pap smears or colon screening. No    
Declines

## 2025-04-17 DIAGNOSIS — M17.10 UNILATERAL PRIMARY OSTEOARTHRITIS, UNSPECIFIED KNEE: ICD-10-CM

## 2025-04-17 RX ORDER — MELOXICAM 15 MG/1
15 TABLET ORAL DAILY PRN
Qty: 30 TABLET | Refills: 2 | Status: SHIPPED | OUTPATIENT
Start: 2025-04-17

## 2025-05-01 ENCOUNTER — OFFICE VISIT (OUTPATIENT)
Age: 70
End: 2025-05-01
Payer: MEDICARE

## 2025-05-01 VITALS
RESPIRATION RATE: 18 BRPM | DIASTOLIC BLOOD PRESSURE: 73 MMHG | WEIGHT: 245.8 LBS | HEART RATE: 58 BPM | OXYGEN SATURATION: 100 % | BODY MASS INDEX: 48.26 KG/M2 | HEIGHT: 60 IN | SYSTOLIC BLOOD PRESSURE: 157 MMHG | TEMPERATURE: 98.4 F

## 2025-05-01 DIAGNOSIS — G47.33 OSA ON CPAP: ICD-10-CM

## 2025-05-01 DIAGNOSIS — M25.60 STIFFNESS IN JOINT: ICD-10-CM

## 2025-05-01 DIAGNOSIS — I10 ESSENTIAL (PRIMARY) HYPERTENSION: ICD-10-CM

## 2025-05-01 DIAGNOSIS — E78.00 HIGH CHOLESTEROL: ICD-10-CM

## 2025-05-01 DIAGNOSIS — M17.0 PRIMARY OSTEOARTHRITIS OF BOTH KNEES: ICD-10-CM

## 2025-05-01 DIAGNOSIS — G47.33 OSA (OBSTRUCTIVE SLEEP APNEA): Primary | ICD-10-CM

## 2025-05-01 LAB
ALBUMIN SERPL-MCNC: 3.8 G/DL (ref 3.5–5)
ALBUMIN/GLOB SERPL: 1.2 (ref 1.1–2.2)
ALP SERPL-CCNC: 101 U/L (ref 45–117)
ALT SERPL-CCNC: 19 U/L (ref 12–78)
ANION GAP SERPL CALC-SCNC: 5 MMOL/L (ref 2–12)
AST SERPL-CCNC: 18 U/L (ref 15–37)
BASOPHILS # BLD: 0.02 K/UL (ref 0–0.1)
BASOPHILS NFR BLD: 0.3 % (ref 0–1)
BILIRUB SERPL-MCNC: 0.6 MG/DL (ref 0.2–1)
BUN SERPL-MCNC: 25 MG/DL (ref 6–20)
BUN/CREAT SERPL: 20 (ref 12–20)
CALCIUM SERPL-MCNC: 9.7 MG/DL (ref 8.5–10.1)
CHLORIDE SERPL-SCNC: 103 MMOL/L (ref 97–108)
CO2 SERPL-SCNC: 30 MMOL/L (ref 21–32)
CREAT SERPL-MCNC: 1.26 MG/DL (ref 0.55–1.02)
DIFFERENTIAL METHOD BLD: ABNORMAL
EOSINOPHIL # BLD: 0.07 K/UL (ref 0–0.4)
EOSINOPHIL NFR BLD: 0.9 % (ref 0–7)
ERYTHROCYTE [DISTWIDTH] IN BLOOD BY AUTOMATED COUNT: 12.5 % (ref 11.5–14.5)
GLOBULIN SER CALC-MCNC: 3.2 G/DL (ref 2–4)
GLUCOSE SERPL-MCNC: 101 MG/DL (ref 65–100)
HCT VFR BLD AUTO: 39.6 % (ref 35–47)
HGB BLD-MCNC: 12.8 G/DL (ref 11.5–16)
IMM GRANULOCYTES # BLD AUTO: 0.03 K/UL (ref 0–0.04)
IMM GRANULOCYTES NFR BLD AUTO: 0.4 % (ref 0–0.5)
LYMPHOCYTES # BLD: 1.88 K/UL (ref 0.8–3.5)
LYMPHOCYTES NFR BLD: 25.1 % (ref 12–49)
MCH RBC QN AUTO: 28.8 PG (ref 26–34)
MCHC RBC AUTO-ENTMCNC: 32.3 G/DL (ref 30–36.5)
MCV RBC AUTO: 89.2 FL (ref 80–99)
MONOCYTES # BLD: 0.34 K/UL (ref 0–1)
MONOCYTES NFR BLD: 4.5 % (ref 5–13)
NEUTS SEG # BLD: 5.16 K/UL (ref 1.8–8)
NEUTS SEG NFR BLD: 68.8 % (ref 32–75)
NRBC # BLD: 0 K/UL (ref 0–0.01)
NRBC BLD-RTO: 0 PER 100 WBC
PLATELET # BLD AUTO: 340 K/UL (ref 150–400)
PMV BLD AUTO: 9.9 FL (ref 8.9–12.9)
POTASSIUM SERPL-SCNC: 4.1 MMOL/L (ref 3.5–5.1)
PROT SERPL-MCNC: 7 G/DL (ref 6.4–8.2)
RBC # BLD AUTO: 4.44 M/UL (ref 3.8–5.2)
SODIUM SERPL-SCNC: 138 MMOL/L (ref 136–145)
WBC # BLD AUTO: 7.5 K/UL (ref 3.6–11)

## 2025-05-01 PROCEDURE — 99214 OFFICE O/P EST MOD 30 MIN: CPT | Performed by: INTERNAL MEDICINE

## 2025-05-01 PROCEDURE — 3017F COLORECTAL CA SCREEN DOC REV: CPT | Performed by: INTERNAL MEDICINE

## 2025-05-01 PROCEDURE — G8427 DOCREV CUR MEDS BY ELIG CLIN: HCPCS | Performed by: INTERNAL MEDICINE

## 2025-05-01 PROCEDURE — 3078F DIAST BP <80 MM HG: CPT | Performed by: INTERNAL MEDICINE

## 2025-05-01 PROCEDURE — 1036F TOBACCO NON-USER: CPT | Performed by: INTERNAL MEDICINE

## 2025-05-01 PROCEDURE — G8417 CALC BMI ABV UP PARAM F/U: HCPCS | Performed by: INTERNAL MEDICINE

## 2025-05-01 PROCEDURE — 3077F SYST BP >= 140 MM HG: CPT | Performed by: INTERNAL MEDICINE

## 2025-05-01 PROCEDURE — G8399 PT W/DXA RESULTS DOCUMENT: HCPCS | Performed by: INTERNAL MEDICINE

## 2025-05-01 PROCEDURE — 1090F PRES/ABSN URINE INCON ASSESS: CPT | Performed by: INTERNAL MEDICINE

## 2025-05-01 PROCEDURE — 1159F MED LIST DOCD IN RCRD: CPT | Performed by: INTERNAL MEDICINE

## 2025-05-01 PROCEDURE — 1123F ACP DISCUSS/DSCN MKR DOCD: CPT | Performed by: INTERNAL MEDICINE

## 2025-05-01 PROCEDURE — 1160F RVW MEDS BY RX/DR IN RCRD: CPT | Performed by: INTERNAL MEDICINE

## 2025-05-01 SDOH — ECONOMIC STABILITY: INCOME INSECURITY: IN THE LAST 12 MONTHS, WAS THERE A TIME WHEN YOU WERE NOT ABLE TO PAY THE MORTGAGE OR RENT ON TIME?: NO

## 2025-05-01 SDOH — ECONOMIC STABILITY: FOOD INSECURITY: WITHIN THE PAST 12 MONTHS, THE FOOD YOU BOUGHT JUST DIDN'T LAST AND YOU DIDN'T HAVE MONEY TO GET MORE.: PATIENT DECLINED

## 2025-05-01 SDOH — ECONOMIC STABILITY: FOOD INSECURITY: WITHIN THE PAST 12 MONTHS, YOU WORRIED THAT YOUR FOOD WOULD RUN OUT BEFORE YOU GOT MONEY TO BUY MORE.: SOMETIMES TRUE

## 2025-05-01 SDOH — ECONOMIC STABILITY: TRANSPORTATION INSECURITY
IN THE PAST 12 MONTHS, HAS THE LACK OF TRANSPORTATION KEPT YOU FROM MEDICAL APPOINTMENTS OR FROM GETTING MEDICATIONS?: NO

## 2025-05-01 NOTE — PROGRESS NOTES
Chief Complaint   Patient presents with    Hypertension    Headache     \"Have you been to the ER, urgent care clinic since your last visit?  Hospitalized since your last visit?\"    NO    “Have you seen or consulted any other health care providers outside of [facility] since your last visit?”    NO       Have you had a mammogram?”   NO    Date of last Mammogram: 11/22/2021

## 2025-05-01 NOTE — PROGRESS NOTES
Vanessa Martinez (:  1955) is a 69 y.o. female, Established patient, here for evaluation of the following chief complaint(s):  Hypertension and Headache         Assessment & Plan  1. Headaches.  - Reported experiencing headaches starting about 2 weeks ago, attributed to dehydration.  - Proactive hydration has resolved the headaches.  - Blood pressure is stable.      2. Cardiomyopathy.  - Mild cardiomyopathy TTE in  EF 45  % - improved done 2025 and now EF is 55-60% has abnormal diastolic  - non ischemic  - Currently on Toprol and Avapro.  - No changes to the current regimen are needed.  - Follows with Dr Kidd    3. Obesity management.  - Started Wegovy for weight loss but concerned about the cost ($1500 per month) stopped.  - Lost 2 pounds since the last visit with Dr. Gonzales.  - Advised to continue monitoring weight and consider alternative weight loss strategies if Wegovy is not affordable.  - Hemoglobin A1c checked by Dr. Gonzales was 5.2 on 10/2024.    4. Tinnitus.  - Experiences intermittent ringing in ears  - Fluid present in the ear, no infection.  - Advised to take Zyrtec for 2 weeks to help drain the fluid.  - Fluid in the ear may be contributing to symptoms.  - she will let me know if no improvement    5. Pain management.  - Currently taking Mobic (meloxicam) for pain, reports effectiveness.  - Visits a pain management center and has experienced relief from stiffness.  - No changes to the current pain management plan are needed.  - Encouraged to remain active and engage in yard work.    6. Health maintenance.  - Last colonoscopy conducted in  by Dr. Ba, next scheduled for .  - Continues annual mammograms, next scheduled for 2025.  - Due for a pneumonia vaccine, not available at this time advised to obtain at local pharm  - Comprehensive blood work will be ordered to monitor overall health.    7. ANURADHA: continue CPAP use   Results  Labs   - Hemoglobin A1c: 5.2    Diagnostic

## 2025-05-04 ENCOUNTER — RESULTS FOLLOW-UP (OUTPATIENT)
Age: 70
End: 2025-05-04

## 2025-05-04 DIAGNOSIS — R79.89 ELEVATED SERUM CREATININE: Primary | ICD-10-CM

## 2025-05-08 ENCOUNTER — TELEPHONE (OUTPATIENT)
Age: 70
End: 2025-05-08

## 2025-05-08 NOTE — TELEPHONE ENCOUNTER
Patient has been sent the link for our pre-recorded [facility] Medical Weight Loss Orientation. Patient is required to register, view the recording, call insurance to verify benefits, then send an email to the  to schedule a consultation.

## 2025-05-21 VITALS — HEIGHT: 60 IN | BODY MASS INDEX: 48.69 KG/M2 | WEIGHT: 248 LBS

## 2025-05-21 ASSESSMENT — SLEEP AND FATIGUE QUESTIONNAIRES
HOW LIKELY ARE YOU TO NOD OFF OR FALL ASLEEP WHILE SITTING AND TALKING TO SOMEONE: WOULD NEVER DOZE
HOW LIKELY ARE YOU TO NOD OFF OR FALL ASLEEP WHILE SITTING AND READING: SLIGHT CHANCE OF DOZING
HOW LIKELY ARE YOU TO NOD OFF OR FALL ASLEEP WHILE SITTING INACTIVE IN A PUBLIC PLACE: WOULD NEVER DOZE
HOW LIKELY ARE YOU TO NOD OFF OR FALL ASLEEP WHILE WATCHING TV: WOULD NEVER DOZE
HOW LIKELY ARE YOU TO NOD OFF OR FALL ASLEEP WHILE LYING DOWN TO REST IN THE AFTERNOON WHEN CIRCUMSTANCES PERMIT: SLIGHT CHANCE OF DOZING
HOW LIKELY ARE YOU TO NOD OFF OR FALL ASLEEP WHILE SITTING QUIETLY AFTER LUNCH WITHOUT ALCOHOL: WOULD NEVER DOZE
HOW LIKELY ARE YOU TO NOD OFF OR FALL ASLEEP WHEN YOU ARE A PASSENGER IN A CAR FOR AN HOUR WITHOUT A BREAK: WOULD NEVER DOZE
HOW LIKELY ARE YOU TO NOD OFF OR FALL ASLEEP IN A CAR, WHILE STOPPED FOR A FEW MINUTES IN TRAFFIC: WOULD NEVER DOZE
ESS TOTAL SCORE: 2

## 2025-05-22 ENCOUNTER — TELEMEDICINE (OUTPATIENT)
Age: 70
End: 2025-05-22
Payer: MEDICARE

## 2025-05-22 ENCOUNTER — CLINICAL DOCUMENTATION (OUTPATIENT)
Age: 70
End: 2025-05-22

## 2025-05-22 DIAGNOSIS — E66.01 MORBID OBESITY WITH BODY MASS INDEX OF 45.0-49.9 IN ADULT (HCC): ICD-10-CM

## 2025-05-22 DIAGNOSIS — G47.33 OBSTRUCTIVE SLEEP APNEA (ADULT) (PEDIATRIC): Primary | ICD-10-CM

## 2025-05-22 DIAGNOSIS — I10 ESSENTIAL HYPERTENSION: ICD-10-CM

## 2025-05-22 PROCEDURE — 99204 OFFICE O/P NEW MOD 45 MIN: CPT | Performed by: INTERNAL MEDICINE

## 2025-05-22 ASSESSMENT — SLEEP AND FATIGUE QUESTIONNAIRES
HOW LIKELY ARE YOU TO NOD OFF OR FALL ASLEEP WHILE SITTING QUIETLY AFTER LUNCH WITHOUT ALCOHOL: WOULD NEVER DOZE
HOW LIKELY ARE YOU TO NOD OFF OR FALL ASLEEP WHILE SITTING INACTIVE IN A PUBLIC PLACE: WOULD NEVER DOZE
HAS YOUR RELATIONSHIP WITH FAMILY, FRIENDS OR WORK COLLEAGUES BEEN AFFECTED BECAUSE YOU ARE SLEEPY OR TIRED: NO
FOSQ SCORE: 20
HOW LONG DO YOU NAP: 30 TO 45 MINUTES
SELECT ANY OF THE FOLLOWING BEHAVIORS OBSERVED WHILE PATIENT ASLEEP: LOUD SNORING
DO YOU HAVE DIFFICULTY OPERATING A MOTOR VEHICLE FOR LONG DISTANCES (GREATER THAN 100 MILES) BECAUSE YOU BECOME SLEEPY: NO
NUMBER OF TIMES YOU WAKE PER NIGHT: 0
HAS YOUR MOOD BEEN AFFECTED BECAUSE YOU ARE SLEEPY OR TIRED: NO
HOW LIKELY ARE YOU TO NOD OFF OR FALL ASLEEP WHILE LYING DOWN TO REST IN THE AFTERNOON WHEN CIRCUMSTANCES PERMIT: SLIGHT CHANCE OF DOZING
HOW LIKELY ARE YOU TO NOD OFF OR FALL ASLEEP WHILE SITTING AND READING: WOULD NEVER DOZE
HOW LIKELY ARE YOU TO NOD OFF OR FALL ASLEEP IN A CAR, WHILE STOPPED FOR A FEW MINUTES IN TRAFFIC: WOULD NEVER DOZE
ARE YOU BOTHERED BY WAKING UP TOO EARLY AND NOT BEING ABLE TO GET BACK TO SLEEP: NO
AVERAGE NUMBER OF SLEEP HOURS: 6
DO YOU HAVE PROBLEMS WITH FREQUENT AWAKENINGS AT NIGHT: NO
DO YOU HAVE DIFFICULTY WATCHING A MOVIE OR VIDEO BECAUSE YOU BECOME SLEEPY OR TIRED: NO
HOW LIKELY ARE YOU TO NOD OFF OR FALL ASLEEP WHILE SITTING INACTIVE IN A PUBLIC PLACE: WOULD NEVER DOZE
DO YOU GET TOO LITTLE SLEEP AT NIGHT: NO
HOW LIKELY ARE YOU TO NOD OFF OR FALL ASLEEP WHILE WATCHING TV: WOULD NEVER DOZE
DO YOU HAVE DIFFICULTY CONCENTRATING ON THE THINGS YOU DO BECAUSE YOU ARE SLEEPY OR TIRED: NO
HOW LIKELY ARE YOU TO NOD OFF OR FALL ASLEEP WHILE LYING DOWN TO REST IN THE AFTERNOON WHEN CIRCUMSTANCES PERMIT: SLIGHT CHANCE OF DOZING
DO YOU GENERALLY HAVE DIFFICULTY REMEMBERING THINGS BECAUSE YOU ARE SLEEPY OR TIRED: NO
ARE YOU BOTHERED BY WAKING UP TOO EARLY AND NOT BEING ABLE TO GET BACK TO SLEEP: NO
HOW LIKELY ARE YOU TO NOD OFF OR FALL ASLEEP WHILE SITTING QUIETLY AFTER LUNCH WITHOUT ALCOHOL: WOULD NEVER DOZE
AVERAGE NUMBER OF SLEEP HOURS: 6
DO YOU HAVE DIFFICULTY OPERATING A MOTOR VEHICLE FOR SHORT DISTANCES (LESS THAN 100 MILES) BECAUSE YOU BECOME SLEEPY: NO
HOW LIKELY ARE YOU TO NOD OFF OR FALL ASLEEP WHILE SITTING AND TALKING TO SOMEONE: WOULD NEVER DOZE
HOW LIKELY ARE YOU TO NOD OFF OR FALL ASLEEP WHILE SITTING AND TALKING TO SOMEONE: WOULD NEVER DOZE
HOW LONG DO YOU NAP: 45 MINUTES TO 1 HOUR
SELECT ANY OF THE FOLLOWING BEHAVIORS OBSERVED WHILE YOU ARE ASLEEP: LOUD SNORING
DO YOU HAVE DIFFICULTY BEING AS ACTIVE AS YOU WANT TO BE IN THE EVENING BECAUSE YOU ARE SLEEPY OR TIRED: NO
HOW LIKELY ARE YOU TO NOD OFF OR FALL ASLEEP WHILE WATCHING TV: WOULD NEVER DOZE
DO YOU WORK SHIFTS: NO
HOW LIKELY ARE YOU TO NOD OFF OR FALL ASLEEP WHEN YOU ARE A PASSENGER IN A CAR FOR AN HOUR WITHOUT A BREAK: SLIGHT CHANCE OF DOZING
DO YOU TAKE NAPS: YES
HOW MANY NAPS DO YOU TAKE PER WEEK: 3
ESS TOTAL SCORE: 2
HOW LIKELY ARE YOU TO NOD OFF OR FALL ASLEEP WHILE SITTING AND READING: WOULD NEVER DOZE
DO YOU HAVE DIFFICULTY VISITING YOUR FAMILY OR FRIENDS IN THEIR HOME BECAUSE YOU BECOME SLEEPY OR TIRED: NO
HOW LIKELY ARE YOU TO NOD OFF OR FALL ASLEEP IN A CAR, WHILE STOPPED FOR A FEW MINUTES IN TRAFFIC: WOULD NEVER DOZE
DO YOU GET TOO LITTLE SLEEP AT NIGHT: NO
DO YOU HAVE DIFFICULTY BEING AS ACTIVE AS YOU WANT TO BE IN THE MORNING BECAUSE YOU ARE SLEEPY OR TIRED: NO
HOW LIKELY ARE YOU TO NOD OFF OR FALL ASLEEP WHEN YOU ARE A PASSENGER IN A CAR FOR AN HOUR WITHOUT A BREAK: SLIGHT CHANCE OF DOZING

## 2025-05-22 NOTE — PROGRESS NOTES
unanticipated grammatical, syntax, homophones, and other interpretive errors are inadvertently transcribed by the computer software.Please excuse any errors that have escaped final proofreading.)

## 2025-05-29 DIAGNOSIS — I10 ESSENTIAL (PRIMARY) HYPERTENSION: ICD-10-CM

## 2025-05-29 RX ORDER — METOPROLOL SUCCINATE 50 MG/1
50 TABLET, EXTENDED RELEASE ORAL NIGHTLY
Qty: 90 TABLET | Refills: 3 | Status: SHIPPED | OUTPATIENT
Start: 2025-05-29

## 2025-05-29 NOTE — TELEPHONE ENCOUNTER
Requested Prescriptions     Signed Prescriptions Disp Refills    metoprolol succinate (TOPROL XL) 50 MG extended release tablet 90 tablet 3     Sig: TAKE 1 TABLET BY MOUTH EVERY DAY AT NIGHT     Authorizing Provider: SHILO KIDD     Ordering User: MARIO MARQUIS     Verbal order per Dr. Kidd.     Future Appointments   Date Time Provider Department Center   9/2/2025  2:10 PM Rachel Suh, APRN - NP [place] BS [facility]   11/5/2025  9:00 AM Deloris Claire MD [facility] DEP   1/16/2026 10:40 AM Shilo Kidd MD [facility] BS AMB

## 2025-06-15 DIAGNOSIS — I10 ESSENTIAL (PRIMARY) HYPERTENSION: ICD-10-CM

## 2025-06-16 RX ORDER — CHLORTHALIDONE 25 MG/1
25 TABLET ORAL DAILY
Qty: 90 TABLET | Refills: 1 | Status: SHIPPED | OUTPATIENT
Start: 2025-06-16

## 2025-06-16 NOTE — TELEPHONE ENCOUNTER
Cardiologist: Dr. Kidd    Future Appointments   Date Time Provider Department Center   9/2/2025  2:10 PM Rachel Suh APRN - [facility]   11/5/2025  9:00 AM Deloris Claire MD [facility]   1/16/2026 10:40 AM Shilo Kidd MD [facility]       Requested Prescriptions     Signed Prescriptions Disp Refills    chlorthalidone (HYGROTON) 25 MG tablet 90 tablet 1     Sig: Take 1 tablet by mouth daily     Authorizing Provider: SHILO KIDD     Ordering User: GEREMIAS BOSS         Refills VO per Dr. Kidd.

## 2025-07-27 DIAGNOSIS — M17.10 UNILATERAL PRIMARY OSTEOARTHRITIS, UNSPECIFIED KNEE: ICD-10-CM

## 2025-07-28 RX ORDER — MELOXICAM 15 MG/1
15 TABLET ORAL DAILY PRN
Qty: 30 TABLET | Refills: 2 | Status: SHIPPED | OUTPATIENT
Start: 2025-07-28

## 2025-08-24 DIAGNOSIS — I10 ESSENTIAL (PRIMARY) HYPERTENSION: ICD-10-CM

## 2025-08-25 RX ORDER — IRBESARTAN 300 MG/1
300 TABLET ORAL DAILY
Qty: 90 TABLET | Refills: 1 | Status: SHIPPED | OUTPATIENT
Start: 2025-08-25

## 2025-09-02 ENCOUNTER — TELEMEDICINE (OUTPATIENT)
Age: 70
End: 2025-09-02
Payer: MEDICARE

## 2025-09-02 ENCOUNTER — PATIENT MESSAGE (OUTPATIENT)
Age: 70
End: 2025-09-02

## 2025-09-02 VITALS — HEIGHT: 60 IN | BODY MASS INDEX: 48.69 KG/M2 | WEIGHT: 248 LBS

## 2025-09-02 DIAGNOSIS — I10 ESSENTIAL HYPERTENSION: ICD-10-CM

## 2025-09-02 DIAGNOSIS — G47.33 OBSTRUCTIVE SLEEP APNEA (ADULT) (PEDIATRIC): Primary | ICD-10-CM

## 2025-09-02 PROCEDURE — 99214 OFFICE O/P EST MOD 30 MIN: CPT | Performed by: NURSE PRACTITIONER

## 2025-09-02 ASSESSMENT — SLEEP AND FATIGUE QUESTIONNAIRES
HAS YOUR RELATIONSHIP WITH FAMILY, FRIENDS OR WORK COLLEAGUES BEEN AFFECTED BECAUSE YOU ARE SLEEPY OR TIRED: NO
HOW LIKELY ARE YOU TO NOD OFF OR FALL ASLEEP IN A CAR, WHILE STOPPED FOR A FEW MINUTES IN TRAFFIC: WOULD NEVER DOZE
DO YOU HAVE DIFFICULTY OPERATING A MOTOR VEHICLE FOR SHORT DISTANCES (LESS THAN 100 MILES) BECAUSE YOU BECOME SLEEPY: NO
HOW LIKELY ARE YOU TO NOD OFF OR FALL ASLEEP WHEN YOU ARE A PASSENGER IN A CAR FOR AN HOUR WITHOUT A BREAK: WOULD NEVER DOZE
HOW LIKELY ARE YOU TO NOD OFF OR FALL ASLEEP WHILE LYING DOWN TO REST IN THE AFTERNOON WHEN CIRCUMSTANCES PERMIT: SLIGHT CHANCE OF DOZING
HOW LIKELY ARE YOU TO NOD OFF OR FALL ASLEEP WHILE WATCHING TV: SLIGHT CHANCE OF DOZING
HOW LIKELY ARE YOU TO NOD OFF OR FALL ASLEEP WHILE SITTING AND READING: WOULD NEVER DOZE
DO YOU HAVE DIFFICULTY WATCHING A MOVIE OR VIDEO BECAUSE YOU BECOME SLEEPY OR TIRED: NO
HOW LIKELY ARE YOU TO NOD OFF OR FALL ASLEEP WHILE SITTING AND TALKING TO SOMEONE: WOULD NEVER DOZE
DO YOU HAVE DIFFICULTY BEING AS ACTIVE AS YOU WANT TO BE IN THE MORNING BECAUSE YOU ARE SLEEPY OR TIRED: NO
DO YOU HAVE DIFFICULTY VISITING YOUR FAMILY OR FRIENDS IN THEIR HOME BECAUSE YOU BECOME SLEEPY OR TIRED: NO
DO YOU HAVE DIFFICULTY BEING AS ACTIVE AS YOU WANT TO BE IN THE EVENING BECAUSE YOU ARE SLEEPY OR TIRED: NO
HOW LIKELY ARE YOU TO NOD OFF OR FALL ASLEEP WHILE WATCHING TV: SLIGHT CHANCE OF DOZING
HOW LIKELY ARE YOU TO NOD OFF OR FALL ASLEEP WHILE SITTING QUIETLY AFTER LUNCH WITHOUT ALCOHOL: WOULD NEVER DOZE
HOW LIKELY ARE YOU TO NOD OFF OR FALL ASLEEP WHILE LYING DOWN TO REST IN THE AFTERNOON WHEN CIRCUMSTANCES PERMIT: SLIGHT CHANCE OF DOZING
ESS TOTAL SCORE: 2
DO YOU HAVE DIFFICULTY CONCENTRATING ON THE THINGS YOU DO BECAUSE YOU ARE SLEEPY OR TIRED: NO
HOW LIKELY ARE YOU TO NOD OFF OR FALL ASLEEP WHILE SITTING QUIETLY AFTER LUNCH WITHOUT ALCOHOL: WOULD NEVER DOZE
HAS YOUR MOOD BEEN AFFECTED BECAUSE YOU ARE SLEEPY OR TIRED: NO
HOW LIKELY ARE YOU TO NOD OFF OR FALL ASLEEP IN A CAR, WHILE STOPPED FOR A FEW MINUTES IN TRAFFIC: WOULD NEVER DOZE
HOW LIKELY ARE YOU TO NOD OFF OR FALL ASLEEP WHEN YOU ARE A PASSENGER IN A CAR FOR AN HOUR WITHOUT A BREAK: WOULD NEVER DOZE
FOSQ SCORE: 20
HOW LIKELY ARE YOU TO NOD OFF OR FALL ASLEEP WHILE SITTING AND TALKING TO SOMEONE: WOULD NEVER DOZE
HOW LIKELY ARE YOU TO NOD OFF OR FALL ASLEEP WHILE SITTING INACTIVE IN A PUBLIC PLACE: WOULD NEVER DOZE
HOW LIKELY ARE YOU TO NOD OFF OR FALL ASLEEP WHILE SITTING AND READING: WOULD NEVER DOZE
HOW LIKELY ARE YOU TO NOD OFF OR FALL ASLEEP WHILE SITTING INACTIVE IN A PUBLIC PLACE: WOULD NEVER DOZE
DO YOU GENERALLY HAVE DIFFICULTY REMEMBERING THINGS BECAUSE YOU ARE SLEEPY OR TIRED: NO
DO YOU HAVE DIFFICULTY OPERATING A MOTOR VEHICLE FOR LONG DISTANCES (GREATER THAN 100 MILES) BECAUSE YOU BECOME SLEEPY: NO

## 2025-09-03 ENCOUNTER — CLINICAL DOCUMENTATION (OUTPATIENT)
Age: 70
End: 2025-09-03